# Patient Record
Sex: MALE | Race: WHITE | Employment: OTHER | URBAN - NONMETROPOLITAN AREA
[De-identification: names, ages, dates, MRNs, and addresses within clinical notes are randomized per-mention and may not be internally consistent; named-entity substitution may affect disease eponyms.]

---

## 2017-02-15 ENCOUNTER — HOSPITAL ENCOUNTER (OUTPATIENT)
Dept: NEUROLOGY | Age: 72
Discharge: HOME | End: 2017-02-15
Payer: MEDICARE

## 2017-02-15 PROCEDURE — 95910 NRV CNDJ TEST 7-8 STUDIES: CPT

## 2017-02-15 PROCEDURE — 95910 NRV CNDJ TEST 7-8 STUDIES: CPT | Performed by: PSYCHIATRY & NEUROLOGY

## 2017-02-15 PROCEDURE — 95886 MUSC TEST DONE W/N TEST COMP: CPT | Performed by: PSYCHIATRY & NEUROLOGY

## 2017-02-15 PROCEDURE — 95886 MUSC TEST DONE W/N TEST COMP: CPT

## 2017-02-27 ENCOUNTER — ANESTHESIA EVENT (OUTPATIENT)
Dept: OPERATING ROOM | Age: 72
End: 2017-02-27

## 2017-03-14 RX ORDER — LOSARTAN POTASSIUM 50 MG/1
12.5 TABLET ORAL DAILY
COMMUNITY
End: 2021-03-22 | Stop reason: DRUGHIGH

## 2017-03-14 RX ORDER — ATORVASTATIN CALCIUM 20 MG/1
20 TABLET, FILM COATED ORAL DAILY
COMMUNITY
End: 2022-10-13

## 2017-03-14 RX ORDER — GLYBURIDE 5 MG/1
2.5 TABLET ORAL 2 TIMES DAILY WITH MEALS
COMMUNITY
End: 2022-10-13

## 2017-03-15 ENCOUNTER — HOSPITAL ENCOUNTER (OUTPATIENT)
Dept: PREADMISSION TESTING | Age: 72
Setting detail: OUTPATIENT SURGERY
Discharge: HOME | End: 2017-03-15

## 2017-03-15 ENCOUNTER — HOSPITAL ENCOUNTER (OUTPATIENT)
Dept: LAB | Age: 72
Discharge: HOME | End: 2017-03-15
Payer: MEDICARE

## 2017-03-15 ENCOUNTER — HOSPITAL ENCOUNTER (OUTPATIENT)
Dept: NON INVASIVE DIAGNOSTICS | Age: 72
Discharge: HOME | End: 2017-03-15
Payer: MEDICARE

## 2017-03-15 VITALS — HEIGHT: 66 IN

## 2017-03-15 DIAGNOSIS — Z01.818 PRE-OP TESTING: ICD-10-CM

## 2017-03-15 DIAGNOSIS — Z01.818 PRE-OP TESTING: Primary | ICD-10-CM

## 2017-03-15 LAB
ANION GAP SERPL CALCULATED.3IONS-SCNC: 12 MMOL/L (ref 7–19)
BUN BLDV-MCNC: 16 MG/DL (ref 8–23)
CALCIUM SERPL-MCNC: 9.5 MG/DL (ref 8.8–10.2)
CHLORIDE BLD-SCNC: 99 MMOL/L (ref 98–111)
CO2: 27 MMOL/L (ref 22–29)
CREAT SERPL-MCNC: 0.8 MG/DL (ref 0.5–1.2)
GFR NON-AFRICAN AMERICAN: >60
GLUCOSE BLD-MCNC: 142 MG/DL (ref 74–109)
POTASSIUM SERPL-SCNC: 4.2 MMOL/L (ref 3.5–5)
SODIUM BLD-SCNC: 138 MMOL/L (ref 136–145)

## 2017-03-15 PROCEDURE — 93005 ELECTROCARDIOGRAM TRACING: CPT

## 2017-03-15 RX ORDER — ASPIRIN/CALCIUM/MAG/ALUMINUM 325 MG
1 TABLET ORAL ONCE
COMMUNITY

## 2017-03-15 RX ORDER — SODIUM CHLORIDE, SODIUM LACTATE, POTASSIUM CHLORIDE, CALCIUM CHLORIDE 600; 310; 30; 20 MG/100ML; MG/100ML; MG/100ML; MG/100ML
INJECTION, SOLUTION INTRAVENOUS CONTINUOUS
Status: CANCELLED | OUTPATIENT
Start: 2017-03-15

## 2017-03-16 ENCOUNTER — HOSPITAL ENCOUNTER (OUTPATIENT)
Age: 72
Setting detail: OUTPATIENT SURGERY
Discharge: HOME | End: 2017-03-16
Attending: ORTHOPAEDIC SURGERY | Admitting: ORTHOPAEDIC SURGERY

## 2017-03-16 ENCOUNTER — ANESTHESIA (OUTPATIENT)
Dept: OPERATING ROOM | Age: 72
End: 2017-03-16
Payer: MEDICARE

## 2017-03-16 VITALS
SYSTOLIC BLOOD PRESSURE: 151 MMHG | RESPIRATION RATE: 18 BRPM | OXYGEN SATURATION: 97 % | TEMPERATURE: 98.6 F | HEART RATE: 86 BPM | DIASTOLIC BLOOD PRESSURE: 97 MMHG

## 2017-03-16 VITALS
DIASTOLIC BLOOD PRESSURE: 80 MMHG | SYSTOLIC BLOOD PRESSURE: 125 MMHG | RESPIRATION RATE: 15 BRPM | OXYGEN SATURATION: 100 %

## 2017-03-16 PROCEDURE — G8907 PT DOC NO EVENTS ON DISCHARG: HCPCS

## 2017-03-16 PROCEDURE — 23430 REPAIR BICEPS TENDON: CPT

## 2017-03-16 PROCEDURE — 29824 SHO ARTHRS SRG DSTL CLAVICLC: CPT

## 2017-03-16 PROCEDURE — 2720000002 HC MISC SURG SUPPLY STERILE >$500: Performed by: ORTHOPAEDIC SURGERY

## 2017-03-16 PROCEDURE — C1713 ANCHOR/SCREW BN/BN,TIS/BN: HCPCS | Performed by: ORTHOPAEDIC SURGERY

## 2017-03-16 PROCEDURE — 23410 REPAIR ROTATOR CUFF ACUTE: CPT

## 2017-03-16 PROCEDURE — 29820 SHO ARTHRS SRG PRTL SYNVCT: CPT

## 2017-03-16 PROCEDURE — 01630 ANES OPN/ARTHR PX SHO JT NOS: CPT | Performed by: NURSE ANESTHETIST, CERTIFIED REGISTERED

## 2017-03-16 PROCEDURE — G8916 PT W IV AB GIVEN ON TIME: HCPCS

## 2017-03-16 PROCEDURE — 29822 SHO ARTHRS SRG LMTD DBRDMT: CPT

## 2017-03-16 DEVICE — ANCHOR SUT DIA2.9MM SUT SZ 2 MAXBRAID BLU POLY DBL LD SGL: Type: IMPLANTABLE DEVICE | Status: FUNCTIONAL

## 2017-03-16 DEVICE — IMPL TOGGLE LOC W/ ZIP LP KT: Type: IMPLANTABLE DEVICE | Status: FUNCTIONAL

## 2017-03-16 DEVICE — ANCHOR SUT DIA5.5MM W/ NO2 FORC FBR KNOTLESS EYELET FOR ROT: Type: IMPLANTABLE DEVICE | Status: FUNCTIONAL

## 2017-03-16 RX ORDER — PROPOFOL 10 MG/ML
INJECTION, EMULSION INTRAVENOUS PRN
Status: DISCONTINUED | OUTPATIENT
Start: 2017-03-16 | End: 2017-03-16 | Stop reason: SDUPTHER

## 2017-03-16 RX ORDER — LABETALOL HYDROCHLORIDE 5 MG/ML
5 INJECTION, SOLUTION INTRAVENOUS EVERY 10 MIN PRN
Status: DISCONTINUED | OUTPATIENT
Start: 2017-03-16 | End: 2017-03-16 | Stop reason: HOSPADM

## 2017-03-16 RX ORDER — ONDANSETRON 2 MG/ML
4 INJECTION INTRAMUSCULAR; INTRAVENOUS
Status: DISCONTINUED | OUTPATIENT
Start: 2017-03-16 | End: 2017-03-16 | Stop reason: HOSPADM

## 2017-03-16 RX ORDER — DIPHENHYDRAMINE HYDROCHLORIDE 50 MG/ML
12.5 INJECTION INTRAMUSCULAR; INTRAVENOUS
Status: DISCONTINUED | OUTPATIENT
Start: 2017-03-16 | End: 2017-03-16 | Stop reason: HOSPADM

## 2017-03-16 RX ORDER — MIDAZOLAM HYDROCHLORIDE 1 MG/ML
INJECTION INTRAMUSCULAR; INTRAVENOUS PRN
Status: DISCONTINUED | OUTPATIENT
Start: 2017-03-16 | End: 2017-03-16 | Stop reason: SDUPTHER

## 2017-03-16 RX ORDER — MEPERIDINE HYDROCHLORIDE 25 MG/ML
12.5 INJECTION INTRAMUSCULAR; INTRAVENOUS; SUBCUTANEOUS EVERY 5 MIN PRN
Status: DISCONTINUED | OUTPATIENT
Start: 2017-03-16 | End: 2017-03-16 | Stop reason: HOSPADM

## 2017-03-16 RX ORDER — OXYCODONE HYDROCHLORIDE AND ACETAMINOPHEN 5; 325 MG/1; MG/1
2 TABLET ORAL PRN
Status: DISCONTINUED | OUTPATIENT
Start: 2017-03-16 | End: 2017-03-16 | Stop reason: HOSPADM

## 2017-03-16 RX ORDER — OXYCODONE HYDROCHLORIDE AND ACETAMINOPHEN 5; 325 MG/1; MG/1
1 TABLET ORAL PRN
Status: DISCONTINUED | OUTPATIENT
Start: 2017-03-16 | End: 2017-03-16 | Stop reason: HOSPADM

## 2017-03-16 RX ORDER — HYDROMORPHONE HCL 110MG/55ML
0.25 PATIENT CONTROLLED ANALGESIA SYRINGE INTRAVENOUS EVERY 5 MIN PRN
Status: DISCONTINUED | OUTPATIENT
Start: 2017-03-16 | End: 2017-03-16 | Stop reason: HOSPADM

## 2017-03-16 RX ORDER — EPHEDRINE SULFATE 50 MG/ML
INJECTION, SOLUTION INTRAVENOUS PRN
Status: DISCONTINUED | OUTPATIENT
Start: 2017-03-16 | End: 2017-03-16 | Stop reason: SDUPTHER

## 2017-03-16 RX ORDER — HYDRALAZINE HYDROCHLORIDE 20 MG/ML
5 INJECTION INTRAMUSCULAR; INTRAVENOUS EVERY 10 MIN PRN
Status: DISCONTINUED | OUTPATIENT
Start: 2017-03-16 | End: 2017-03-16 | Stop reason: HOSPADM

## 2017-03-16 RX ORDER — HYDROCODONE BITARTRATE AND ACETAMINOPHEN 5; 325 MG/1; MG/1
2 TABLET ORAL PRN
Status: DISCONTINUED | OUTPATIENT
Start: 2017-03-16 | End: 2017-03-16 | Stop reason: HOSPADM

## 2017-03-16 RX ORDER — HYDROCODONE BITARTRATE AND ACETAMINOPHEN 10; 325 MG/1; MG/1
1 TABLET ORAL EVERY 4 HOURS PRN
Qty: 90 TABLET | Refills: 0 | Status: SHIPPED | OUTPATIENT
Start: 2017-03-16 | End: 2017-03-23

## 2017-03-16 RX ORDER — CEFAZOLIN SODIUM 1 G/3ML
INJECTION, POWDER, FOR SOLUTION INTRAMUSCULAR; INTRAVENOUS PRN
Status: DISCONTINUED | OUTPATIENT
Start: 2017-03-16 | End: 2017-03-16 | Stop reason: SDUPTHER

## 2017-03-16 RX ORDER — HYDROCODONE BITARTRATE AND ACETAMINOPHEN 5; 325 MG/1; MG/1
1 TABLET ORAL PRN
Status: DISCONTINUED | OUTPATIENT
Start: 2017-03-16 | End: 2017-03-16 | Stop reason: HOSPADM

## 2017-03-16 RX ORDER — SODIUM CHLORIDE, SODIUM LACTATE, POTASSIUM CHLORIDE, CALCIUM CHLORIDE 600; 310; 30; 20 MG/100ML; MG/100ML; MG/100ML; MG/100ML
INJECTION, SOLUTION INTRAVENOUS CONTINUOUS
Status: DISCONTINUED | OUTPATIENT
Start: 2017-03-16 | End: 2017-03-16

## 2017-03-16 RX ORDER — MORPHINE SULFATE 15 MG/1
15 TABLET, FILM COATED, EXTENDED RELEASE ORAL 2 TIMES DAILY
Qty: 60 TABLET | Refills: 0 | Status: SHIPPED | OUTPATIENT
Start: 2017-03-16 | End: 2017-04-15

## 2017-03-16 RX ORDER — ONDANSETRON 2 MG/ML
INJECTION INTRAMUSCULAR; INTRAVENOUS PRN
Status: DISCONTINUED | OUTPATIENT
Start: 2017-03-16 | End: 2017-03-16 | Stop reason: SDUPTHER

## 2017-03-16 RX ORDER — FENTANYL CITRATE 50 UG/ML
INJECTION, SOLUTION INTRAMUSCULAR; INTRAVENOUS PRN
Status: DISCONTINUED | OUTPATIENT
Start: 2017-03-16 | End: 2017-03-16 | Stop reason: SDUPTHER

## 2017-03-16 RX ORDER — SODIUM CHLORIDE, SODIUM LACTATE, POTASSIUM CHLORIDE, CALCIUM CHLORIDE 600; 310; 30; 20 MG/100ML; MG/100ML; MG/100ML; MG/100ML
INJECTION, SOLUTION INTRAVENOUS CONTINUOUS
Status: DISCONTINUED | OUTPATIENT
Start: 2017-03-16 | End: 2017-03-16 | Stop reason: HOSPADM

## 2017-03-16 RX ORDER — LIDOCAINE HYDROCHLORIDE 10 MG/ML
1 INJECTION, SOLUTION EPIDURAL; INFILTRATION; INTRACAUDAL; PERINEURAL
Status: DISCONTINUED | OUTPATIENT
Start: 2017-03-16 | End: 2017-03-16 | Stop reason: HOSPADM

## 2017-03-16 RX ORDER — HYDROMORPHONE HCL 110MG/55ML
0.5 PATIENT CONTROLLED ANALGESIA SYRINGE INTRAVENOUS EVERY 5 MIN PRN
Status: DISCONTINUED | OUTPATIENT
Start: 2017-03-16 | End: 2017-03-16 | Stop reason: HOSPADM

## 2017-03-16 RX ORDER — BUPIVACAINE HYDROCHLORIDE AND EPINEPHRINE 5; 5 MG/ML; UG/ML
INJECTION, SOLUTION PERINEURAL PRN
Status: DISCONTINUED | OUTPATIENT
Start: 2017-03-16 | End: 2017-03-16 | Stop reason: SDUPTHER

## 2017-03-16 RX ORDER — FENTANYL CITRATE 50 UG/ML
50 INJECTION, SOLUTION INTRAMUSCULAR; INTRAVENOUS EVERY 5 MIN PRN
Status: DISCONTINUED | OUTPATIENT
Start: 2017-03-16 | End: 2017-03-16 | Stop reason: HOSPADM

## 2017-03-16 RX ORDER — PROMETHAZINE HYDROCHLORIDE 25 MG/ML
12.5 INJECTION, SOLUTION INTRAMUSCULAR; INTRAVENOUS
Status: DISCONTINUED | OUTPATIENT
Start: 2017-03-16 | End: 2017-03-16 | Stop reason: HOSPADM

## 2017-03-16 RX ORDER — LIDOCAINE HYDROCHLORIDE 20 MG/ML
INJECTION, SOLUTION EPIDURAL; INFILTRATION; INTRACAUDAL; PERINEURAL PRN
Status: DISCONTINUED | OUTPATIENT
Start: 2017-03-16 | End: 2017-03-16 | Stop reason: SDUPTHER

## 2017-03-16 RX ADMIN — PROPOFOL 150 MG: 10 INJECTION, EMULSION INTRAVENOUS at 08:08

## 2017-03-16 RX ADMIN — MIDAZOLAM HYDROCHLORIDE 2 MG: 1 INJECTION INTRAMUSCULAR; INTRAVENOUS at 07:14

## 2017-03-16 RX ADMIN — LIDOCAINE HYDROCHLORIDE 50 MG: 20 INJECTION, SOLUTION EPIDURAL; INFILTRATION; INTRACAUDAL; PERINEURAL at 08:07

## 2017-03-16 RX ADMIN — EPHEDRINE SULFATE 10 MG: 50 INJECTION, SOLUTION INTRAVENOUS at 08:32

## 2017-03-16 RX ADMIN — SODIUM CHLORIDE, SODIUM LACTATE, POTASSIUM CHLORIDE, CALCIUM CHLORIDE: 600; 310; 30; 20 INJECTION, SOLUTION INTRAVENOUS at 07:59

## 2017-03-16 RX ADMIN — BUPIVACAINE HYDROCHLORIDE AND EPINEPHRINE 30 ML: 5; 5 INJECTION, SOLUTION PERINEURAL at 07:28

## 2017-03-16 RX ADMIN — CEFAZOLIN SODIUM 1000 MG: 1 INJECTION, POWDER, FOR SOLUTION INTRAMUSCULAR; INTRAVENOUS at 07:59

## 2017-03-16 RX ADMIN — SODIUM CHLORIDE, SODIUM LACTATE, POTASSIUM CHLORIDE, CALCIUM CHLORIDE: 600; 310; 30; 20 INJECTION, SOLUTION INTRAVENOUS at 06:50

## 2017-03-16 RX ADMIN — ONDANSETRON 4 MG: 2 INJECTION INTRAMUSCULAR; INTRAVENOUS at 09:16

## 2017-03-16 RX ADMIN — FENTANYL CITRATE 50 MCG: 50 INJECTION, SOLUTION INTRAMUSCULAR; INTRAVENOUS at 08:05

## 2017-04-19 ENCOUNTER — TRANSCRIBE ORDERS (OUTPATIENT)
Dept: PHYSICAL THERAPY | Facility: HOSPITAL | Age: 72
End: 2017-04-19

## 2017-04-19 ENCOUNTER — HOSPITAL ENCOUNTER (OUTPATIENT)
Dept: PHYSICAL THERAPY | Facility: HOSPITAL | Age: 72
Setting detail: THERAPIES SERIES
Discharge: HOME | End: 2017-04-19

## 2017-04-19 DIAGNOSIS — M25.512 ACUTE PAIN OF LEFT SHOULDER: ICD-10-CM

## 2017-04-19 DIAGNOSIS — M25.512 LEFT SHOULDER PAIN, UNSPECIFIED CHRONICITY: Primary | ICD-10-CM

## 2017-04-19 DIAGNOSIS — Z98.890 S/P ROTATOR CUFF REPAIR: Primary | ICD-10-CM

## 2017-04-19 PROCEDURE — G8984 CARRY CURRENT STATUS: HCPCS

## 2017-04-19 PROCEDURE — G8985 CARRY GOAL STATUS: HCPCS

## 2017-04-19 PROCEDURE — 97161 PT EVAL LOW COMPLEX 20 MIN: CPT

## 2017-04-19 NOTE — PROGRESS NOTES
Outpatient Physical Therapy Ortho Initial Evaluation  [facility]     Patient Name: Michael Novak  : 1945  MRN: [id number]  Today's Date: 2017      Visit Date: 2017    There is no problem list on file for this patient.       Past Medical History:   Diagnosis Date   • Back pain    • Coronary artery disease    • Diabetes mellitus    • Hearing deficit    • Hypertension         Past Surgical History:   Procedure Laterality Date   • ROTATOR CUFF REPAIR Left 03/15/2017       Visit Dx:     ICD-10-CM ICD-9-CM   1. S/P rotator cuff repair Z98.890 V45.89   2. Acute pain of left shoulder M25.512 719.41             Patient History       17 1400          History    Chief Complaint Pain;Muscle weakness  -RS      Type of Pain Shoulder pain  -RS      Date Current Problem(s) Began 17  -RS      Brief Description of Current Complaint Patient reports a 40 year history of shoulder pain on each side.  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  The pain was so bad that he elected to have a left rotator cuff repair (tendon unknown) one month ago.  He is not using a sling and has been on restrictions lifting >1#.  Pt has been performing pendulum exercises.  -RS      Previous treatment for THIS PROBLEM Injections;Medication  -RS      Onset Date- PT 2017  -RS      Patient/Caregiver Goals Relieve pain;Improve mobility;Improve strength  -RS      Current Tobacco Use no  -RS      Smoking Status never smoker  -RS      Patient's Rating of General Health Very good  -RS      Hand Dominance right-handed  -RS      Occupation/sports/leisure activities   -RS      Pain     Pain Location Shoulder   left  -RS      Pain at Present 1  -RS      Pain at Best 0  -RS      Pain at Worst 6  -RS      Pain Frequency Intermittent  -RS      Pain Description Aching;Sharp;Sore  -RS      Pain Comments Localized to the left shoulder.  No pain distal with no N/T into the  hand  -RS      Fall Risk Assessment    Any falls in the past year: No  -RS      Daily Activities    Primary Language English  -RS      Does patient have problems with the following? None  -RS      Pt Participated in POC and Goals Yes  -RS      Safety    Are you being hurt, hit, or frightened by anyone at home or in your life? No  -RS      Are you being neglected by a caregiver No  -RS        User Key  (r) = Recorded By, (t) = Taken By, (c) = Cosigned By    Initials Name Provider Type    RS Mac Collins PT DPT Physical Therapist                PT Ortho       04/19/17 1500    Posture/Observations    Alignment Options Thoracic kyphosis;Rounded shoulders  -RS    Thoracic Kyphosis Moderate;Increased;Sitting posture  -RS    Rounded Shoulders Mild;Increased;Sitting posture  -RS    Posture/Observations Comments Scapular alignment is WNL in terms of abduction and relationship to midline.  Increased scapular IR and anterior tilting.  -RS    Sensation    Sensation WNL? WNL  -RS    Light Touch No apparent deficits  -RS    Special Tests/Palpation    Special Tests/Palpation Shoulder  -RS    Shoulder Girdle Palpation    Deltoid Left:;Atrophied  -RS    Infraspinatus Left:;Atrophied  -RS    Teres Minor Left:;Atrophied  -RS    Upper Trap Left:;Tender;Guarded/taut  -RS    Middle Trap Left:;Atrophied  -RS    Lower Trap Left:;Atrophied  -RS    Shoulder Girdle Palpation? Yes  -RS    Shoulder Girdle Accessory Motions    Shoulder Girdle Accessory Motions Tested? Yes  -RS    Posterior glide of humerus Left:;Hypomobile  -RS    Inferior glide of humerus Left:;Hypomobile  -RS    Superior mobility of scapula Left:;Hypomobile  -RS    ROM (Range of Motion)    General ROM upper extremity range of motion deficits identified  -RS    Left Shoulder    Flexion PROM other (see comments)   115  -RS    ABduction PROM other (see comments)   105 plane of scapula  -RS    External Rotation PROM other (see comments)   31 with arm by the side  -RS     Internal Rotation PROM other (see comments)   45  -RS    ROM Impairment Detail Cross body adduction limited to the left eye with the scapula stabilized  -RS    General UE Assessment    ROM shoulder, left: UE ROM deficit  -RS    MMT (Manual Muscle Testing)    General MMT Assessment Detail unable to grade due to post operative restrictions  -RS    Pathomechanics    Upper Extremity Pathomechanics Limited scapular upward rotation;Limited thoracic extension;Limited glenohumeral internal rotation  -RS      User Key  (r) = Recorded By, (t) = Taken By, (c) = Cosigned By    Initials Name Provider Type    RS Mac Collins, PT DPT Physical Therapist                            Therapy Education       04/19/17 1500          Therapy Education    Given Symptoms/condition management  -RS      Program Modified   cradle the baby pendulums  -RS      How Provided Verbal  -RS      Provided to Patient  -RS      Level of Understanding Verbalized  -RS        User Key  (r) = Recorded By, (t) = Taken By, (c) = Cosigned By    Initials Name Provider Type    RS Mac Collins, PT DPT Physical Therapist                PT OP Goals       04/19/17 1500       PT Short Term Goals    STG Date to Achieve 05/10/17  -RS     STG 1 Patient will improve cross body adduction to the left eye with the scapula stabilized  -RS     STG 1 Progress New  -RS     STG 2 Patient will be knowledgeable of restrictions for ROM   -RS     STG 2 Progress New  -RS     STG 3 Patient will demonstrate full (L) shoulder PROM in all planes with no significant pain at end range.  -RS     STG 3 Progress New  -RS     Long Term Goals    LTG Date to Achieve 05/31/17  -RS     LTG 1 Patient will be independent with a comprehensive HEP  -RS     LTG 1 Progress New  -RS     LTG 2 Patient will demonstrate AROM within 10 degrees of the right shoulder in all planes, demonstrating no superior glide during first half of the range.  -RS     LTG 2 Progress New  -RS     LTG 3 Patient  will demonstrate left rotator cuff strength to at least 4+/5 on MMT by discharge  -RS     LTG 3 Progress New  -RS     LTG 4 Patient will score <10 on the Quick DASH indicating significant improvement in overall function of the left shoulder with ADL's by discharge.  -RS     LTG 4 Progress New  -RS     LTG 5 Patient will report symptoms <2/10 with ADL's and leisure activities by discharge.  -RS     LTG 5 Progress New  -RS     Time Calculation    PT Goal Re-Cert Due Date 05/19/17  -RS       User Key  (r) = Recorded By, (t) = Taken By, (c) = Cosigned By    Initials Name Provider Type    RS Mac Collins, PT DPT Physical Therapist                PT Assessment/Plan       04/19/17 1500       PT Assessment    Functional Limitations Performance in work activities;Performance in leisure activities;Performance in self-care ADL  -RS     Impairments Joint mobility;Muscle strength;Pain;Posture;Range of motion  -RS     Assessment Comments Patient presents today s/p (L) rotator cuff repair one month prior.  The exact nature of the surgery is not known as the patient voices that the repair involved more than one tendon.  This is a chronic cuff injury that will need to be loaded slowly over time.  The quality of PROM today was generally good with only mild joint limitations (also likely chronic).  Patient has been compliant with the post operative MD restrictions and was educated on additional precautions today (reaching behind the back, extension with IR, etc).  There is noted atrophy of the supraspinatus and infraspinatus/teres minor with assessment today.  After the PROM is restored we will begin to load the tendon in conjunction with normal tissue healing timelines and patient response.  The patient remains motivated to improve and understands that this will be a process.  Thank you for allowing us to work with this patient.   -RS     Please refer to paper survey for additional self-reported information Yes  -RS      Rehab Potential Good  -RS     Patient/caregiver participated in establishment of treatment plan and goals Yes  -RS     Patient would benefit from skilled therapy intervention Yes  -RS     PT Plan    PT Frequency 2x/week  -RS     Predicted Duration of Therapy Intervention (days/wks) 6-10 weeks  -RS     Planned CPT's? PT EVAL LOW COMPLEXITY: 65922;PT THER PROC EA 15 MIN: 26987;PT MANUAL THERAPY EA 15 MIN: 65254;PT NEUROMUSC RE-EDUCATION EA 15 MIN: 62928;PT HOT OR COLD PACK TREAT MCARE;PT ELECTRICAL STIM UNATTEND: ;PT ELECTRICAL STIM ATTD EA 15 MIN: 66633  -RS     Physical Therapy Interventions (Optional Details) home exercise program;joint mobilization;manual therapy techniques;modalities;neuromuscular re-education;patient/family education;ROM (Range of Motion);strengthening;stretching;taping;swiss ball techniques  -RS     PT Plan Comments We will begin by restoring the PROM in all planes, improve thoracic mobility, and address scapular movement impairments.  We will begin to gradually stress the rotator cuff as ROM improves but proceed cautiously given the age, timeline from injury to repair, and number of tendons repaired.  Patient will be progressed on a comprehensive HEP.  -RS       User Key  (r) = Recorded By, (t) = Taken By, (c) = Cosigned By    Initials Name Provider Type    RS Mac Collins, PT DPT Physical Therapist                                    Outcome Measures       04/19/17 1500          Quick DASH    Open a tight or new jar. 2  -RS      Do heavy household chores (e.g., wash walls, wash floors) 3  -RS      Carry a shopping bag or briefcase 5  -RS      Wash your back 3  -RS      Use a knife to cut food 3  -RS      Recreational activities in which you take some force or impact through your arm, should or hand (e.g. golf, hammering, tennis, etc.) 5  -RS      During the past week, to what extent has your arm, shoulder, or hand problem interfered with your normal social activites with family,  friends, neighbors or groups? 4  -RS      During the past week, were you limited in your work or other regular daily activities as a result of your arm, shoulder or hand problem? 5  -RS      Arm, Shoulder, or hand pain 3  -RS      Tingling (pins and needles) in your arm, shoulder, or hand 1  -RS      During the past week, how much difficulty have you had sleeping because of the pain in your arm, shoulder or hand? 3  -RS      Number of Questions Answered 11  -RS      Quick DASH Score 59.09  -RS      Quick Dash Comments 60-79%  -RS      Functional Assessment    Outcome Measure Options Quick DASH  -RS        User Key  (r) = Recorded By, (t) = Taken By, (c) = Cosigned By    Initials Name Provider Type    RS Mac Collins, PT DPT Physical Therapist            Time Calculation:   Start Time: 1402  Stop Time: 1500  Time Calculation (min): 58 min     Therapy Charges for Today     Code Description Service Date Service Provider Modifiers Qty    61266590421 HC PT CARRY MOV HAND OBJ CURRENT 4/19/2017 Mac Collins, PT DPT GP, CL 1    25162366495 HC PT CARRY MOV HAND OBJ PROJECTED 4/19/2017 Mac Collins, PT DPT GP, CI 1    96230614755 HC PT EVAL LOW COMPLEXITY 4 4/19/2017 Mac Collins, PT DPT GP 1          PT G-Codes  Outcome Measure Options: Quick DASH  Score: 37  Functional Limitation: Carrying, moving and handling objects  Carrying, Moving and Handling Objects Current Status (): At least 60 percent but less than 80 percent impaired, limited or restricted  Carrying, Moving and Handling Objects Goal Status (): At least 1 percent but less than 20 percent impaired, limited or restricted         ROSALVA Collins, PT DPT  4/19/2017

## 2017-04-25 ENCOUNTER — HOSPITAL ENCOUNTER (OUTPATIENT)
Dept: PHYSICAL THERAPY | Facility: HOSPITAL | Age: 72
Setting detail: THERAPIES SERIES
Discharge: HOME | End: 2017-04-25

## 2017-04-25 DIAGNOSIS — Z98.890 S/P ROTATOR CUFF REPAIR: Primary | ICD-10-CM

## 2017-04-25 DIAGNOSIS — M25.512 ACUTE PAIN OF LEFT SHOULDER: ICD-10-CM

## 2017-04-25 PROCEDURE — G0283 ELEC STIM OTHER THAN WOUND: HCPCS

## 2017-04-25 PROCEDURE — 97140 MANUAL THERAPY 1/> REGIONS: CPT

## 2017-04-25 NOTE — PROGRESS NOTES
Outpatient Physical Therapy Ortho Treatment Note  [facility]     Patient Name: Michael Novak  : 1945  MRN: [id number]  Today's Date: 2017      Visit Date: 2017    Visit Dx:    ICD-10-CM ICD-9-CM   1. S/P rotator cuff repair Z98.890 V45.89   2. Acute pain of left shoulder M25.512 719.41       There is no problem list on file for this patient.       Past Medical History:   Diagnosis Date   • Back pain    • Coronary artery disease    • Diabetes mellitus    • Hearing deficit    • Hypertension         Past Surgical History:   Procedure Laterality Date   • ROTATOR CUFF REPAIR Left 03/15/2017                             PT Assessment/Plan       17 0924       PT Assessment    Assessment Comments No goals met at this time; however, today was the first treatment session following the initial evaluation. ROM improving with passive stretching. Laser stem performed to decrease inflammation and pain, no adverse signs or symptoms post Laserstim .   -EC     PT Plan    PT Plan Comments Continue with PROM and modalities for inflamation and pain management.   -EC       User Key  (r) = Recorded By, (t) = Taken By, (c) = Cosigned By    Initials Name Provider Type    EC Carter Oliva PTA Physical Therapy Assistant                Modalities       17 0800          ELECTRICAL STIMULATION    Attended/Unattended Attended  -EC      Stimulation Type --   Laser stim Chronic Inflammation Mode  -EC      Location/Electrode Placement/Other L anterior  -EC      Rx Minutes 10 mins  -EC        User Key  (r) = Recorded By, (t) = Taken By, (c) = Cosigned By    Initials Name Provider Type    LATISHA Oliva PTA Physical Therapy Assistant                Exercises       17 0800          Subjective Comments    Subjective Comments Pt reports L shoulder pain, denies new complaints  -EC      Subjective Pain    Able to rate subjective pain? yes  -EC      Pre-Treatment Pain Level 1  -EC      Exercise 1     Exercise Name 1 L shoulder PROM abduction, and flexion   -EC      Time (Minutes) 1 8  -EC      Exercise 2    Exercise Name 2 L shoulder ER/IR stretches with LAD loose pack   -EC      Time (Minutes) 2 5  -EC        User Key  (r) = Recorded By, (t) = Taken By, (c) = Cosigned By    Initials Name Provider Type    EC Carter Oliva PTA Physical Therapy Assistant                        Manual Rx (last 36 hours)      Manual Treatments       04/25/17 0900          Manual Rx 1    Manual Rx 1 Location L bicep  -EC      Manual Rx 1 Type IASTM  with EDGE tool  -EC      Manual Rx 1 Duration 10  -EC        User Key  (r) = Recorded By, (t) = Taken By, (c) = Cosigned By    Initials Name Provider Type    EC Carter Oliva PTA Physical Therapy Assistant                PT OP Goals       04/25/17 0900 04/25/17 0848    PT Short Term Goals    STG Date to Achieve 05/10/17  -EC     STG 1 Patient will improve cross body adduction to the left eye with the scapula stabilized  -EC     STG 1 Progress Ongoing  -EC     STG 2 Patient will be knowledgeable of restrictions for ROM   -EC     STG 2 Progress Ongoing  -EC     STG 3 Patient will demonstrate full (L) shoulder PROM in all planes with no significant pain at end range.  -EC     STG 3 Progress Ongoing  -EC     Long Term Goals    LTG Date to Achieve 05/31/17  -EC     LTG 1 Patient will be independent with a comprehensive HEP  -EC     LTG 1 Progress Ongoing  -EC     LTG 2 Patient will demonstrate AROM within 10 degrees of the right shoulder in all planes, demonstrating no superior glide during first half of the range.  -EC     LTG 2 Progress Ongoing  -EC     LTG 3 Patient will demonstrate left rotator cuff strength to at least 4+/5 on MMT by discharge  -EC     LTG 3 Progress Ongoing  -EC     LTG 4 Patient will score <10 on the Quick DASH indicating significant improvement in overall function of the left shoulder with ADL's by discharge.  -EC     LTG 4 Progress Ongoing  -EC     LTG 5  Patient will report symptoms <2/10 with ADL's and leisure activities by discharge.  -EC     LTG 5 Progress Ongoing  -EC     Time Calculation    PT Goal Re-Cert Due Date  05/19/17  -EC      User Key  (r) = Recorded By, (t) = Taken By, (c) = Cosigned By    Initials Name Provider Type    EC Carter Oliva PTA Physical Therapy Assistant                Therapy Education       04/25/17 0922          Therapy Education    Given Symptoms/condition management;Pain management  -EC      How Provided Verbal  -EC      Provided to Patient  -EC      Level of Understanding Verbalized;Demonstrated  -EC        User Key  (r) = Recorded By, (t) = Taken By, (c) = Cosigned By    Initials Name Provider Type    EC Carter Oliva PTA Physical Therapy Assistant                Time Calculation:   Start Time: 0848  Stop Time: 0930  Time Calculation (min): 42 min  Total Timed Code Minutes- PT: 42 minute(s)    Therapy Charges for Today     Code Description Service Date Service Provider Modifiers Qty    42697533169 HC PT ELECTRICAL STIM UNATTENDED 4/25/2017 Carter Oliva PTA  1    88225381324 HC PT MANUAL THERAPY EA 15 MIN 4/25/2017 Carter Oliva PTA GP 2                    Carter Oliva PTA  4/25/2017

## 2017-04-27 ENCOUNTER — HOSPITAL ENCOUNTER (OUTPATIENT)
Dept: PHYSICAL THERAPY | Facility: HOSPITAL | Age: 72
Setting detail: THERAPIES SERIES
Discharge: HOME | End: 2017-04-27

## 2017-04-27 DIAGNOSIS — M25.512 ACUTE PAIN OF LEFT SHOULDER: ICD-10-CM

## 2017-04-27 DIAGNOSIS — Z98.890 S/P ROTATOR CUFF REPAIR: Primary | ICD-10-CM

## 2017-04-27 PROCEDURE — 97140 MANUAL THERAPY 1/> REGIONS: CPT

## 2017-04-27 NOTE — PROGRESS NOTES
Outpatient Physical Therapy Ortho Treatment Note  [facility]     Patient Name: Michael Novak  : 1945  MRN: [id number]  Today's Date: 2017      Visit Date: 2017    Visit Dx:    ICD-10-CM ICD-9-CM   1. S/P rotator cuff repair Z98.890 V45.89   2. Acute pain of left shoulder M25.512 719.41       There is no problem list on file for this patient.       Past Medical History:   Diagnosis Date   • Back pain    • Coronary artery disease    • Diabetes mellitus    • Hearing deficit    • Hypertension         Past Surgical History:   Procedure Laterality Date   • ROTATOR CUFF REPAIR Left 03/15/2017                             PT Assessment/Plan       17 8857       PT Assessment    Assessment Comments Pt reports he is not having much pain at this point. His pain was slightly increased post session from 0.25 to 0.5/10 post session according to him. After addressing his guarding and manual PROM techniques his range was improved and he felt is shoulder was less stiff. LaserStim was performed to address his inflammation.   -TC     PT Plan    PT Plan Comments Continue to address his guarding, inflammation and PROM following healing times and protocol.   -TC       User Key  (r) = Recorded By, (t) = Taken By, (c) = Cosigned By    Initials Name Provider Type    TC Dianna Renteria, PT Physical Therapist                Modalities       17 1353          ELECTRICAL STIMULATION    Attended/Unattended Attended  -TC      Stimulation Type --   Laser stim Chronic Inflammation Mode  -TC      Location/Electrode Placement/Other L anterior shoulder complex   -TC      Rx Minutes 10 mins  -TC        User Key  (r) = Recorded By, (t) = Taken By, (c) = Cosigned By    Initials Name Provider Type    JANET Renteria, PT Physical Therapist                Exercises       17 1353          Subjective Comments    Subjective Comments Pt reports his pain is .25   -TC      Subjective Pain    Able to rate  subjective pain? yes  -TC      Pre-Treatment Pain Level --   0.25  -TC      Subjective Pain Comment L shoulder   -TC        User Key  (r) = Recorded By, (t) = Taken By, (c) = Cosigned By    Initials Name Provider Type    TC Dianna Renteria, PT Physical Therapist                        Manual Rx (last 36 hours)      Manual Treatments       04/27/17 1353          Manual Rx 1    Manual Rx 1 Location L bicep and shoulder region  -TC      Manual Rx 1 Type IASTM  with EDGE tool  -TC      Manual Rx 1 Grade mild pressure   -TC      Manual Rx 1 Duration 10  -TC      Manual Rx 2    Manual Rx 2 Location L GHJ   -TC      Manual Rx 2 Type PROM into flexion, abduction in scapular plane   limited by pain at this time; flexion best 135; abd 115 scap  -TC      Manual Rx 2 Grade 15 reps each with intermittent holds at available end range (no OP)    -TC      Manual Rx 2 Duration 10  -TC      Manual Rx 3    Manual Rx 3 Location L GHJ   -TC      Manual Rx 3 Type PROM IR/ER in open packed position    limited by pain at this time; gross full range 25%   -TC      Manual Rx 3 Grade 15 reps ea with intermittent holds at available end range (no OP)   -TC      Manual Rx 3 Duration 15  -TC        User Key  (r) = Recorded By, (t) = Taken By, (c) = Cosigned By    Initials Name Provider Type     Dianna Renteria, PT Physical Therapist                PT OP Goals       04/27/17 1353       PT Short Term Goals    STG Date to Achieve 05/10/17  -TC     STG 1 Patient will improve cross body adduction to the left eye with the scapula stabilized  -TC     STG 1 Progress Ongoing  -TC     STG 2 Patient will be knowledgeable of restrictions for ROM   -TC     STG 2 Progress Ongoing  -TC     STG 3 Patient will demonstrate full (L) shoulder PROM in all planes with no significant pain at end range.  -TC     STG 3 Progress Ongoing  -TC     STG 3 Progress Comments working towards this with manual and passive techniques today; 25% improved flexion and abd into  scap plane; see manual section for ROM measurements s  -TC     Long Term Goals    LTG Date to Achieve 05/31/17  -TC     LTG 1 Patient will be independent with a comprehensive HEP  -TC     LTG 1 Progress Ongoing  -TC     LTG 1 Progress Comments pt able to report his precautions and HEP to me today w/ min cues   -TC     LTG 2 Patient will demonstrate AROM within 10 degrees of the right shoulder in all planes, demonstrating no superior glide during first half of the range.  -TC     LTG 2 Progress Ongoing  -TC     LTG 3 Patient will demonstrate left rotator cuff strength to at least 4+/5 on MMT by discharge  -TC     LTG 3 Progress Ongoing  -TC     LTG 4 Patient will score <10 on the Quick DASH indicating significant improvement in overall function of the left shoulder with ADL's by discharge.  -TC     LTG 4 Progress Ongoing  -TC     LTG 5 Patient will report symptoms <2/10 with ADL's and leisure activities by discharge.  -TC     LTG 5 Progress Ongoing  -TC     LTG 5 Progress Comments .25/10 today per pt report   -TC     Time Calculation    PT Goal Re-Cert Due Date 05/19/17  -TC       User Key  (r) = Recorded By, (t) = Taken By, (c) = Cosigned By    Initials Name Provider Type    TC Dianna Renteria PT Physical Therapist                Therapy Education       04/27/17 1436          Therapy Education    Given HEP;Symptoms/condition management;Posture/body mechanics;Edema management  -TC      Program Reinforced  -TC      How Provided Verbal  -TC      Provided to Patient  -TC      Level of Understanding Teach back education performed;Verbalized;Demonstrated  -TC        User Key  (r) = Recorded By, (t) = Taken By, (c) = Cosigned By    Initials Name Provider Type    JANET Renteria PT Physical Therapist                Time Calculation:   Start Time: 1353  Stop Time: 1443  Time Calculation (min): 50 min  Total Timed Code Minutes- PT: 50 minute(s)    Therapy Charges for Today     Code Description Service Date Service  Provider Modifiers Qty    57369926365 HC PT MANUAL THERAPY EA 15 MIN 4/27/2017 Dianna Renteria, PT GP 3                    Dianna Renteria, PT  4/27/2017

## 2017-05-03 ENCOUNTER — HOSPITAL ENCOUNTER (OUTPATIENT)
Dept: PHYSICAL THERAPY | Facility: HOSPITAL | Age: 72
Setting detail: THERAPIES SERIES
Discharge: HOME | End: 2017-05-03

## 2017-05-03 DIAGNOSIS — M25.512 ACUTE PAIN OF LEFT SHOULDER: ICD-10-CM

## 2017-05-03 DIAGNOSIS — Z98.890 S/P ROTATOR CUFF REPAIR: Primary | ICD-10-CM

## 2017-05-03 PROCEDURE — 97140 MANUAL THERAPY 1/> REGIONS: CPT

## 2017-05-03 PROCEDURE — 97110 THERAPEUTIC EXERCISES: CPT

## 2017-05-05 ENCOUNTER — HOSPITAL ENCOUNTER (OUTPATIENT)
Dept: PHYSICAL THERAPY | Facility: HOSPITAL | Age: 72
Setting detail: THERAPIES SERIES
Discharge: HOME | End: 2017-05-05

## 2017-05-05 DIAGNOSIS — Z98.890 S/P ROTATOR CUFF REPAIR: Primary | ICD-10-CM

## 2017-05-05 DIAGNOSIS — M25.512 ACUTE PAIN OF LEFT SHOULDER: ICD-10-CM

## 2017-05-05 PROCEDURE — 97110 THERAPEUTIC EXERCISES: CPT

## 2017-05-09 ENCOUNTER — HOSPITAL ENCOUNTER (OUTPATIENT)
Dept: PHYSICAL THERAPY | Facility: HOSPITAL | Age: 72
Setting detail: THERAPIES SERIES
Discharge: HOME | End: 2017-05-09

## 2017-05-09 DIAGNOSIS — Z98.890 S/P ROTATOR CUFF REPAIR: Primary | ICD-10-CM

## 2017-05-09 DIAGNOSIS — M25.512 ACUTE PAIN OF LEFT SHOULDER: ICD-10-CM

## 2017-05-09 PROCEDURE — 97140 MANUAL THERAPY 1/> REGIONS: CPT

## 2017-05-09 PROCEDURE — 97110 THERAPEUTIC EXERCISES: CPT

## 2017-05-11 ENCOUNTER — HOSPITAL ENCOUNTER (OUTPATIENT)
Dept: PHYSICAL THERAPY | Facility: HOSPITAL | Age: 72
Setting detail: THERAPIES SERIES
Discharge: HOME | End: 2017-05-11

## 2017-05-11 DIAGNOSIS — Z98.890 S/P ROTATOR CUFF REPAIR: Primary | ICD-10-CM

## 2017-05-11 DIAGNOSIS — M25.512 ACUTE PAIN OF LEFT SHOULDER: ICD-10-CM

## 2017-05-11 PROCEDURE — 97110 THERAPEUTIC EXERCISES: CPT

## 2017-05-11 PROCEDURE — 97140 MANUAL THERAPY 1/> REGIONS: CPT

## 2017-05-16 ENCOUNTER — HOSPITAL ENCOUNTER (OUTPATIENT)
Dept: PHYSICAL THERAPY | Facility: HOSPITAL | Age: 72
Setting detail: THERAPIES SERIES
Discharge: HOME | End: 2017-05-16

## 2017-05-16 ENCOUNTER — APPOINTMENT (OUTPATIENT)
Dept: PHYSICAL THERAPY | Facility: HOSPITAL | Age: 72
End: 2017-05-16

## 2017-05-16 DIAGNOSIS — M25.512 ACUTE PAIN OF LEFT SHOULDER: ICD-10-CM

## 2017-05-16 DIAGNOSIS — Z98.890 S/P ROTATOR CUFF REPAIR: Primary | ICD-10-CM

## 2017-05-16 PROCEDURE — 97110 THERAPEUTIC EXERCISES: CPT

## 2017-05-18 ENCOUNTER — HOSPITAL ENCOUNTER (OUTPATIENT)
Dept: PHYSICAL THERAPY | Facility: HOSPITAL | Age: 72
Setting detail: THERAPIES SERIES
Discharge: HOME | End: 2017-05-18

## 2017-05-18 ENCOUNTER — APPOINTMENT (OUTPATIENT)
Dept: PHYSICAL THERAPY | Facility: HOSPITAL | Age: 72
End: 2017-05-18

## 2017-05-18 DIAGNOSIS — M25.512 ACUTE PAIN OF LEFT SHOULDER: ICD-10-CM

## 2017-05-18 DIAGNOSIS — Z98.890 S/P ROTATOR CUFF REPAIR: Primary | ICD-10-CM

## 2017-05-18 PROCEDURE — 97140 MANUAL THERAPY 1/> REGIONS: CPT

## 2017-05-18 PROCEDURE — 97110 THERAPEUTIC EXERCISES: CPT

## 2017-05-23 ENCOUNTER — HOSPITAL ENCOUNTER (OUTPATIENT)
Dept: PHYSICAL THERAPY | Facility: HOSPITAL | Age: 72
Setting detail: THERAPIES SERIES
Discharge: HOME | End: 2017-05-23

## 2017-05-23 DIAGNOSIS — Z98.890 S/P ROTATOR CUFF REPAIR: Primary | ICD-10-CM

## 2017-05-23 DIAGNOSIS — M25.512 ACUTE PAIN OF LEFT SHOULDER: ICD-10-CM

## 2017-05-23 PROCEDURE — G8985 CARRY GOAL STATUS: HCPCS

## 2017-05-23 PROCEDURE — G8984 CARRY CURRENT STATUS: HCPCS

## 2017-05-23 PROCEDURE — 97110 THERAPEUTIC EXERCISES: CPT

## 2017-05-25 ENCOUNTER — HOSPITAL ENCOUNTER (OUTPATIENT)
Dept: PHYSICAL THERAPY | Facility: HOSPITAL | Age: 72
Setting detail: THERAPIES SERIES
Discharge: HOME | End: 2017-05-25

## 2017-05-25 DIAGNOSIS — M25.512 ACUTE PAIN OF LEFT SHOULDER: ICD-10-CM

## 2017-05-25 DIAGNOSIS — Z98.890 S/P ROTATOR CUFF REPAIR: Primary | ICD-10-CM

## 2017-05-25 PROCEDURE — 97110 THERAPEUTIC EXERCISES: CPT

## 2017-05-30 ENCOUNTER — HOSPITAL ENCOUNTER (OUTPATIENT)
Dept: PHYSICAL THERAPY | Facility: HOSPITAL | Age: 72
Setting detail: THERAPIES SERIES
Discharge: HOME | End: 2017-05-30

## 2017-05-30 DIAGNOSIS — Z98.890 S/P ROTATOR CUFF REPAIR: Primary | ICD-10-CM

## 2017-05-30 DIAGNOSIS — M25.512 ACUTE PAIN OF LEFT SHOULDER: ICD-10-CM

## 2017-05-30 PROCEDURE — 97140 MANUAL THERAPY 1/> REGIONS: CPT

## 2017-05-30 PROCEDURE — 97110 THERAPEUTIC EXERCISES: CPT

## 2017-06-01 ENCOUNTER — HOSPITAL ENCOUNTER (OUTPATIENT)
Dept: PHYSICAL THERAPY | Facility: HOSPITAL | Age: 72
Setting detail: THERAPIES SERIES
Discharge: HOME | End: 2017-06-01

## 2017-06-01 DIAGNOSIS — Z98.890 S/P ROTATOR CUFF REPAIR: Primary | ICD-10-CM

## 2017-06-01 DIAGNOSIS — M25.512 ACUTE PAIN OF LEFT SHOULDER: ICD-10-CM

## 2017-06-01 PROCEDURE — 97110 THERAPEUTIC EXERCISES: CPT

## 2017-06-01 PROCEDURE — 97140 MANUAL THERAPY 1/> REGIONS: CPT

## 2017-06-01 NOTE — THERAPY TREATMENT NOTE
Outpatient Physical Therapy Ortho Treatment Note  [facility]     Patient Name: Michael Novak  : 1945  MRN: [id number]  Today's Date: 2017      Visit Date: 2017    Visit Dx:    ICD-10-CM ICD-9-CM   1. S/P rotator cuff repair Z98.890 V45.89   2. Acute pain of left shoulder M25.512 719.41       There is no problem list on file for this patient.       Past Medical History:   Diagnosis Date   • Back pain    • Coronary artery disease    • Diabetes mellitus    • Hearing deficit    • Hypertension         Past Surgical History:   Procedure Laterality Date   • ROTATOR CUFF REPAIR Left 03/15/2017                             PT Assessment/Plan       17 0800       PT Assessment    Assessment Comments The open nature of the repair is guiding the rate at which we stress the tendon.  Scapular ROM has vastly improved overall  Posterior cuff soft tissue is a limiting factor at this time and this was addressed with the IAS  -RS     PT Plan    PT Plan Comments Continue with progressive cautious strengthening given open nature of repair; gradually load the tendons with endurance focus as well.  -RS       User Key  (r) = Recorded By, (t) = Taken By, (c) = Cosigned By    Initials Name Provider Type    RS Mac Collins, PT DPT Physical Therapist                    Exercises       17 0800          Subjective Comments    Subjective Comments Pt is doing well; no new issues at this time.  Overall he feels that he is making improvements each week.  No sharp pain with AROM  -RS      Subjective Pain    Able to rate subjective pain? yes  -RS      Pre-Treatment Pain Level 1  -RS      Post-Treatment Pain Level 2  -RS      Exercise 1    Exercise Name 1 wall:  isometric serratus press using 55 cm physioball  -RS      Cueing 1 Verbal;Tactile  -RS      Sets 1 2  -RS      Reps 1 5  -RS      Time (Seconds) 1 30 sec  -RS      Exercise 2    Exercise Name 2 wall:  rebekah serratus slides CKC  -RS      Cueing 2  Demo  -RS      Sets 2 3  -RS      Reps 2 15  -RS      Exercise 3    Exercise Name 3 (L) isometric ER hold with therabar perturbation   towel roll under the elbow  -RS      Cueing 3 Demo  -RS      Equipment 3 Theraband  -RS      Resistance 3 Yellow  -RS      Sets 3 2  -RS      Reps 3 5  -RS      Time (Seconds) 3 15 sec  -RS        User Key  (r) = Recorded By, (t) = Taken By, (c) = Cosigned By    Initials Name Provider Type    RS Mac Collins, PT DPT Physical Therapist                        Manual Rx (last 36 hours)      Manual Treatments       06/01/17 0900          Manual Rx 1    Manual Rx 1 Location supine posterior cuff  -RS      Manual Rx 1 Type manual stretch with scapula stabilized cross body  -RS      Manual Rx 1 Grade low load long duration  -RS      Manual Rx 1 Duration 10  -RS      Manual Rx 2    Manual Rx 2 Location supine posterior cuff with cross body adduction maintained  -RS      Manual Rx 2 Type IASTM  -RS      Manual Rx 2 Grade min OP  -RS      Manual Rx 2 Duration 15  -RS        User Key  (r) = Recorded By, (t) = Taken By, (c) = Cosigned By    Initials Name Provider Type    RS Mac Collins, PT DPT Physical Therapist                PT OP Goals       06/01/17 0800       PT Short Term Goals    STG Date to Achieve 06/06/17  -RS     STG 1 Patient will improve cross body adduction to the left eye with the scapula stabilized  -RS     STG 1 Progress Ongoing  -RS     STG 1 Progress Comments to the left ear prior; past the left ear after manual  -RS     STG 2 Patient will be knowledgeable of restrictions for ROM   -RS     STG 2 Progress Met  -RS     STG 3 Patient will demonstrate full (L) shoulder PROM in all planes with no significant pain at end range.  -RS     STG 3 Progress Partially Met  -RS     Long Term Goals    LTG Date to Achieve 06/27/17  -RS     LTG 1 Patient will be independent with a comprehensive HEP  -RS     LTG 1 Progress Progressing  -RS     LTG 2 Patient will demonstrate  AROM within 10 degrees of the right shoulder in all planes, demonstrating no superior glide during first half of the range.  -RS     LTG 2 Progress Ongoing  -RS     LTG 3 Patient will demonstrate left rotator cuff strength to at least 4+/5 on MMT by discharge  -RS     LTG 3 Progress Ongoing  -RS     LTG 4 Patient will score <10 on the Quick DASH indicating significant improvement in overall function of the left shoulder with ADL's by discharge.  -RS     LTG 4 Progress Ongoing  -RS     LTG 5 Patient will report symptoms <2/10 with ADL's and leisure activities by discharge.  -RS     LTG 5 Progress Ongoing  -RS     Time Calculation    PT Goal Re-Cert Due Date 06/15/17  -RS       User Key  (r) = Recorded By, (t) = Taken By, (c) = Cosigned By    Initials Name Provider Type    RS Mac Collins, PT DPT Physical Therapist                    Time Calculation:   Start Time: 0800  Stop Time: 0845  Time Calculation (min): 45 min  PT Non-Billable Time (min): 4 min  Total Timed Code Minutes- PT: 41 minute(s)    Therapy Charges for Today     Code Description Service Date Service Provider Modifiers Qty    11219570232 HC PT THER PROC EA 15 MIN 6/1/2017 Mac Collins, PT DPT GP 1    45953038654 HC PT MANUAL THERAPY EA 15 MIN 6/1/2017 Mac Collins, PT DPT GP 2                    ROSALVA Collins, PT DPT  6/1/2017

## 2017-06-07 ENCOUNTER — APPOINTMENT (OUTPATIENT)
Dept: PHYSICAL THERAPY | Facility: HOSPITAL | Age: 72
End: 2017-06-07

## 2017-06-09 ENCOUNTER — HOSPITAL ENCOUNTER (OUTPATIENT)
Dept: PHYSICAL THERAPY | Facility: HOSPITAL | Age: 72
Setting detail: THERAPIES SERIES
Discharge: HOME | End: 2017-06-09

## 2017-06-09 DIAGNOSIS — Z98.890 S/P ROTATOR CUFF REPAIR: Primary | ICD-10-CM

## 2017-06-09 DIAGNOSIS — M25.512 ACUTE PAIN OF LEFT SHOULDER: ICD-10-CM

## 2017-06-09 PROCEDURE — 97140 MANUAL THERAPY 1/> REGIONS: CPT

## 2017-06-09 PROCEDURE — 97110 THERAPEUTIC EXERCISES: CPT

## 2017-06-09 NOTE — THERAPY TREATMENT NOTE
Outpatient Physical Therapy Ortho Treatment Note  [facility]     Patient Name: Michael Novak  : 1945  MRN: [id number]  Today's Date: 2017      Visit Date: 2017    Visit Dx:    ICD-10-CM ICD-9-CM   1. S/P rotator cuff repair Z98.890 V45.89   2. Acute pain of left shoulder M25.512 719.41       There is no problem list on file for this patient.       Past Medical History:   Diagnosis Date   • Back pain    • Coronary artery disease    • Diabetes mellitus    • Hearing deficit    • Hypertension         Past Surgical History:   Procedure Laterality Date   • ROTATOR CUFF REPAIR Left 03/15/2017                             PT Assessment/Plan       17 1159       PT Assessment    Assessment Comments He continues to make more than sufficient progress, but still exhibits some weakness especially when I challenged his core stability with scapular control activities. He doesn't allow upper trap dominance to occur as often as he had before but I did have to give him significant cueing with flexion seated on swiss ball with yellow T band.  -EC     PT Plan    PT Plan Comments Continue with progressive strengthening.  -EC       User Key  (r) = Recorded By, (t) = Taken By, (c) = Cosigned By    Initials Name Provider Type    EC Carter Oliva PTA Physical Therapy Assistant                    Exercises       17 0800          Subjective Comments    Subjective Comments Denies pain, reports muscle soreness, reports doing his exercises  -EC      Subjective Pain    Able to rate subjective pain? yes  -EC      Pre-Treatment Pain Level 0  -EC      Post-Treatment Pain Level 1  -EC      Exercise 1    Exercise Name 1 prone W's and T's  -EC      Equipment 1 --   65 cm swiss ball  -EC      Exercise 2    Exercise Name 2 seated L shoulder flexion  -EC      Equipment 2 Theraband   seated on 65 cm swiss ball   -EC      Resistance 2 Yellow  -EC      Sets 2 2  -EC      Reps 2 10  -EC      Exercise 3    Exercise  Name 3 CW against the wall @ abduction, abduction in scaption, and 90 degrees flexion  -EC      Equipment 3 --   red ball  -EC      Time (Minutes) 3 1   each  -EC      Exercise 4    Exercise Name 4 L serratus puch at the wall  -EC      Equipment 4 --   red ball  -EC      Reps 4 15  -EC        User Key  (r) = Recorded By, (t) = Taken By, (c) = Cosigned By    Initials Name Provider Type    LATISHA Oliva PTA Physical Therapy Assistant              Cervical Protocol Ex       06/09/17 0800          Foam/towel Roll - Posture Stretch    Cueing Verbal;Tactile  -EC      Equipment --   1/2 foam roll  -EC      Time (minutes) 5   progressive  -EC        User Key  (r) = Recorded By, (t) = Taken By, (c) = Cosigned By    Initials Name Provider Type    LATISHA Oliva PTA Physical Therapy Assistant                      Manual Rx (last 36 hours)      Manual Treatments       06/09/17 0700          Manual Rx 1    Manual Rx 1 Location thoracic  -EC      Manual Rx 1 Type prone extension mobilizations  -EC      Manual Rx 1 Grade 2 and 3  -EC      Manual Rx 1 Duration 9  -EC      Manual Rx 2    Manual Rx 2 Location supine posterior cuff with cross body adduction maintained  -EC      Manual Rx 2 Type IASTM  -EC      Manual Rx 2 Grade min OP  -EC      Manual Rx 2 Duration 10  -EC        User Key  (r) = Recorded By, (t) = Taken By, (c) = Cosigned By    Initials Name Provider Type    LATISHA Oliva PTA Physical Therapy Assistant                PT OP Goals       06/09/17 0800       PT Short Term Goals    STG Date to Achieve 06/06/17  -EC     STG 1 Patient will improve cross body adduction to the left eye with the scapula stabilized  -EC     STG 1 Progress Ongoing  -EC     STG 2 Patient will be knowledgeable of restrictions for ROM   -EC     STG 2 Progress Met  -EC     STG 3 Patient will demonstrate full (L) shoulder PROM in all planes with no significant pain at end range.  -EC     STG 3 Progress Partially Met  -EC     Long  Term Goals    LTG Date to Achieve 06/27/17  -EC     LTG 1 Patient will be independent with a comprehensive HEP  -EC     LTG 1 Progress Progressing  -EC     LTG 2 Patient will demonstrate AROM within 10 degrees of the right shoulder in all planes, demonstrating no superior glide during first half of the range.  -EC     LTG 2 Progress Ongoing  -EC     LTG 3 Patient will demonstrate left rotator cuff strength to at least 4+/5 on MMT by discharge  -EC     LTG 3 Progress Ongoing  -EC     LTG 4 Patient will score <10 on the Quick DASH indicating significant improvement in overall function of the left shoulder with ADL's by discharge.  -EC     LTG 4 Progress Ongoing  -EC     LTG 5 Patient will report symptoms <2/10 with ADL's and leisure activities by discharge.  -EC     LTG 5 Progress Ongoing  -EC     LTG 5 Progress Comments 1/10 post session  -EC     Time Calculation    PT Goal Re-Cert Due Date 06/15/17  -EC       User Key  (r) = Recorded By, (t) = Taken By, (c) = Cosigned By    Initials Name Provider Type    LATISHA Oliva PTA Physical Therapy Assistant                Therapy Education       06/09/17 1159          Therapy Education    Given Symptoms/condition management;Posture/body mechanics  -EC      How Provided Verbal  -EC      Provided to Patient  -EC      Level of Understanding Verbalized  -EC        User Key  (r) = Recorded By, (t) = Taken By, (c) = Cosigned By    Initials Name Provider Type    LATISHA Oliva PTA Physical Therapy Assistant                Time Calculation:   Start Time: 0800  Stop Time: 0845  Time Calculation (min): 45 min  Total Timed Code Minutes- PT: 45 minute(s)    Therapy Charges for Today     Code Description Service Date Service Provider Modifiers Qty    96035055865 HC PT MANUAL THERAPY EA 15 MIN 6/9/2017 Carter Oliva PTA GP 1    76944961716 HC PT THER PROC EA 15 MIN 6/9/2017 Carter Oliva PTA GP 2                    Carter Oliva PTA  6/9/2017

## 2017-06-13 ENCOUNTER — HOSPITAL ENCOUNTER (OUTPATIENT)
Dept: PHYSICAL THERAPY | Facility: HOSPITAL | Age: 72
Setting detail: THERAPIES SERIES
Discharge: HOME | End: 2017-06-13

## 2017-06-13 DIAGNOSIS — M25.512 ACUTE PAIN OF LEFT SHOULDER: ICD-10-CM

## 2017-06-13 DIAGNOSIS — Z98.890 S/P ROTATOR CUFF REPAIR: Primary | ICD-10-CM

## 2017-06-13 PROCEDURE — 97110 THERAPEUTIC EXERCISES: CPT

## 2017-06-13 PROCEDURE — 97140 MANUAL THERAPY 1/> REGIONS: CPT

## 2017-06-13 NOTE — THERAPY TREATMENT NOTE
"    Outpatient Physical Therapy Ortho Treatment Note  [facility]     Patient Name: Michael Novak  : 1945  MRN: [id number]  Today's Date: 2017      Visit Date: 2017    Visit Dx:    ICD-10-CM ICD-9-CM   1. S/P rotator cuff repair Z98.890 V45.89   2. Acute pain of left shoulder M25.512 719.41       There is no problem list on file for this patient.       Past Medical History:   Diagnosis Date   • Back pain    • Coronary artery disease    • Diabetes mellitus    • Hearing deficit    • Hypertension         Past Surgical History:   Procedure Laterality Date   • ROTATOR CUFF REPAIR Left 03/15/2017                             PT Assessment/Plan       17 08       PT Assessment    Assessment Comments Patient's L shoulder ROM was reassessed today and found to be within 10 deg of the R shoulder in all planes.  Scapular upward rotation is virtually normal, with the limiting factor being fatigue.  We will continue progressing HEP and working towards full strength before considering discharge.  -RS     PT Plan    PT Plan Comments Continue working on thoracic mobility, scapular mobilizer strengthening, shoulder ROM, and core strength.   -RS       User Key  (r) = Recorded By, (t) = Taken By, (c) = Cosigned By    Initials Name Provider Type    RS Mac Collins, PT DPT Physical Therapist                    Exercises       17 08          Subjective Comments    Subjective Comments Patient reports he feels \"excellent\".  Overall things are better.  -RS      Subjective Pain    Able to rate subjective pain? yes  -RS      Pre-Treatment Pain Level 0  -RS      Post-Treatment Pain Level 0  -RS      Exercise 1    Exercise Name 1 wall facing serratus CW with ball   4 lb  -RS      Cueing 1 Verbal;Demo  -RS      Sets 1 5  -RS      Time (Seconds) 1 15  -RS      Exercise 2    Exercise Name 2 wall facing serratus CCW with ball   4lb  -RS      Cueing 2 Verbal;Demo  -RS      Sets 2 5  -RS      Time " (Seconds) 2 15  -RS      Exercise 3    Exercise Name 3 Wall facing W hold on 45 cm physioball with perturbations  -RS      Cueing 3 Verbal;Demo;Tactile  -RS      Sets 3 5  -RS      Time (Seconds) 3 15  -RS      Exercise 4    Exercise Name 4 Planks on elbows with knees on wobble disc  -RS      Cueing 4 Verbal;Demo  -RS      Sets 4 5  -RS      Time (Seconds) 4 15  -RS      Exercise 5    Exercise Name 5 Wall W's on L side with towel roll  -RS      Cueing 5 Demo  -RS      Sets 5 3  -RS      Reps 5 5  -RS      Exercise 6    Exercise Name 6 Discuss HEP   take out lying flexion with band  -RS      Time (Minutes) 6 5   -RS        User Key  (r) = Recorded By, (t) = Taken By, (c) = Cosigned By    Initials Name Provider Type    NAYAN Collins, PT DPT Physical Therapist              Cervical Protocol Ex       06/09/17 0800          Foam/towel Roll - Posture Stretch    Cueing Verbal;Tactile  -EC      Equipment --   1/2 foam roll  -EC      Time (minutes) 5   progressive  -EC        User Key  (r) = Recorded By, (t) = Taken By, (c) = Cosigned By    Initials Name Provider Type    EC Carter Oliva, MICHAEL Physical Therapy Assistant                      Manual Rx (last 36 hours)      Manual Treatments       06/13/17 0802          Manual Rx 1    Manual Rx 1 Location upper thoracic  -RS      Manual Rx 1 Type Prone extension mobilizations  -RS      Manual Rx 1 Grade 2-3  -RS      Manual Rx 1 Duration 10  -RS        User Key  (r) = Recorded By, (t) = Taken By, (c) = Cosigned By    Initials Name Provider Type    NAYAN Collins, PT DPT Physical Therapist                PT OP Goals       06/13/17 0802       PT Short Term Goals    STG Date to Achieve 06/06/17  -RS     STG 1 Patient will improve cross body adduction to the left eye with the scapula stabilized  -RS     STG 1 Progress Ongoing  -RS     STG 2 Patient will be knowledgeable of restrictions for ROM   -RS     STG 2 Progress Met  -RS     STG 3 Patient will  demonstrate full (L) shoulder PROM in all planes with no significant pain at end range.  -RS     STG 3 Progress Partially Met  -RS     Long Term Goals    LTG Date to Achieve 06/27/17  -RS     LTG 1 Patient will be independent with a comprehensive HEP  -RS     LTG 1 Progress Progressing  -RS     LTG 2 Patient will demonstrate AROM within 10 degrees of the right shoulder in all planes, demonstrating no superior glide during first half of the range.  -RS     LTG 2 Progress Ongoing  -RS     LTG 3 Patient will demonstrate left rotator cuff strength to at least 4+/5 on MMT by discharge  -RS     LTG 3 Progress Ongoing  -RS     LTG 4 Patient will score <10 on the Quick DASH indicating significant improvement in overall function of the left shoulder with ADL's by discharge.  -RS     LTG 4 Progress Ongoing  -RS     LTG 5 Patient will report symptoms <2/10 with ADL's and leisure activities by discharge.  -RS     LTG 5 Progress Partially Met  -RS     LTG 5 Progress Comments Hasn't reported pain above a 2/10 with ADLs in 2 weeks.  -RS     Time Calculation    PT Goal Re-Cert Due Date 06/15/17  -RS       User Key  (r) = Recorded By, (t) = Taken By, (c) = Cosigned By    Initials Name Provider Type    NAYAN Collins, PT DPT Physical Therapist                Therapy Education       06/13/17 0802          Therapy Education    Given Symptoms/condition management;Posture/body mechanics;HEP  -RS      Program New   L side W's on wall with towel roll 3x5  -RS      How Provided Verbal  -RS      Provided to Patient  -RS      Level of Understanding Verbalized  -RS        User Key  (r) = Recorded By, (t) = Taken By, (c) = Cosigned By    Initials Name Provider Type    NAYAN Collins, PT DPT Physical Therapist                Time Calculation:   Start Time: 0802  Stop Time: 0843  Time Calculation (min): 41 min  Total Timed Code Minutes- PT: 41 minute(s)    Therapy Charges for Today     Code Description Service Date Service  Provider Modifiers Qty    97702148821  PT THER PROC EA 15 MIN 6/13/2017 Mac Collins, PT DPT GP 2    82218081258 HC PT MANUAL THERAPY EA 15 MIN 6/13/2017 Mac Collins, PT DPT GP 1                    ROSALVA Collins, PT DPT  6/13/2017

## 2017-06-15 ENCOUNTER — HOSPITAL ENCOUNTER (OUTPATIENT)
Dept: PHYSICAL THERAPY | Facility: HOSPITAL | Age: 72
Setting detail: THERAPIES SERIES
Discharge: HOME | End: 2017-06-15

## 2017-06-15 DIAGNOSIS — M25.512 ACUTE PAIN OF LEFT SHOULDER: ICD-10-CM

## 2017-06-15 DIAGNOSIS — Z98.890 S/P ROTATOR CUFF REPAIR: Primary | ICD-10-CM

## 2017-06-15 PROCEDURE — G8985 CARRY GOAL STATUS: HCPCS

## 2017-06-15 PROCEDURE — G8984 CARRY CURRENT STATUS: HCPCS

## 2017-06-15 PROCEDURE — 97110 THERAPEUTIC EXERCISES: CPT

## 2017-06-15 NOTE — THERAPY PROGRESS REPORT/RE-CERT
Outpatient Physical Therapy Ortho Progress Note   [place]     Patient Name: Michael Novak  : 1945  MRN: [id number]  Today's Date: 6/15/2017      Visit Date: 06/15/2017    Visit Dx:    ICD-10-CM ICD-9-CM   1. S/P rotator cuff repair Z98.890 V45.89   2. Acute pain of left shoulder M25.512 719.41       There is no problem list on file for this patient.       Past Medical History:   Diagnosis Date   • Back pain    • Coronary artery disease    • Diabetes mellitus    • Hearing deficit    • Hypertension         Past Surgical History:   Procedure Laterality Date   • ROTATOR CUFF REPAIR Left 03/15/2017             PT Ortho       06/15/17 0800    Subjective Pain    Able to rate subjective pain? yes  -RS    Pre-Treatment Pain Level 0   0.5  -RS      User Key  (r) = Recorded By, (t) = Taken By, (c) = Cosigned By    Initials Name Provider Type    RS Mac Collins, PT DPT Physical Therapist                            PT Assessment/Plan       06/15/17 0800       PT Assessment    Functional Limitations Performance in work activities;Performance in leisure activities;Performance in self-care ADL  -RS     Impairments Joint mobility;Muscle strength;Pain;Posture;Range of motion  -RS     Assessment Comments Patient is reporting that he is having less pain, but some of the exercises are causing muscle soreness.   At this time, 5 of his goals are met, 2 are partially met and 1 is ongoing.  There was 1 new goal added today, to examine max stress of both the L rotator cuff as well as the patient's abdominals.  The patient's Quickdash score still measured his disability to be 40-59%.  We will continue to work with the patient on progressing towards functional max stress through the rotator cuff as it continues to heal.  We will also continue with providing gradual stress to the cuff given the patient's age, nature of the repair (open), and timeline from injury to repair.  Patient remains motivated to continue  with therapy and we thank you for allowing us to work with this patient.  -RS     Please refer to paper survey for additional self-reported information Yes  -RS     Rehab Potential Good  -RS     Patient/caregiver participated in establishment of treatment plan and goals Yes  -RS     Patient would benefit from skilled therapy intervention Yes  -RS     PT Plan    PT Frequency 2x/week  -RS     Predicted Duration of Therapy Intervention (days/wks) 4-6 weeks  -RS     Planned CPT's? PT THER PROC EA 15 MIN: 12843;PT MANUAL THERAPY EA 15 MIN: 16960;PT NEUROMUSC RE-EDUCATION EA 15 MIN: 11626;PT HOT OR COLD PACK TREAT MCARE;PT ELECTRICAL STIM UNATTEND: ;PT ELECTRICAL STIM ATTD EA 15 MIN: 80526  -RS     Physical Therapy Interventions (Optional Details) home exercise program;joint mobilization;manual therapy techniques;modalities;neuromuscular re-education;postural re-education;patient/family education;ROM (Range of Motion);strengthening;stretching;taping;swiss ball techniques  -RS     PT Plan Comments Progress abdominal work, as well as single arm support, rotator cuff proprioception training and scapulothoracic mobility.   -RS       User Key  (r) = Recorded By, (t) = Taken By, (c) = Cosigned By    Initials Name Provider Type    RS Mac Collins, PT DPT Physical Therapist                    Exercises       06/15/17 0800          Subjective Comments    Subjective Comments Patient reports he was sore after the last therapy session, but it was a muscle soreness and not pain.   -RS      Subjective Pain    Able to rate subjective pain? yes  -RS      Pre-Treatment Pain Level 0   0.5  -RS      Post-Treatment Pain Level 1  -RS      Exercise 1    Exercise Name 1 rebekah UE concentric flexion through full available range  -RS      Cueing 1 Verbal  -RS      Equipment 1 Theraband  -RS      Resistance 1 Yellow  -RS      Sets 1 3  -RS      Reps 1 10  -RS      Exercise 2    Exercise Name 2 rebekah UE eccentric Flexion through full  available range  -RS      Cueing 2 Verbal  -RS      Equipment 2 Theraband  -RS      Resistance 2 Yellow  -RS      Sets 2 2  -RS      Reps 2 10  -RS      Exercise 3    Exercise Name 3 Modified full arm plank with knee pull ins  -RS      Cueing 3 Verbal;Demo  -RS      Sets 3 1  -RS      Reps 3 10  -RS      Exercise 4    Exercise Name 4 CKC plank position with hands on bosu ball  -RS      Cueing 4 Verbal;Demo  -RS      Sets 4 5  -RS      Time (Seconds) 4 15  -RS      Exercise 5    Exercise Name 5 Held push up position with LUE on bosu ball  -RS      Cueing 5 Verbal  -RS      Sets 5 3  -RS      Time (Seconds) 5 12  -RS      Exercise 6    Exercise Name 6 patient filled out the Quick Dash and HEP progression was discussed  -RS      Exercise 7    Exercise Name 7 assessed all goals for recertification  -RS        User Key  (r) = Recorded By, (t) = Taken By, (c) = Cosigned By    Initials Name Provider Type    RS Mac Collins, PT DPT Physical Therapist                               PT OP Goals       06/15/17 0800       PT Short Term Goals    STG Date to Achieve 07/06/17  -RS     STG 1 Patient will improve cross body adduction to the left eye with the scapula stabilized  -RS     STG 1 Progress Partially Met  -RS     STG 1 Progress Comments Cross body adduction was measured to just outside the L ear.   -RS     STG 2 Patient will be knowledgeable of restrictions for ROM   -RS     STG 2 Progress Met  -RS     STG 3 Patient will demonstrate full (L) shoulder PROM in all planes with no significant pain at end range.  -RS     STG 3 Progress Met  -RS     STG 3 Progress Comments PROM is symmetrical bilaterally and pain free.   -RS     STG 4 Patient will be able to hold a traditional plank for 60 seconds with no pain and maintaining proper plank posture (no sagging of the lumbar spine).  -RS     STG 4 Progress New  -RS     Long Term Goals    LTG Date to Achieve 07/27/17  -RS     LTG 1 Patient will be independent with a  comprehensive HEP  -RS     LTG 1 Progress Partially Met  -RS     LTG 1 Progress Comments Reporting compliance with HEP and is progressing well.   -RS     LTG 2 Patient will demonstrate AROM within 10 degrees of the right shoulder in all planes, demonstrating no superior glide during first half of the range.  -RS     LTG 2 Progress Met  -RS     LTG 2 Progress Comments AROM is equal on both sides  -RS     LTG 3 Patient will demonstrate left rotator cuff strength to at least 4+/5 on MMT by discharge  -RS     LTG 3 Progress Met  -RS     LTG 3 Progress Comments IR of the L rotator cuff was measured at 5/5 and ER was measured at 4+/5.  -RS     LTG 4 Patient will score <10 on the Quick DASH indicating significant improvement in overall function of the left shoulder with ADL's by discharge.  -RS     LTG 4 Progress Ongoing  -RS     LTG 4 Progress Comments QuickDash was meeasured at a 40-59% disability with a scored of 29.55.  -RS     LTG 5 Patient will report symptoms <2/10 with ADL's and leisure activities by discharge.  -RS     LTG 5 Progress Met  -RS     LTG 5 Progress Comments Patient hasn't reported pain over a 2/10 in a few weeks.   -RS     Time Calculation    PT Goal Re-Cert Due Date 07/15/17  -RS       User Key  (r) = Recorded By, (t) = Taken By, (c) = Cosigned By    Initials Name Provider Type    NAYAN Collins, PT DPT Physical Therapist                Therapy Education       06/15/17 0803          Therapy Education    Given Symptoms/condition management;Posture/body mechanics;HEP  -RS      Program New   Push up position with cross shoulder taps  -RS      How Provided Verbal  -RS      Provided to Patient  -RS      Level of Understanding Verbalized  -RS        User Key  (r) = Recorded By, (t) = Taken By, (c) = Cosigned By    Initials Name Provider Type    NAYAN Collins, PT DPT Physical Therapist                Outcome Measures       06/15/17 0800          Quick DASH    Open a tight or new jar. 1   -RS      Do heavy household chores (e.g., wash walls, wash floors) 1  -RS      Carry a shopping bag or briefcase 2  -RS      Wash your back 5  -RS      Use a knife to cut food 1  -RS      Recreational activities in which you take some force or impact through your arm, should or hand (e.g. golf, hammering, tennis, etc.) 2  -RS      During the past week, to what extent has your arm, shoulder, or hand problem interfered with your normal social activites with family, friends, neighbors or groups? 3  -RS      During the past week, were you limited in your work or other regular daily activities as a result of your arm, shoulder or hand problem? 3  -RS      Arm, Shoulder, or hand pain 2  -RS      Tingling (pins and needles) in your arm, shoulder, or hand 2  -RS      During the past week, how much difficulty have you had sleeping because of the pain in your arm, shoulder or hand? 2  -RS      Number of Questions Answered 11  -RS      Quick DASH Score 29.55  -RS      Quick Dash Comments 40-59%  -RS      Functional Assessment    Outcome Measure Options Quick DASH  -RS        User Key  (r) = Recorded By, (t) = Taken By, (c) = Cosigned By    Initials Name Provider Type    RS Mac Collins, PT DPT Physical Therapist            Time Calculation:   Start Time: 0803  Stop Time: 0855  Time Calculation (min): 52 min  PT Non-Billable Time (min): 7 min  Total Timed Code Minutes- PT: 45 minute(s)    Therapy Charges for Today     Code Description Service Date Service Provider Modifiers Qty    46644433275 HC PT CARRY MOV HAND OBJ CURRENT 6/15/2017 Mac Collins, PT DPT GP, CK 1    19267527553 HC PT CARRY MOV HAND OBJ PROJECTED 6/15/2017 Mac Collins, PT DPT GP, CI 1    03669593642 HC PT THER PROC EA 15 MIN 6/15/2017 Mac Collins, PT DPT GP 3          PT G-Codes  Outcome Measure Options: Quick DASH  Score: 29.55  Functional Limitation: Carrying, moving and handling objects  Carrying, Moving and Handling  Objects Current Status (): At least 40 percent but less than 60 percent impaired, limited or restricted  Carrying, Moving and Handling Objects Goal Status (): At least 1 percent but less than 20 percent impaired, limited or restricted         ROSALVA Collins, PT DPT  6/15/2017

## 2017-06-19 ENCOUNTER — HOSPITAL ENCOUNTER (OUTPATIENT)
Dept: PHYSICAL THERAPY | Facility: HOSPITAL | Age: 72
Setting detail: THERAPIES SERIES
Discharge: HOME | End: 2017-06-19

## 2017-06-19 DIAGNOSIS — M25.512 ACUTE PAIN OF LEFT SHOULDER: ICD-10-CM

## 2017-06-19 DIAGNOSIS — Z98.890 S/P ROTATOR CUFF REPAIR: Primary | ICD-10-CM

## 2017-06-19 PROCEDURE — 97110 THERAPEUTIC EXERCISES: CPT

## 2017-06-19 NOTE — THERAPY TREATMENT NOTE
Outpatient Physical Therapy Ortho Treatment Note   [place]     Patient Name: Michael Novak  : 1945  MRN: [id number]  Today's Date: 2017      Visit Date: 2017    Visit Dx:    ICD-10-CM ICD-9-CM   1. S/P rotator cuff repair Z98.890 V45.89   2. Acute pain of left shoulder M25.512 719.41       There is no problem list on file for this patient.       Past Medical History:   Diagnosis Date   • Back pain    • Coronary artery disease    • Diabetes mellitus    • Hearing deficit    • Hypertension         Past Surgical History:   Procedure Laterality Date   • ROTATOR CUFF REPAIR Left 03/15/2017                             PT Assessment/Plan       17 0847       PT Assessment    Assessment Comments Patient reported an increase in muscle fatigue today when leaving.  The purpose of today was to examine how the patient moves when the rotator cuff is fatigued.  The patient did demonstrate a pec major compensation, as well as trunk rotation and upper trap activation.  The patient was cued on these compensations, and worked through the fatigue without improper movement patterns.  Patient is progressing nicely, and we will continue to work on movement patterns with fatigue.   -RS (r) FM (t) RS (c)     PT Plan    PT Plan Comments Continue working on movement patterns with rotator cuff fatigue.   -RS (r) FM (t) RS (c)       User Key  (r) = Recorded By, (t) = Taken By, (c) = Cosigned By    Initials Name Provider Type    RS Mac Collins, PT DPT Physical Therapist    ANA Keita, PT Student PT Student                    Exercises       17 0899          Subjective Comments    Subjective Comments Patient reports no changes in symptoms.  Handling HEP well and didn't have problems after last therapy session.   -RS (r) FM (t) RS (c)      Subjective Pain    Able to rate subjective pain? yes  -RS (r) FM (t) RS (c)      Pre-Treatment Pain Level 0  -RS (r) FM (t) RS (c)      Post-Treatment  Pain Level 2  -RS (r) FM (t) RS (c)      Exercise 1    Exercise Name 1 Supine AAROM into flex/ext and ER/IR  -RS (r) FM (t) RS (c)      Cueing 1 Verbal;Tactile  -RS (r) FM (t) RS (c)      Time (Minutes) 1 5  -RS (r) FM (t) RS (c)      Exercise 2    Exercise Name 2 R side-lying ER/IR  -RS (r) FM (t) RS (c)      Cueing 2 Verbal  -RS (r) FM (t) RS (c)      Equipment 2 [place]umb[place]  -RS (r) FM (t) RS (c)      Weights/Plates 2 1  -RS (r) FM (t) RS (c)      Time (Minutes) 2 3  -RS (r) FM (t) RS (c)      Exercise 3    Exercise Name 3 R side-lying flexion to 90 deg   -RS (r) FM (t) RS (c)      Cueing 3 Verbal  -RS (r) FM (t) RS (c)      Equipment 3 [place]umbell  -RS (r) FM (t) RS (c)      Weights/Plates 3 1  -RS (r) FM (t) RS (c)      Sets 3 2  -RS (r) FM (t) RS (c)      Time (Minutes) 3 2  -RS (r) FM (t) RS (c)      Exercise 4    Exercise Name 4 Prone held abduction with scapular adduction  -RS (r) FM (t) RS (c)      Cueing 4 Verbal  -RS (r) FM (t) RS (c)      Equipment 4 [place]umbell  -RS (r) FM (t) RS (c)      Weights/Plates 4 1  -RS (r) FM (t) RS (c)      Sets 4 5  -RS (r) FM (t) RS (c)      Time (Seconds) 4 30  -RS (r) FM (t) RS (c)      Exercise 5    Exercise Name 5 Prone isometric ER holds  -RS (r) FM (t) RS (c)      Cueing 5 Verbal  -RS (r) FM (t) RS (c)      Sets 5 5  -RS (r) FM (t) RS (c)      Time (Seconds) 5 30  -RS (r) FM (t) RS (c)      Exercise 6    Exercise Name 6 Prone active ER with shoulder at 90 deg   -RS (r) FM (t) RS (c)      Cueing 6 Verbal  -RS (r) FM (t) RS (c)      Sets 6 3  -RS (r) FM (t) RS (c)      Reps 6 10  -RS (r) FM (t) RS (c)      Exercise 7    Exercise Name 7 scapular clocks with (R) UE moving from 1-5; left UE held at 90 deg flexion CKC  -RS      Cueing 7 Verbal  -RS (r) FM (t) RS (c)      Equipment 7 Theraband  -RS (r) FM (t) RS (c)      Resistance 7 Red  -RS (r) FM (t) RS (c)      Sets 7 3  -RS (r) FM (t) RS (c)      Reps 7 10  -RS (r) FM (t) RS (c)        User Key  (r) = Recorded By, (t) = Taken By,  (c) = Cosigned By    Initials Name Provider Type    RS Mac Collins, PT DPT Physical Therapist    FM Darline Keita, PT Student PT Student                               PT OP Goals       06/19/17 0953 06/19/17 0847    PT Short Term Goals    STG Date to Achieve  07/06/17  -RS (r) FM (t) RS (c)    STG 1  Patient will improve cross body adduction to the left eye with the scapula stabilized  -RS (r) FM (t) RS (c)    STG 1 Progress  Partially Met  -RS (r) FM (t) RS (c)    STG 2  Patient will be knowledgeable of restrictions for ROM   -RS (r) FM (t) RS (c)    STG 2 Progress  Met  -RS (r) FM (t) RS (c)    STG 3  Patient will demonstrate full (L) shoulder PROM in all planes with no significant pain at end range.  -RS (r) FM (t) RS (c)    STG 3 Progress  Met  -RS (r) FM (t) RS (c)    STG 4  Patient will be able to hold a traditional plank for 60 seconds with no pain and maintaining proper plank posture (no sagging of the lumbar spine).  -RS (r) FM (t) RS (c)    STG 4 Progress  Ongoing  -RS    Long Term Goals    LTG Date to Achieve  07/27/17  -RS (r) FM (t) RS (c)    LTG 1  Patient will be independent with a comprehensive HEP  -RS (r) FM (t) RS (c)    LTG 1 Progress  Partially Met  -RS (r) FM (t) RS (c)    LTG 2  Patient will demonstrate AROM within 10 degrees of the right shoulder in all planes, demonstrating no superior glide during first half of the range.  -RS (r) FM (t) RS (c)    LTG 2 Progress  Met  -RS (r) FM (t) RS (c)    LTG 3  Patient will demonstrate left rotator cuff strength to at least 4+/5 on MMT by discharge  -RS (r) FM (t) RS (c)    LTG 3 Progress  Met  -RS (r) FM (t) RS (c)    LTG 4  Patient will score <10 on the Quick DASH indicating significant improvement in overall function of the left shoulder with ADL's by discharge.  -RS (r) FM (t) RS (c)    LTG 4 Progress  Ongoing  -RS (r) FM (t) RS (c)    LTG 5  Patient will report symptoms <2/10 with ADL's and leisure activities by discharge.  -RS (r) FM (t)  RS (c)    LTG 5 Progress  Met  -RS (r) FM (t) RS (c)    Time Calculation    PT Goal Re-Cert Due Date 07/15/17  -RS --  -RS      User Key  (r) = Recorded By, (t) = Taken By, (c) = Cosigned By    Initials Name Provider Type    NAYAN Collins, PT DPT Physical Therapist    ANA Keita, PT Student PT Student                Therapy Education       06/19/17 0847          Therapy Education    Given Symptoms/condition management;Posture/body mechanics;HEP  -RS (r) FM (t) RS (c)      Program Reinforced   Push up position with cross shoulder taps  -RS (r) FM (t) RS (c)      How Provided Verbal  -RS (r) FM (t) RS (c)      Provided to Patient  -RS (r) FM (t) RS (c)      Level of Understanding Verbalized  -RS (r) FM (t) RS (c)        User Key  (r) = Recorded By, (t) = Taken By, (c) = Cosigned By    Initials Name Provider Type    RS Mac Collins, PT DPT Physical Therapist    ANA Keita, PT Student PT Student                Time Calculation:   Start Time: 0847  Stop Time: 0932  Time Calculation (min): 45 min  PT Non-Billable Time (min): 4 min  Total Timed Code Minutes- PT: 41 minute(s)                  ROSALVA Collins, PT DPT  6/19/2017

## 2017-06-22 ENCOUNTER — HOSPITAL ENCOUNTER (OUTPATIENT)
Dept: PHYSICAL THERAPY | Facility: HOSPITAL | Age: 72
Setting detail: THERAPIES SERIES
Discharge: HOME | End: 2017-06-22

## 2017-06-22 DIAGNOSIS — M25.512 ACUTE PAIN OF LEFT SHOULDER: ICD-10-CM

## 2017-06-22 DIAGNOSIS — Z98.890 S/P ROTATOR CUFF REPAIR: Primary | ICD-10-CM

## 2017-06-22 PROCEDURE — 97110 THERAPEUTIC EXERCISES: CPT

## 2017-06-22 NOTE — THERAPY TREATMENT NOTE
"    Outpatient Physical Therapy Ortho Treatment Note  [facility]     Patient Name: Michael Novak  : 1945  MRN: [id number]  Today's Date: 2017      Visit Date: 2017    Visit Dx:    ICD-10-CM ICD-9-CM   1. S/P rotator cuff repair Z98.890 V45.89   2. Acute pain of left shoulder M25.512 719.41       There is no problem list on file for this patient.       Past Medical History:   Diagnosis Date   • Back pain    • Coronary artery disease    • Diabetes mellitus    • Hearing deficit    • Hypertension         Past Surgical History:   Procedure Laterality Date   • ROTATOR CUFF REPAIR Left 03/15/2017                             PT Assessment/Plan       17 0800       PT Assessment    Assessment Comments Patient was worked to rotator cuff fatigue and then tested on his ability to control his scapulae with movement.  He was able to achieve upward rotation, but his scapulae are still excessively protracted.  We started working on scapular retraction, and the HEP was updated to include some of these exercises.   -RS     PT Plan    PT Plan Comments Continue working on functional activites with the rotator cuff fatigued.  The patient's scapulae are still protracted, so working on retraction without upper trap activation will be important.   -RS       User Key  (r) = Recorded By, (t) = Taken By, (c) = Cosigned By    Initials Name Provider Type    RS Mac Collins, PT DPT Physical Therapist                    Exercises       17 0801          Subjective Comments    Subjective Comments Pt reports compliance with HEP and that he's doing \"excellent\".  -RS (r) FM (t) RS (c)      Subjective Pain    Able to rate subjective pain? yes  -RS (r) FM (t) RS (c)      Pre-Treatment Pain Level 0  -RS (r) FM (t) RS (c)      Post-Treatment Pain Level 0  -RS      Exercise 1    Exercise Name 1 Standing \"W\" neutral L shoulder and elbow at 90 deg with side steps holding the cybex into ER  -RS      Cueing 1 " "Verbal  -RS      Equipment 1 Other   cybex  -RS      Weights/Plates 1 1  -RS      Sets 1 3  -RS      Reps 1 10  -RS      Exercise 2    Exercise Name 2 Standing \"W\" neutral L shoulder and elbow at 90 deg with side steps holding the cybex into IR  -RS      Cueing 2 Verbal  -RS      Equipment 2 Other   cybex  -RS      Weights/Plates 2 1  -RS      Sets 2 3  -RS      Reps 2 10  -RS      Exercise 3    Exercise Name 3 Facing wall arms at 90/90 flex all the way into serratus punch  -RS      Cueing 3 Verbal  -RS      Sets 3 3  -RS      Reps 3 10  -RS      Exercise 4    Exercise Name 4 Facing wall with forearms on 45 cm physioball moving in CW/CCW circles  -RS      Cueing 4 Verbal  -RS      Sets 4 3  -RS      Time (Seconds) 4 30  -RS      Exercise 5    Exercise Name 5 Sitting EOB W's with theraband  -RS      Cueing 5 Verbal  -RS      Equipment 5 Theraband  -RS      Resistance 5 Red  -RS      Sets 5 3  -RS      Reps 5 10  -RS        User Key  (r) = Recorded By, (t) = Taken By, (c) = Cosigned By    Initials Name Provider Type    RS Mac Collins, PT DPT Physical Therapist    ANA Keita, PT Student PT Student                               PT OP Goals       06/22/17 0801       PT Short Term Goals    STG Date to Achieve 07/06/17  -RS (r) FM (t) RS (c)     STG 1 Patient will improve cross body adduction to the left eye with the scapula stabilized  -RS (r) FM (t) RS (c)     STG 1 Progress Partially Met  -RS (r) FM (t) RS (c)     STG 2 Patient will be knowledgeable of restrictions for ROM   -RS (r) FM (t) RS (c)     STG 2 Progress Met  -RS (r) FM (t) RS (c)     STG 3 Patient will demonstrate full (L) shoulder PROM in all planes with no significant pain at end range.  -RS (r) FM (t) RS (c)     STG 3 Progress Met  -RS (r) FM (t) RS (c)     STG 4 Patient will be able to hold a traditional plank for 60 seconds with no pain and maintaining proper plank posture (no sagging of the lumbar spine).  -RS (r) FM (t) RS (c)     STG 4 " Progress Ongoing  -RS (r) FM (t) RS (c)     STG 4 Progress Comments Modified plank positions were achieved today on a 45cm physioball against the wall for 30 secs.   -RS     Long Term Goals    LTG Date to Achieve 07/27/17  -RS (r) FM (t) RS (c)     LTG 1 Patient will be independent with a comprehensive HEP  -RS (r) FM (t) RS (c)     LTG 1 Progress Partially Met  -RS (r) FM (t) RS (c)     LTG 2 Patient will demonstrate AROM within 10 degrees of the right shoulder in all planes, demonstrating no superior glide during first half of the range.  -RS (r) FM (t) RS (c)     LTG 2 Progress Met  -RS (r) FM (t) RS (c)     LTG 3 Patient will demonstrate left rotator cuff strength to at least 4+/5 on MMT by discharge  -RS (r) FM (t) RS (c)     LTG 3 Progress Met  -RS (r) FM (t) RS (c)     LTG 4 Patient will score <10 on the Quick DASH indicating significant improvement in overall function of the left shoulder with ADL's by discharge.  -RS (r) FM (t) RS (c)     LTG 4 Progress Ongoing  -RS (r) FM (t) RS (c)     LTG 5 Patient will report symptoms <2/10 with ADL's and leisure activities by discharge.  -RS (r) FM (t) RS (c)     LTG 5 Progress Met  -RS (r) FM (t) RS (c)     Time Calculation    PT Goal Re-Cert Due Date 07/15/17  -RS (r) FM (t) RS (c)       User Key  (r) = Recorded By, (t) = Taken By, (c) = Cosigned By    Initials Name Provider Type    RS Mac Collins, PT DPT Physical Therapist    ANA Keita, PT Student PT Student                Therapy Education       06/22/17 0801          Therapy Education    Given Symptoms/condition management;Posture/body mechanics;HEP  -RS (r) FM (t) RS (c)      Program New   Theraband W's with green theraband   -RS      How Provided Verbal  -RS (r) FM (t) RS (c)      Provided to Patient  -RS (r) FM (t) RS (c)      Level of Understanding Verbalized;Demonstrated  -RS        User Key  (r) = Recorded By, (t) = Taken By, (c) = Cosigned By    Initials Name Provider Type    NAYAN Watts  Darius Collins, PT DPT Physical Therapist    ANA Keita, PT Student PT Student                Time Calculation:   Start Time: 0801  Stop Time: 0845  Time Calculation (min): 44 min    Therapy Charges for Today     Code Description Service Date Service Provider Modifiers Qty    28422222867 HC PT THER PROC EA 15 MIN 6/22/2017 Mac Collins, PT DPT GP 3                    ROSALVA Collins, PT DPT  6/22/2017

## 2017-06-27 ENCOUNTER — HOSPITAL ENCOUNTER (OUTPATIENT)
Dept: PHYSICAL THERAPY | Facility: HOSPITAL | Age: 72
Setting detail: THERAPIES SERIES
Discharge: HOME | End: 2017-06-27

## 2017-06-27 DIAGNOSIS — Z98.890 S/P ROTATOR CUFF REPAIR: Primary | ICD-10-CM

## 2017-06-27 DIAGNOSIS — M25.512 ACUTE PAIN OF LEFT SHOULDER: ICD-10-CM

## 2017-06-27 PROCEDURE — 97110 THERAPEUTIC EXERCISES: CPT

## 2017-06-27 NOTE — THERAPY TREATMENT NOTE
Outpatient Physical Therapy Ortho Treatment Note  [facility]     Patient Name: Michael Novak  : 1945  MRN: [id number]  Today's Date: 2017      Visit Date: 2017    Visit Dx:    ICD-10-CM ICD-9-CM   1. S/P rotator cuff repair Z98.890 V45.89   2. Acute pain of left shoulder M25.512 719.41       There is no problem list on file for this patient.       Past Medical History:   Diagnosis Date   • Back pain    • Coronary artery disease    • Diabetes mellitus    • Hearing deficit    • Hypertension         Past Surgical History:   Procedure Laterality Date   • ROTATOR CUFF REPAIR Left 03/15/2017                             PT Assessment/Plan       17 0800       PT Assessment    Assessment Comments Patient is progressing very well, and is able to achieve stability even with uneven surfaces and unexpected movements.  The patient was progressed today to standing isometric holds with an emphasis on maintaining a proper posture and not utilizing his upper traps.  He was also progressed with unexpected arm movements via ball tossing in different directions with a weighted ball.  The patient managed these new progressions well, and utilized his scapular stabilizers appropriately.  -RS     PT Plan    PT Plan Comments Continue working on the rotator cuff strength through fatigue.  Also continue the progression with unexpected movements and surfaces.  -RS       User Key  (r) = Recorded By, (t) = Taken By, (c) = Cosigned By    Initials Name Provider Type    RS Mac Collins, PT DPT Physical Therapist                    Exercises       17 0800          Subjective Comments    Subjective Comments Pt reports he is doing the same and feeling much better.  He claims the major thing stil causing him trouble is putting his arm behind his back.   -RS      Subjective Pain    Able to rate subjective pain? yes  -RS      Pre-Treatment Pain Level 0  -RS      Post-Treatment Pain Level 1  -RS       Exercise 1    Exercise Name 1 Single hand catch with a 1lb weight in different parts of body  -RS      Cueing 1 Verbal  -RS      Weights/Plates 1 1  -RS      Sets 1 3  -RS      Time (Minutes) 1 1  -RS      Exercise 2    Exercise Name 2 Shoulder blade pushups with hands on uneven surface  -RS      Cueing 2 Verbal;Demo  -RS      Sets 2 3  -RS      Reps 2 10  -RS      Exercise 3    Exercise Name 3 Walk out modified planks with hands on uneven surfaces - lift RUE  -RS      Cueing 3 Verbal;Demo  -RS      Sets 3 3  -RS      Reps 3 5  -RS      Exercise 4    Exercise Name 4 Isometric L flex holds with weight at 45 deg, 90 deg and 120 deg  -RS      Cueing 4 Verbal  -RS      Equipment 4 Dumbell  -RS      Weights/Plates 4 2  -RS      Sets 4 Other   6  -RS      Time (Seconds) 4 30  -RS      Exercise 5    Exercise Name 5 Isometric L abd with weight at 45 deg, 90 deg and 110 deg  -RS      Cueing 5 Verbal  -RS      Equipment 5 Dumbell  -RS      Weights/Plates 5 2  -RS      Sets 5 Other   6  -RS      Time (Seconds) 5 30  -RS        User Key  (r) = Recorded By, (t) = Taken By, (c) = Cosigned By    Initials Name Provider Type    RS Mac Collins, PT DPT Physical Therapist                               PT OP Goals       06/27/17 0854 06/27/17 0800    PT Short Term Goals    STG Date to Achieve  07/06/17  -RS    STG 1  Patient will improve cross body adduction to the left eye with the scapula stabilized  -RS    STG 1 Progress  Partially Met  -RS    STG 2  Patient will be knowledgeable of restrictions for ROM   -RS    STG 2 Progress  Met  -RS    STG 3  Patient will demonstrate full (L) shoulder PROM in all planes with no significant pain at end range.  -RS    STG 3 Progress  Met  -RS    STG 4  Patient will be able to hold a traditional plank for 60 seconds with no pain and maintaining proper plank posture (no sagging of the lumbar spine).  -RS    STG 4 Progress  Ongoing  -RS    STG 4 Progress Comments  Patient was able to hold a  modified plank on uneven surfaces while lifting up his R hand.  -RS    Long Term Goals    LTG Date to Achieve  07/27/17  -RS    LTG 1  Patient will be independent with a comprehensive HEP  -RS    LTG 1 Progress  Partially Met  -RS    LTG 2  Patient will demonstrate AROM within 10 degrees of the right shoulder in all planes, demonstrating no superior glide during first half of the range.  -RS    LTG 2 Progress  Met  -RS    LTG 3  Patient will demonstrate left rotator cuff strength to at least 4+/5 on MMT by discharge  -RS    LTG 3 Progress  Met  -RS    LTG 4  Patient will score <10 on the Quick DASH indicating significant improvement in overall function of the left shoulder with ADL's by discharge.  -RS    LTG 4 Progress  Ongoing  -RS    LTG 5  Patient will report symptoms <2/10 with ADL's and leisure activities by discharge.  -RS    LTG 5 Progress  Met  -RS    Time Calculation    PT Goal Re-Cert Due Date 07/15/17  -RS --  -RS      User Key  (r) = Recorded By, (t) = Taken By, (c) = Cosigned By    Initials Name Provider Type    NAYAN Collins, PT DPT Physical Therapist                Therapy Education       06/27/17 0800          Therapy Education    Given Symptoms/condition management;Posture/body mechanics;HEP  -RS      Program Reinforced   Theraband W's with green theraband   -RS      How Provided Verbal  -RS      Provided to Patient  -RS      Level of Understanding Verbalized  -RS        User Key  (r) = Recorded By, (t) = Taken By, (c) = Cosigned By    Initials Name Provider Type    NAYAN Collins, PT DPT Physical Therapist                Time Calculation:   Start Time: 0802  Stop Time: 0847  Time Calculation (min): 45 min  PT Non-Billable Time (min): 4 min  Total Timed Code Minutes- PT: 41 minute(s)    Therapy Charges for Today     Code Description Service Date Service Provider Modifiers Qty    19227632689 HC PT THER PROC EA 15 MIN 6/27/2017 Mac Collins, PT DPT GP 3                     ROSALVA Collins, PT DPT  6/27/2017

## 2017-06-29 ENCOUNTER — HOSPITAL ENCOUNTER (OUTPATIENT)
Dept: PHYSICAL THERAPY | Facility: HOSPITAL | Age: 72
Setting detail: THERAPIES SERIES
Discharge: HOME | End: 2017-06-29

## 2017-06-29 DIAGNOSIS — Z98.890 S/P ROTATOR CUFF REPAIR: Primary | ICD-10-CM

## 2017-06-29 DIAGNOSIS — M25.512 ACUTE PAIN OF LEFT SHOULDER: ICD-10-CM

## 2017-06-29 PROCEDURE — 97110 THERAPEUTIC EXERCISES: CPT

## 2017-06-29 PROCEDURE — G8985 CARRY GOAL STATUS: HCPCS

## 2017-06-29 PROCEDURE — G8984 CARRY CURRENT STATUS: HCPCS

## 2017-06-29 NOTE — THERAPY PROGRESS REPORT/RE-CERT
Outpatient Physical Therapy Ortho Progress Note  [facility]     Patient Name: Michael Novak  : 1945  MRN: [id number]  Today's Date: 2017      Visit Date: 2017    Visit Dx:    ICD-10-CM ICD-9-CM   1. S/P rotator cuff repair Z98.890 V45.89   2. Acute pain of left shoulder M25.512 719.41       There is no problem list on file for this patient.       Past Medical History:   Diagnosis Date   • Back pain    • Coronary artery disease    • Diabetes mellitus    • Hearing deficit    • Hypertension         Past Surgical History:   Procedure Laterality Date   • ROTATOR CUFF REPAIR Left 03/15/2017                             PT Assessment/Plan       17 0800       PT Assessment    Functional Limitations Performance in work activities;Performance in leisure activities;Performance in self-care ADL  -RS     Impairments Joint mobility;Muscle strength;Pain;Posture;Range of motion  -RS     Assessment Comments Pt reports he is 90% better in regards to flexibility, and 75% better in regards to strength.  He scored a 20-39% disability on the quick DASH.  At this time, the patient is progressing well, but needing follow up to assess the effectiveness of his HEP and insure he is performing the exercises appropriately.  His ROM is symmetrical bilaterally, but he is still having some strength deficits.  We plan to continue on a limited basis at 1x/week every other week to follow up over the next few weeks.  Thank you for allowing us to work with this patient.  -RS     Please refer to paper survey for additional self-reported information Yes  -RS     Rehab Potential Good  -RS     Patient/caregiver participated in establishment of treatment plan and goals Yes  -RS     Patient would benefit from skilled therapy intervention Yes  -RS     PT Plan    PT Frequency 1x/week  -RS     Predicted Duration of Therapy Intervention (days/wks) 4-6 weeks  -RS     Planned CPT's? PT THER PROC EA 15 MIN: 14601;PT MANUAL  THERAPY EA 15 MIN: 78289;PT NEUROMUSC RE-EDUCATION EA 15 MIN: 37382  -RS     Physical Therapy Interventions (Optional Details) home exercise program;joint mobilization;neuromuscular re-education;patient/family education;postural re-education;strengthening;stretching;swiss ball techniques;taping  -RS     PT Plan Comments Continue working on strength and scapular movement.  Make sure to progress HEP as we prepare for d/c.  -RS       User Key  (r) = Recorded By, (t) = Taken By, (c) = Cosigned By    Initials Name Provider Type    RS Mac Collins, PT DPT Physical Therapist                    Exercises       06/29/17 0800          Subjective Comments    Subjective Comments Pt states that he is doing well today.  he was sore after the last session.  -RS      Subjective Pain    Able to rate subjective pain? yes  -RS      Pre-Treatment Pain Level 0  -RS      Exercise 1    Exercise Name 1 Side-lying manual resisted ER/IR   -RS      Cueing 1 Verbal;Tactile  -RS      Sets 1 3  -RS      Reps 1 15  -RS      Exercise 2    Exercise Name 2 Side-lying ER perturbations with a flexbar   -RS      Cueing 2 Verbal  -RS      Sets 2 3  -RS      Time (Seconds) 2 40  -RS      Exercise 3    Exercise Name 3 Side-lying full LUE perturbations with therabar  -RS      Cueing 3 Verbal  -RS      Sets 3 1  -RS      Time (Seconds) 3 30  -RS      Exercise 4    Exercise Name 4 B chops on cybex  -RS      Cueing 4 Verbal  -RS      Weights/Plates 4 3  -RS      Sets 4 3  -RS      Reps 4 10  -RS      Exercise 5    Exercise Name 5 B reverse chops on cybex  -RS      Cueing 5 Verbal  -RS      Weights/Plates 5 3  -RS      Sets 5 3  -RS      Reps 5 10  -RS        User Key  (r) = Recorded By, (t) = Taken By, (c) = Cosigned By    Initials Name Provider Type    RS Mac Collins, PT DPT Physical Therapist                               PT OP Goals       06/29/17 0900 06/29/17 0700    PT Short Term Goals    STG Date to Achieve  07/06/17  -RS    STG 1   Patient will improve cross body adduction to the left eye with the scapula stabilized  -RS    STG 1 Progress  Partially Met  -RS    STG 2  Patient will be knowledgeable of restrictions for ROM   -RS    STG 2 Progress  Met  -RS    STG 3  Patient will demonstrate full (L) shoulder PROM in all planes with no significant pain at end range.  -RS    STG 3 Progress  Met  -RS    STG 4  Patient will be able to hold a traditional plank for 60 seconds with no pain and maintaining proper plank posture (no sagging of the lumbar spine).  -RS    STG 4 Progress  Ongoing  -RS    Long Term Goals    LTG Date to Achieve  07/27/17  -RS    LTG 1  Patient will be independent with a comprehensive HEP  -RS    LTG 1 Progress  Partially Met  -RS    LTG 2  Patient will demonstrate AROM within 10 degrees of the right shoulder in all planes, demonstrating no superior glide during first half of the range.  -RS    LTG 2 Progress  Met  -RS    LTG 3  Patient will demonstrate left rotator cuff strength to at least 4+/5 on MMT by discharge  -RS    LTG 3 Progress  Met  -RS    LTG 4  Patient will score <10 on the Quick DASH indicating significant improvement in overall function of the left shoulder with ADL's by discharge.  -RS    LTG 4 Progress  Progressing  -RS    LTG 4 Progress Comments  Patient scored 25 points, for a 20-39% disability.   -RS    LTG 5  Patient will report symptoms <2/10 with ADL's and leisure activities by discharge.  -RS    LTG 5 Progress  Met  -RS    Time Calculation    PT Goal Re-Cert Due Date --  -RS 07/15/17  -RS      User Key  (r) = Recorded By, (t) = Taken By, (c) = Cosigned By    Initials Name Provider Type    RS Mac Collins, PT DPT Physical Therapist                Therapy Education       06/29/17 0800          Therapy Education    Given Symptoms/condition management;Posture/body mechanics;HEP  -RS      Program Reinforced   informed pt to keep up with exercises until more visits   -RS      How Provided Verbal  -RS       Provided to Patient  -RS      Level of Understanding Verbalized  -RS        User Key  (r) = Recorded By, (t) = Taken By, (c) = Cosigned By    Initials Name Provider Type    RS Mac Collins, PT DPT Physical Therapist                Outcome Measures       06/29/17 0800          Quick DASH    Open a tight or new jar. 1  -RS      Do heavy household chores (e.g., wash walls, wash floors) 2  -RS      Carry a shopping bag or briefcase 1  -RS      Wash your back 3  -RS      Use a knife to cut food 1  -RS      Recreational activities in which you take some force or impact through your arm, should or hand (e.g. golf, hammering, tennis, etc.) 4  -RS      During the past week, to what extent has your arm, shoulder, or hand problem interfered with your normal social activites with family, friends, neighbors or groups? 2  -RS      During the past week, were you limited in your work or other regular daily activities as a result of your arm, shoulder or hand problem? 2  -RS      Arm, Shoulder, or hand pain 2  -RS      Tingling (pins and needles) in your arm, shoulder, or hand 2  -RS      During the past week, how much difficulty have you had sleeping because of the pain in your arm, shoulder or hand? 2  -RS      Number of Questions Answered 11  -RS      Quick DASH Score 25  -RS      Quick Dash Comments 20-39%  -RS      Functional Assessment    Outcome Measure Options Quick DASH  -RS        User Key  (r) = Recorded By, (t) = Taken By, (c) = Cosigned By    Initials Name Provider Type    RS Mac Collins, PT DPT Physical Therapist            Time Calculation:   Start Time: 0800  Stop Time: 0845  Time Calculation (min): 45 min  PT Non-Billable Time (min): 4 min  Total Timed Code Minutes- PT: 41 minute(s)    Therapy Charges for Today     Code Description Service Date Service Provider Modifiers Qty    62464031359 HC PT THER PROC EA 15 MIN 6/29/2017 Mac Collins, PT DPT GP 3    [id number]  PT CARRY MOV  HAND OBJ CURRENT 6/29/2017 Mac Collins, PT DPT GP, CI 1    94458671040 HC PT CARRY MOV HAND OBJ PROJECTED 6/29/2017 Mac Collins, PT DPT GP, CI 1          PT G-Codes  Outcome Measure Options: Quick DASH  Score: 25  Functional Limitation: Carrying, moving and handling objects  Carrying, Moving and Handling Objects Current Status (): At least 1 percent but less than 20 percent impaired, limited or restricted  Carrying, Moving and Handling Objects Goal Status (): At least 1 percent but less than 20 percent impaired, limited or restricted         ROSALVA Collins, PT DPT  6/29/2017

## 2017-07-06 ENCOUNTER — HOSPITAL ENCOUNTER (OUTPATIENT)
Dept: PHYSICAL THERAPY | Facility: HOSPITAL | Age: 72
Setting detail: THERAPIES SERIES
Discharge: HOME | End: 2017-07-06

## 2017-07-06 DIAGNOSIS — M25.512 ACUTE PAIN OF LEFT SHOULDER: ICD-10-CM

## 2017-07-06 DIAGNOSIS — Z98.890 S/P ROTATOR CUFF REPAIR: Primary | ICD-10-CM

## 2017-07-06 PROCEDURE — 97110 THERAPEUTIC EXERCISES: CPT

## 2017-07-06 PROCEDURE — 97140 MANUAL THERAPY 1/> REGIONS: CPT

## 2017-07-06 NOTE — THERAPY TREATMENT NOTE
Outpatient Physical Therapy Ortho Treatment Note  [facility]     Patient Name: Michael Novak  : 1945  MRN: [id number]  Today's Date: 2017      Visit Date: 2017    Visit Dx:    ICD-10-CM ICD-9-CM   1. S/P rotator cuff repair Z98.890 V45.89   2. Acute pain of left shoulder M25.512 719.41       There is no problem list on file for this patient.       Past Medical History:   Diagnosis Date   • Back pain    • Coronary artery disease    • Diabetes mellitus    • Hearing deficit    • Hypertension         Past Surgical History:   Procedure Laterality Date   • ROTATOR CUFF REPAIR Left 03/15/2017                             PT Assessment/Plan       17 1152       PT Assessment    Assessment Comments Pt continues to make functional progress, he reports today that he is much better and is in the process of building and painting a set of kitchen cabinets which he has been hanging as well. Considering that is a challenging physical activity and he has had no increase in difficulties, reduction in AROM, and increase in pain symptoms this confirms he is approaching an appropriate for D/C status.   -EC     PT Plan    PT Plan Comments Continue working on scapular stability and review the new HEP component.  -EC       User Key  (r) = Recorded By, (t) = Taken By, (c) = Cosigned By    Initials Name Provider Type    EC Carter Oliva PTA Physical Therapy Assistant                    Exercises       17 0800          Subjective Comments    Subjective Comments Pt reports HEP compliance  -EC      Subjective Pain    Able to rate subjective pain? yes  -EC      Pre-Treatment Pain Level 0  -EC      Exercise 1    Exercise Name 1 isometric shoulder flexiion holds against manual perturbations 120, 90, 60 degrees  -EC      Equipment 1 Dumbell  -EC      Weights/Plates 1 2  -EC      Sets 1 2  -EC      Reps 1 1   each  -EC      Time (Seconds) 1 30   each  -EC      Exercise 2    Exercise Name 2 Side-lying ER  manual perturbations   -EC      Cueing 2 Verbal  -EC      Reps 2 4  -EC      Time (Seconds) 2 30  -EC      Exercise 3    Exercise Name 3 serratus wall presses  -EC      Sets 3 2  -EC      Reps 3 20  -EC      Exercise 4    Exercise Name 4 BOSU rocking in plantargrade  -EC      Exercise 5    Exercise Name 5 modified planks with B pec nullification, knees shoulder width apart  -EC      Time (Seconds) 5 30  -EC        User Key  (r) = Recorded By, (t) = Taken By, (c) = Cosigned By    Initials Name Provider Type    EC Carter Oliva PTA Physical Therapy Assistant                        Manual Rx (last 36 hours)      Manual Treatments       07/06/17 0700          Manual Rx 1    Manual Rx 1 Location upper thoracic  -EC      Manual Rx 1 Type Prone extension mobilizations  -EC      Manual Rx 1 Grade 2-3  -EC      Manual Rx 1 Duration 10  -EC        User Key  (r) = Recorded By, (t) = Taken By, (c) = Cosigned By    Initials Name Provider Type    EC Carter Oliva PTA Physical Therapy Assistant                PT OP Goals       07/06/17 0801       PT Short Term Goals    STG Date to Achieve 07/06/17  -EC     STG 1 Patient will improve cross body adduction to the left eye with the scapula stabilized  -EC     STG 1 Progress Partially Met  -EC     STG 2 Patient will be knowledgeable of restrictions for ROM   -EC     STG 2 Progress Met  -EC     STG 3 Patient will demonstrate full (L) shoulder PROM in all planes with no significant pain at end range.  -EC     STG 3 Progress Met  -EC     STG 4 Patient will be able to hold a traditional plank for 60 seconds with no pain and maintaining proper plank posture (no sagging of the lumbar spine).  -EC     STG 4 Progress Ongoing  -EC     Long Term Goals    LTG Date to Achieve 07/27/17  -EC     LTG 1 Patient will be independent with a comprehensive HEP  -EC     LTG 1 Progress Partially Met  -EC     LTG 1 Progress Comments added serratus wall push today  -EC     LTG 2 Patient will  demonstrate AROM within 10 degrees of the right shoulder in all planes, demonstrating no superior glide during first half of the range.  -EC     LTG 2 Progress Met  -EC     LTG 3 Patient will demonstrate left rotator cuff strength to at least 4+/5 on MMT by discharge  -EC     LTG 3 Progress Met  -EC     LTG 4 Patient will score <10 on the Quick DASH indicating significant improvement in overall function of the left shoulder with ADL's by discharge.  -EC     LTG 4 Progress Progressing  -EC     LTG 5 Patient will report symptoms <2/10 with ADL's and leisure activities by discharge.  -EC     LTG 5 Progress Met  -EC     Time Calculation    PT Goal Re-Cert Due Date 07/15/17  -EC       User Key  (r) = Recorded By, (t) = Taken By, (c) = Cosigned By    Initials Name Provider Type    LATISHA Oliva PTA Physical Therapy Assistant                Therapy Education       07/06/17 0846          Therapy Education    Given HEP  -EC      Program New   serratus wall push  -EC      How Provided Verbal;Demonstration  -EC      Provided to Patient  -EC      Level of Understanding Verbalized;Demonstrated  -EC        User Key  (r) = Recorded By, (t) = Taken By, (c) = Cosigned By    Initials Name Provider Type    LATISHA Oliva PTA Physical Therapy Assistant                Time Calculation:   Start Time: 0800    Therapy Charges for Today     Code Description Service Date Service Provider Modifiers Qty    94943702387 HC PT MANUAL THERAPY EA 15 MIN 7/6/2017 Carter Oliva PTA GP 1    47697761281 HC PT THER PROC EA 15 MIN 7/6/2017 Carter Oliva PTA GP 2                    Carter Oliva PTA  7/6/2017

## 2017-07-10 ENCOUNTER — HOSPITAL ENCOUNTER (OUTPATIENT)
Dept: PHYSICAL THERAPY | Facility: HOSPITAL | Age: 72
Setting detail: THERAPIES SERIES
Discharge: HOME | End: 2017-07-10

## 2017-07-10 DIAGNOSIS — Z98.890 S/P ROTATOR CUFF REPAIR: Primary | ICD-10-CM

## 2017-07-10 DIAGNOSIS — M25.512 ACUTE PAIN OF LEFT SHOULDER: ICD-10-CM

## 2017-07-10 PROCEDURE — 97110 THERAPEUTIC EXERCISES: CPT

## 2017-07-10 NOTE — THERAPY TREATMENT NOTE
Outpatient Physical Therapy Ortho Treatment Note  [facility]     Patient Name: Michael Novak  : 1945  MRN: [id number]  Today's Date: 7/10/2017      Visit Date: 07/10/2017    Visit Dx:    ICD-10-CM ICD-9-CM   1. S/P rotator cuff repair Z98.890 V45.89   2. Acute pain of left shoulder M25.512 719.41       There is no problem list on file for this patient.       Past Medical History:   Diagnosis Date   • Back pain    • Coronary artery disease    • Diabetes mellitus    • Hearing deficit    • Hypertension         Past Surgical History:   Procedure Laterality Date   • ROTATOR CUFF REPAIR Left 03/15/2017                             PT Assessment/Plan       07/10/17 1111       PT Assessment    Functional Limitations Performance in work activities;Performance in leisure activities;Performance in self-care ADL  -RS     Assessment Comments His Quick Dash score increased slightly this date 27.27 as opposed to 25 on 16. However, he continues to make progress towards his few remaining goals, and he can tolerate faar more challenging therex activities.  -EC     PT Plan    PT Plan Comments During his next session progress his HEP from modified plank to standard planks. Continue the POC frequency once weekly for his remaining schedule for the next two weeks.  -EC       User Key  (r) = Recorded By, (t) = Taken By, (c) = Cosigned By    Initials Name Provider Type    EC Carter Oliva, PTA Physical Therapy Assistant    RS Mac Collins, PT DPT Physical Therapist                    Exercises       07/10/17 1000          Subjective Comments    Subjective Comments Pt reports he is not having much shoulder trouble little soreness   -EC      Subjective Pain    Able to rate subjective pain? yes  -EC      Pre-Treatment Pain Level 0  -EC      Exercise 1    Exercise Name 1 R sidelying L shoulder ER   -EC      Equipment 1 Dumbell  -EC      Weights/Plates 1 2  -EC      Reps 1 20  -EC      Exercise 2    Exercise  Name 2 prone L shoulder extension  -EC      Equipment 2 Dumbell  -EC      Weights/Plates 2 2  -EC      Reps 2 30   instructed him to perform 20  -EC      Exercise 3    Exercise Name 3 prone rows L UE  -EC      Equipment 3 Dumbell  -EC      Weights/Plates 3 2  -EC      Reps 3 30  -EC      Exercise 4    Exercise Name 4 BOSU rocking in plantargrade  -EC      Sets 4 2  -EC      Time (Minutes) 4 2  -EC      Exercise 5    Exercise Name 5 walkovers with BOSU  -EC      Time (Minutes) 5 2  -EC      Exercise 6    Exercise Name 6 Obtained an updated Quick Dash and reviewed remaining goals for recertification (see also goals section)  -EC      Exercise 7    Exercise Name 7 serratus wall pushes  -EC      Sets 7 2  -EC      Reps 7 20  -EC      Exercise 8    Exercise Name 8 planks  -EC      Reps 8 3  -EC      Time (Seconds) 8 30  -EC        User Key  (r) = Recorded By, (t) = Taken By, (c) = Cosigned By    Initials Name Provider Type    EC Carter Oliva, PTA Physical Therapy Assistant                               PT OP Goals       07/10/17 1027       PT Short Term Goals    STG Date to Achieve 07/06/17  -EC     STG 1 Patient will improve cross body adduction to the left eye with the scapula stabilized  -EC     STG 1 Progress Partially Met  -EC     STG 1 Progress Comments able to perform cross body no winging, or scapular substitiutions but L UE shaky at end range  -EC     STG 2 Patient will be knowledgeable of restrictions for ROM   -EC     STG 2 Progress Met  -EC     STG 3 Patient will demonstrate full (L) shoulder PROM in all planes with no significant pain at end range.  -EC     STG 3 Progress Met  -EC     STG 4 Patient will be able to hold a traditional plank for 60 seconds with no pain and maintaining proper plank posture (no sagging of the lumbar spine).  -EC     STG 4 Progress Ongoing  -EC     STG 4 Progress Comments no pain but only had him hold for 30 seconds today  -EC     Long Term Goals    LTG Date to Achieve  07/27/17  -EC     LTG 1 Patient will be independent with a comprehensive HEP  -EC     LTG 1 Progress Partially Met  -EC     LTG 2 Patient will demonstrate AROM within 10 degrees of the right shoulder in all planes, demonstrating no superior glide during first half of the range.  -EC     LTG 2 Progress Met  -EC     LTG 3 Patient will demonstrate left rotator cuff strength to at least 4+/5 on MMT by discharge  -EC     LTG 3 Progress Met  -EC     LTG 4 Patient will score <10 on the Quick DASH indicating significant improvement in overall function of the left shoulder with ADL's by discharge.  -EC     LTG 4 Progress Progressing  -EC     LTG 4 Progress Comments slight increase in score previous 25, today 27.27 20-39%  -EC     LTG 5 Patient will report symptoms <2/10 with ADL's and leisure activities by discharge.  -EC     LTG 5 Progress Met  -EC     Time Calculation    PT Goal Re-Cert Due Date 07/29/17  -EC       User Key  (r) = Recorded By, (t) = Taken By, (c) = Cosigned By    Initials Name Provider Type    LATISHA Oliva PTA Physical Therapy Assistant                Therapy Education       07/10/17 1111          Therapy Education    Given Symptoms/condition management  -EC      How Provided Verbal  -EC      Provided to Patient  -EC      Level of Understanding Verbalized;Demonstrated  -EC        User Key  (r) = Recorded By, (t) = Taken By, (c) = Cosigned By    Initials Name Provider Type    LATISHA Oliva PTA Physical Therapy Assistant                Outcome Measures       07/10/17 1100          Quick DASH    Open a tight or new jar. 2  -EC      Do heavy household chores (e.g., wash walls, wash floors) 2  -EC      Carry a shopping bag or briefcase 2  -EC      Wash your back 3  -EC      Use a knife to cut food 2  -EC      Recreational activities in which you take some force or impact through your arm, should or hand (e.g. golf, hammering, tennis, etc.) 3  -EC      During the past week, to what extent has your  arm, shoulder, or hand problem interfered with your normal social activites with family, friends, neighbors or groups? 2  -EC      During the past week, were you limited in your work or other regular daily activities as a result of your arm, shoulder or hand problem? 2  -EC      Arm, Shoulder, or hand pain 2  -EC      Tingling (pins and needles) in your arm, shoulder, or hand 1  -EC      During the past week, how much difficulty have you had sleeping because of the pain in your arm, shoulder or hand? 2  -EC      Number of Questions Answered 11  -EC      Quick DASH Score 27.27  -EC      Quick Dash Comments 20-39%  -EC      Functional Assessment    Outcome Measure Options Quick DASH  -EC        User Key  (r) = Recorded By, (t) = Taken By, (c) = Cosigned By    Initials Name Provider Type    EC Carter Oliva PTA Physical Therapy Assistant            Time Calculation:   Start Time: 1026  Stop Time: 1105  Time Calculation (min): 39 min  Total Timed Code Minutes- PT: 39 minute(s)    Therapy Charges for Today     Code Description Service Date Service Provider Modifiers Qty    87012534466 HC PT THER PROC EA 15 MIN 7/10/2017 Carter Oliva PTA GP 3          PT G-Codes  Outcome Measure Options: Quick DASH         Carter Oliva PTA  7/10/2017

## 2017-07-21 ENCOUNTER — HOSPITAL ENCOUNTER (OUTPATIENT)
Dept: PHYSICAL THERAPY | Facility: HOSPITAL | Age: 72
Setting detail: THERAPIES SERIES
Discharge: HOME | End: 2017-07-21

## 2017-07-21 DIAGNOSIS — M25.512 ACUTE PAIN OF LEFT SHOULDER: ICD-10-CM

## 2017-07-21 DIAGNOSIS — Z98.890 S/P ROTATOR CUFF REPAIR: Primary | ICD-10-CM

## 2017-07-21 PROCEDURE — 97110 THERAPEUTIC EXERCISES: CPT

## 2017-07-21 NOTE — THERAPY TREATMENT NOTE
Outpatient Physical Therapy Ortho Treatment Note  [facility]     Patient Name: Michael Novak  : 1945  MRN: [id number]  Today's Date: 2017      Visit Date: 2017    Visit Dx:    ICD-10-CM ICD-9-CM   1. S/P rotator cuff repair Z98.890 V45.89   2. Acute pain of left shoulder M25.512 719.41       There is no problem list on file for this patient.       Past Medical History:   Diagnosis Date   • Back pain    • Coronary artery disease    • Diabetes mellitus    • Hearing deficit    • Hypertension         Past Surgical History:   Procedure Laterality Date   • ROTATOR CUFF REPAIR Left 03/15/2017             PT Ortho       17 0900    Subjective Comments    Subjective Comments Patient reports he is feeling sore today, he attributes it to pushing his HEP too hard.  -RS    Subjective Pain    Subjective Pain Comment His R shoulder is at a 4/10, he also reports low back pain and bilateral calf pain.  -RS      User Key  (r) = Recorded By, (t) = Taken By, (c) = Cosigned By    Initials Name Provider Type    NAYAN Collins, PT DPT Physical Therapist                            PT Assessment/Plan       17 09       PT Assessment    Assessment Comments Patient demonstrated good contrl of the shoulder girdle while exercising in multiple planes, without an increase in symptoms. He reported that the tossing and catching exercise with weighted ball was good because he has a lot of that kind of motion at work and finds it challenging. He reports that now it is his R shoulder and calves that are his primary complaints, not his L shoulder anymore.   -RS     PT Plan    PT Plan Comments Continue to increase strength and control while moving through multiple planes and add in more power exercise to continue to functionally challenge his L shoulder.   -RS       User Key  (r) = Recorded By, (t) = Taken By, (c) = Cosigned By    Initials Name Provider Type    NAYAN Collins, PT DPT  Physical Therapist                    Exercises       07/21/17 0900          Subjective Comments    Subjective Comments Patient reports he is feeling sore today, he attributes it to pushing his HEP too hard.  -RS      Subjective Pain    Able to rate subjective pain? yes  -RS      Pre-Treatment Pain Level 2  -RS      Post-Treatment Pain Level 1  -RS      Subjective Pain Comment His R shoulder is at a 4/10, he also reports low back pain and bilateral calf pain.  -RS      Exercise 1    Exercise Name 1 R sidelying L shoulder in ER  -RS      Equipment 1 Dumbell  -RS      Weights/Plates 1 2  -RS      Reps 1 3  -RS      Time (Seconds) 1 45  -RS      Exercise 2    Exercise Name 2 Bear hugs in standing  -RS      Cueing 2 Verbal;Demo  -RS      Equipment 2 Pulley  -RS      Weights/Plates 2 3  -RS      Sets 2 3  -RS      Reps 2 15  -RS      Exercise 3    Exercise Name 3 L chops in standing  -RS      Equipment 3 Pulley  -RS      Weights/Plates 3 3  -RS      Sets 3 2  -RS      Reps 3 15  -RS      Exercise 4    Exercise Name 4 Concentric and eccentric toss and catch in R sidelying, L UE ER  -RS      Cueing 4 Verbal;Demo  -RS      Equipment 4 Other   2 pound medicine ball  -RS      Sets 4 2  -RS      Reps 4 15  -RS      Exercise 5    Exercise Name 5 Cold laser treatment to L shoulder   skin check showed no skin irritation  -RS      Time (Minutes) 5 5  -RS        User Key  (r) = Recorded By, (t) = Taken By, (c) = Cosigned By    Initials Name Provider Type    RS Mac Collins, PT DPT Physical Therapist                               PT OP Goals       07/21/17 0800       PT Short Term Goals    STG Date to Achieve 07/06/17  -RS     STG 1 Patient will improve cross body adduction to the left eye with the scapula stabilized  -RS     STG 1 Progress Partially Met  -RS     STG 2 Patient will be knowledgeable of restrictions for ROM   -RS     STG 2 Progress Met  -RS     STG 3 Patient will demonstrate full (L) shoulder PROM in all  planes with no significant pain at end range.  -RS     STG 3 Progress Met  -RS     STG 4 Patient will be able to hold a traditional plank for 60 seconds with no pain and maintaining proper plank posture (no sagging of the lumbar spine).  -RS     STG 4 Progress Ongoing  -RS     Long Term Goals    LTG Date to Achieve 07/27/17  -RS     LTG 1 Patient will be independent with a comprehensive HEP  -RS     LTG 1 Progress Partially Met  -RS     LTG 2 Patient will demonstrate AROM within 10 degrees of the right shoulder in all planes, demonstrating no superior glide during first half of the range.  -RS     LTG 2 Progress Met  -RS     LTG 3 Patient will demonstrate left rotator cuff strength to at least 4+/5 on MMT by discharge  -RS     LTG 3 Progress Met  -RS     LTG 4 Patient will score <10 on the Quick DASH indicating significant improvement in overall function of the left shoulder with ADL's by discharge.  -RS     LTG 4 Progress Progressing  -RS     LTG 5 Patient will report symptoms <2/10 with ADL's and leisure activities by discharge.  -RS     LTG 5 Progress Met  -RS     Time Calculation    PT Goal Re-Cert Due Date 07/29/17  -RS       User Key  (r) = Recorded By, (t) = Taken By, (c) = Cosigned By    Initials Name Provider Type    NAYAN Collins, PT DPT Physical Therapist                Therapy Education       07/21/17 0900          Therapy Education    Given Symptoms/condition management  -RS      Program Reinforced  -RS      How Provided Verbal  -RS      Provided to Patient  -RS      Level of Understanding Verbalized  -RS        User Key  (r) = Recorded By, (t) = Taken By, (c) = Cosigned By    Initials Name Provider Type    NAYAN Collins, PT DPT Physical Therapist                Time Calculation:   Start Time: 0800  Stop Time: 0845  Time Calculation (min): 45 min  PT Non-Billable Time (min): 7 min  Total Timed Code Minutes- PT: 38 minute(s)    Therapy Charges for Today     Code Description Service  Date Service Provider Modifiers Qty    82520702252 HC PT THER PROC EA 15 MIN 7/21/2017 Mac Collins, PT DPT GP 3                    ROSALVA Collins, PT DPT  7/21/2017

## 2017-07-26 ENCOUNTER — HOSPITAL ENCOUNTER (OUTPATIENT)
Dept: PHYSICAL THERAPY | Facility: HOSPITAL | Age: 72
Setting detail: THERAPIES SERIES
Discharge: HOME | End: 2017-07-26

## 2017-07-26 DIAGNOSIS — M25.512 ACUTE PAIN OF LEFT SHOULDER: ICD-10-CM

## 2017-07-26 DIAGNOSIS — Z98.890 S/P ROTATOR CUFF REPAIR: Primary | ICD-10-CM

## 2017-07-26 PROCEDURE — G8984 CARRY CURRENT STATUS: HCPCS

## 2017-07-26 PROCEDURE — 97110 THERAPEUTIC EXERCISES: CPT

## 2017-07-26 PROCEDURE — G8985 CARRY GOAL STATUS: HCPCS

## 2017-07-26 NOTE — THERAPY PROGRESS REPORT/RE-CERT
Outpatient Physical Therapy Ortho Progress Note/Recert   [place]     Patient Name: Michael Novak  : 1945  MRN: [id number]  Today's Date: 2017      Visit Date: 2017    Visit Dx:    ICD-10-CM ICD-9-CM   1. S/P rotator cuff repair Z98.890 V45.89   2. Acute pain of left shoulder M25.512 719.41       There is no problem list on file for this patient.       Past Medical History:   Diagnosis Date   • Back pain    • Coronary artery disease    • Diabetes mellitus    • Hearing deficit    • Hypertension         Past Surgical History:   Procedure Laterality Date   • ROTATOR CUFF REPAIR Left 03/15/2017                             PT Assessment/Plan       17 0800       PT Assessment    Functional Limitations Performance in work activities;Performance in leisure activities;Performance in self-care ADL  -RS     Impairments Joint mobility;Muscle strength;Pain;Posture;Range of motion  -RS     Assessment Comments Patient is progressing toward all goals at this time.  The Quick DASH score has also improved at this time.  The shoulder rotator cuff/deltoid force couple is improving in strength.  Scapular strength is also improving especially into upward rotation.  We have decreased the plan of care to 1x/week and we will continue with this as we work toward discharge.  Patient is in agreement with these recommendations.  Thank you for allowing us to work with this patient.  -RS     Please refer to paper survey for additional self-reported information Yes  -RS     Rehab Potential Good  -RS     Patient/caregiver participated in establishment of treatment plan and goals Yes  -RS     Patient would benefit from skilled therapy intervention Yes  -RS     PT Plan    PT Frequency 1x/week  -RS     Predicted Duration of Therapy Intervention (days/wks) 4-6 weeks  -RS     Planned CPT's? PT THER PROC EA 15 MIN: 86504;PT MANUAL THERAPY EA 15 MIN: 51413;PT NEUROMUSC RE-EDUCATION EA 15 MIN: 18890;PT HOT OR COLD  "PACK TREAT MCARE;PT ELECTRICAL STIM UNATTEND: ;PT ELECTRICAL STIM ATTD EA 15 MIN: 40329  -RS     Physical Therapy Interventions (Optional Details) home exercise program;manual therapy techniques;modalities;neuromuscular re-education;patient/family education;postural re-education;strengthening;stretching;swiss ball techniques;taping  -RS     PT Plan Comments We will continue to progress the HEP working toward discharge.  Focus on strengthening through functional movement patterns.  -RS       User Key  (r) = Recorded By, (t) = Taken By, (c) = Cosigned By    Initials Name Provider Type    RS Mac Collins, PT DPT Physical Therapist                    Exercises       07/26/17 0800          Subjective Comments    Subjective Comments The shoulder feels fatigued today overall.  He thinks that he may have over worked his shoulder at home  -RS      Subjective Pain    Able to rate subjective pain? yes  -RS      Pre-Treatment Pain Level 0  -RS      Post-Treatment Pain Level 0  -RS      Exercise 1    Additional Comments assessed all goals and QuickDash for recertification.  See goals section for details.    -RS      Exercise 2    Exercise Name 2 standing high/low (L) PNF chop  -RS      Cueing 2 Demo  -RS      Sets 2 4  -RS      Reps 2 10  -RS      Additional Comments 20# on CYBEX  -RS      Exercise 3    Exercise Name 3 supine low to high PNF chop (L)  -RS      Cueing 3 Demo  -RS      Sets 3 4  -RS      Reps 3 10  -RS      Additional Comments red theraband (added to the HEP)  -RS      Exercise 4    Exercise Name 4 prone place and hold low trap perturbation  -RS      Cueing 4 Demo  -RS      Sets 4 3  -RS      Reps 4 15  -RS      Additional Comments no weight  -RS      Exercise 5    Exercise Name 5 prone mid trap \"ER\" with wand  -RS      Cueing 5 Demo  -RS      Sets 5 4  -RS      Time (Seconds) 5 30  -RS      Additional Comments MMT test position  -RS      Resistance 5 Other (comment)   1# therabar  -RS        User " Key  (r) = Recorded By, (t) = Taken By, (c) = Cosigned By    Initials Name Provider Type    RS Mac Collins, PT DPT Physical Therapist                               PT OP Goals       07/26/17 0800       PT Short Term Goals    STG Date to Achieve 07/06/17  -RS     STG 1 Patient will improve cross body adduction to the left eye with the scapula stabilized  -RS     STG 1 Progress Partially Met  -RS     STG 2 Patient will be knowledgeable of restrictions for ROM   -RS     STG 2 Progress Met  -RS     STG 3 Patient will demonstrate full (L) shoulder PROM in all planes with no significant pain at end range.  -RS     STG 3 Progress Met  -RS     STG 4 Patient will be able to hold a traditional plank for 60 seconds with no pain and maintaining proper plank posture (no sagging of the lumbar spine).  -RS     STG 4 Progress Ongoing  -RS     STG 4 Progress Comments <30 sec  -RS     Long Term Goals    LTG Date to Achieve 07/27/17  -RS     LTG 1 Patient will be independent with a comprehensive HEP  -RS     LTG 1 Progress Partially Met  -RS     LTG 2 Patient will demonstrate AROM within 10 degrees of the right shoulder in all planes, demonstrating no superior glide during first half of the range.  -RS     LTG 2 Progress Met  -RS     LTG 3 Patient will demonstrate left rotator cuff strength to at least 4+/5 on MMT by discharge  -RS     LTG 3 Progress Met  -RS     LTG 4 Patient will score <10 on the Quick DASH indicating significant improvement in overall function of the left shoulder with ADL's by discharge.  -RS     LTG 4 Progress Progressing  -RS     LTG 4 Progress Comments 22.73  -RS     LTG 5 Patient will report symptoms <2/10 with ADL's and leisure activities by discharge.  -RS     LTG 5 Progress Met  -RS     Time Calculation    PT Goal Re-Cert Due Date 08/25/17  -RS       User Key  (r) = Recorded By, (t) = Taken By, (c) = Cosigned By    Initials Name Provider Type    NAYAN Collins, PT DPT Physical Therapist                 Therapy Education       07/26/17 0800          Therapy Education    Education Details Added supine PNF diagonal chop with red theraband (L) shoulder low to high  -RS      Given HEP  -RS      Program Progressed  -RS      How Provided Demonstration;Verbal  -RS      Provided to Patient  -RS      Level of Understanding Demonstrated;Verbalized  -RS        User Key  (r) = Recorded By, (t) = Taken By, (c) = Cosigned By    Initials Name Provider Type    RS Mac Collins, PT DPT Physical Therapist                Outcome Measures       07/26/17 0800          Quick DASH    Open a tight or new jar. 2  -RS      Do heavy household chores (e.g., wash walls, wash floors) 2  -RS      Carry a shopping bag or briefcase 1  -RS      Wash your back 3  -RS      Use a knife to cut food 1  -RS      Recreational activities in which you take some force or impact through your arm, should or hand (e.g. golf, hammering, tennis, etc.) 2  -RS      During the past week, to what extent has your arm, shoulder, or hand problem interfered with your normal social activites with family, friends, neighbors or groups? 2  -RS      During the past week, were you limited in your work or other regular daily activities as a result of your arm, shoulder or hand problem? 3  -RS      Arm, Shoulder, or hand pain 2  -RS      Tingling (pins and needles) in your arm, shoulder, or hand 1  -RS      During the past week, how much difficulty have you had sleeping because of the pain in your arm, shoulder or hand? 2  -RS      Number of Questions Answered 11  -RS      Quick DASH Score 22.73  -RS      Quick Dash Comments 20-39%  -RS      Functional Assessment    Outcome Measure Options Quick DASH  -RS        User Key  (r) = Recorded By, (t) = Taken By, (c) = Cosigned By    Initials Name Provider Type    RS Mac Collins, PT DPT Physical Therapist            Time Calculation:   Start Time: 0800  Stop Time: 0845  Time Calculation (min): 45  min  PT Non-Billable Time (min): 5 min  Total Timed Code Minutes- PT: 40 minute(s)    Therapy Charges for Today     Code Description Service Date Service Provider Modifiers Qty    83536805173 HC PT CARRY MOV HAND OBJ CURRENT 7/26/2017 Mac Collins, PT DPT GP, CJ 1    10877860733 HC PT CARRY MOV HAND OBJ PROJECTED 7/26/2017 Mac Collins, PT DPT GP, CI 1    06108743841 HC PT THER PROC EA 15 MIN 7/26/2017 Mac Collins, PT DPT GP 3          PT G-Codes  Outcome Measure Options: Quick DASH  Score: 22.73  Functional Limitation: Carrying, moving and handling objects  Carrying, Moving and Handling Objects Current Status (): At least 20 percent but less than 40 percent impaired, limited or restricted  Carrying, Moving and Handling Objects Goal Status (): At least 1 percent but less than 20 percent impaired, limited or restricted         ROSALVA Collins, PT DPT  7/26/2017

## 2017-08-30 ENCOUNTER — DOCUMENTATION (OUTPATIENT)
Dept: PHYSICAL THERAPY | Facility: HOSPITAL | Age: 72
End: 2017-08-30

## 2017-08-30 DIAGNOSIS — Z98.890 S/P ROTATOR CUFF REPAIR: Primary | ICD-10-CM

## 2017-08-30 DIAGNOSIS — M25.512 ACUTE PAIN OF LEFT SHOULDER: ICD-10-CM

## 2017-08-30 NOTE — THERAPY DISCHARGE NOTE
Outpatient Physical Therapy Discharge Summary         Patient Name: Michael Novak  : 1945  MRN: [id number]    Today's Date: 2017    Visit Dx:    ICD-10-CM ICD-9-CM   1. S/P rotator cuff repair Z98.890 V45.89   2. Acute pain of left shoulder M25.512 719.41             PT OP Goals       17 0800       PT Short Term Goals    STG Date to Achieve 17  -RS     STG 1 Patient will improve cross body adduction to the left eye with the scapula stabilized  -RS     STG 1 Progress Partially Met  -RS     STG 2 Patient will be knowledgeable of restrictions for ROM   -RS     STG 2 Progress Met  -RS     STG 3 Patient will demonstrate full (L) shoulder PROM in all planes with no significant pain at end range.  -RS     STG 3 Progress Met  -RS     STG 4 Patient will be able to hold a traditional plank for 60 seconds with no pain and maintaining proper plank posture (no sagging of the lumbar spine).  -RS     STG 4 Progress Not Met  -RS     Long Term Goals    LTG Date to Achieve 17  -RS     LTG 1 Patient will be independent with a comprehensive HEP  -RS     LTG 1 Progress Partially Met  -RS     LTG 2 Patient will demonstrate AROM within 10 degrees of the right shoulder in all planes, demonstrating no superior glide during first half of the range.  -RS     LTG 2 Progress Met  -RS     LTG 3 Patient will demonstrate left rotator cuff strength to at least 4+/5 on MMT by discharge  -RS     LTG 3 Progress Met  -RS     LTG 4 Patient will score <10 on the Quick DASH indicating significant improvement in overall function of the left shoulder with ADL's by discharge.  -RS     LTG 4 Progress Not Met  -RS     LTG 5 Patient will report symptoms <2/10 with ADL's and leisure activities by discharge.  -RS     LTG 5 Progress Met  -RS       User Key  (r) = Recorded By, (t) = Taken By, (c) = Cosigned By    Initials Name Provider Type    NAYAN Collins, PT DPT Physical Therapist          OP PT Discharge  Summary  Date of Discharge: 08/30/17  Reason for Discharge: Unable to pay/insurance denied care  Outcomes Achieved: Refer to plan of care for updates on goals achieved  Discharge Destination: Home with home program  Discharge Instructions: Insurance denied our last request for 1x week for 6 weeks.  Patient was independenet with the HEP and general function at the time of discharge.  Patient did not call with any concerns over the past 4 weeks.      Time Calculation:                    ROSALVA Collins, PT DPT  8/30/2017

## 2017-10-18 ENCOUNTER — OFFICE VISIT (OUTPATIENT)
Dept: UROLOGY | Age: 72
End: 2017-10-18
Payer: MEDICARE

## 2017-10-18 VITALS
DIASTOLIC BLOOD PRESSURE: 74 MMHG | BODY MASS INDEX: 35.17 KG/M2 | HEIGHT: 64 IN | WEIGHT: 206 LBS | OXYGEN SATURATION: 98 % | HEART RATE: 87 BPM | SYSTOLIC BLOOD PRESSURE: 136 MMHG | TEMPERATURE: 98.2 F

## 2017-10-18 DIAGNOSIS — R39.89 ABNORMAL PROSTATE EXAM: ICD-10-CM

## 2017-10-18 DIAGNOSIS — R97.20 ELEVATED PSA: Primary | ICD-10-CM

## 2017-10-18 LAB
BILIRUBIN, POC: NORMAL
BLOOD URINE, POC: NORMAL
CLARITY, POC: CLEAR
COLOR, POC: YELLOW
GLUCOSE URINE, POC: NORMAL
KETONES, POC: NORMAL
LEUKOCYTE EST, POC: NORMAL
NITRITE, POC: NORMAL
PH, POC: 5
PROTEIN, POC: NORMAL
SPECIFIC GRAVITY, POC: 1.02
UROBILINOGEN, POC: 0.2

## 2017-10-18 PROCEDURE — 81003 URINALYSIS AUTO W/O SCOPE: CPT | Performed by: PHYSICIAN ASSISTANT

## 2017-10-18 PROCEDURE — 99202 OFFICE O/P NEW SF 15 MIN: CPT | Performed by: PHYSICIAN ASSISTANT

## 2017-10-18 RX ORDER — CIPROFLOXACIN 500 MG/1
500 TABLET, FILM COATED ORAL 2 TIMES DAILY
Qty: 8 TABLET | Refills: 0 | Status: SHIPPED | OUTPATIENT
Start: 2017-10-18 | End: 2017-10-22

## 2017-10-18 ASSESSMENT — ENCOUNTER SYMPTOMS
NAUSEA: 0
HEARTBURN: 0
SORE THROAT: 0
BLURRED VISION: 0
SHORTNESS OF BREATH: 0
WHEEZING: 0
DOUBLE VISION: 0

## 2017-10-18 NOTE — PROGRESS NOTES
Ever Grace is a 67 y.o. male who presents today   Chief Complaint   Patient presents with    New Patient     I was referred here by Kana Lovell for elevated PSA     Elevated PSA  Patient is here with an elevated PSA. He has no personal history and no family history of prostate cancer. His AUA Symptom Score is 19/35, manifested as irritative symptoms including frequency, urgency and obstructive symptoms including weak stream. He has no prior genitourinary history of hematuria, hematospermia, prostatitis, urolithiasis, epididymal orchitis, previous  surgery. Previous PSA values are :  PSA:    7.3     10/10/2017. No results found for: PSA      Past Medical History:   Diagnosis Date    CAD (coronary artery disease)     Chronic back pain     Diverticulitis     Hyperlipidemia     Hypertension     Knee pain     Shoulder pain     Type II or unspecified type diabetes mellitus without mention of complication, not stated as uncontrolled        Past Surgical History:   Procedure Laterality Date    CORONARY ARTERY BYPASS GRAFT      SHOULDER ARTHROSCOPY Left 3/16/2017    SHOULDER ARTHROSCOPIC SUBACROMIAL DECOMPRESSION, DISTAL CLAVICLE RESECTION, GLENUHUMERAL SYNOVECTOMY, OPEN ROTATOR CUFF REPAIR AND OPEN BICEPS TENODESIS LEFT  performed by [facility] DO at [facility] OR    TONSILLECTOMY      age 9       Current Outpatient Prescriptions   Medication Sig Dispense Refill    ciprofloxacin (CIPRO) 500 MG tablet Take 1 tablet by mouth 2 times daily for 4 days 8 tablet 0    Aspirin Buf,ZuHph-EnRke-UkNey, (BUFFERED ASPIRIN) 325 MG TABS Take 1 tablet by mouth once      glyBURIDE (DIABETA) 5 MG tablet Take 5 mg by mouth 2 times daily (with meals)      losartan (COZAAR) 50 MG tablet Take 12.5 mg by mouth daily      atorvastatin (LIPITOR) 20 MG tablet Take 20 mg by mouth daily      Multiple Vitamins-Minerals (MULTIVITAMIN PO) Take  by mouth daily.       metFORMIN (GLUCOPHAGE) 500 MG tablet Take 500 mg by mouth 2 times daily (with meals). No current facility-administered medications for this visit. Allergies   Allergen Reactions    Valium [Diazepam] Nausea Only       Social History     Social History    Marital status:      Spouse name: N/A    Number of children: N/A    Years of education: N/A     Social History Main Topics    Smoking status: Former Smoker    Smokeless tobacco: Never Used    Alcohol use No    Drug use: Unknown    Sexual activity: Not Asked     Other Topics Concern    None     Social History Narrative    None       Family History   Problem Relation Age of Onset    Cancer Mother     Heart Disease Father        REVIEW OF SYSTEMS:  Review of Systems   Constitutional: Negative for chills and fever. HENT: Negative for congestion and sore throat. Eyes: Negative for blurred vision and double vision. Respiratory: Negative for shortness of breath and wheezing. Cardiovascular: Negative for chest pain and palpitations. Gastrointestinal: Negative for heartburn and nausea. Genitourinary: Negative for dysuria, flank pain, frequency, hematuria and urgency. Takes a little longer to get started   Musculoskeletal: Negative for falls and neck pain. Skin: Negative for itching and rash. Neurological: Negative for dizziness and tingling. Endo/Heme/Allergies: Does not bruise/bleed easily. Psychiatric/Behavioral: The patient does not have insomnia. PHYSICAL EXAM:  /74   Pulse 87   Temp 98.2 °F (36.8 °C) (Temporal)   Ht 5' 4\" (1.626 m)   Wt 206 lb (93.4 kg)   SpO2 98%   BMI 35.36 kg/m²   Physical Exam   Constitutional: No distress. HENT:   Mouth/Throat: No oropharyngeal exudate. Eyes: Left eye exhibits no discharge. No scleral icterus. Neck: No JVD present. No tracheal deviation present. No thyromegaly present. Cardiovascular: Exam reveals no gallop and no friction rub. Pulmonary/Chest: No stridor. He has no rales.

## 2017-10-31 ENCOUNTER — TELEPHONE (OUTPATIENT)
Dept: UROLOGY | Age: 72
End: 2017-10-31

## 2017-10-31 NOTE — TELEPHONE ENCOUNTER
pt has procedure on 11/03 and thought that medication was supposed to be sent to the [facility] in [place] and they do not have anything, please call pt to discuss

## 2017-11-01 ENCOUNTER — TELEPHONE (OUTPATIENT)
Dept: GASTROENTEROLOGY | Age: 72
End: 2017-11-01

## 2017-11-01 NOTE — TELEPHONE ENCOUNTER
Lennis Epley saw this pt in 2014 and scheudled him for EGD/colon. I dont see op notes for these. Were they ever done?

## 2017-11-02 ENCOUNTER — OFFICE VISIT (OUTPATIENT)
Dept: GASTROENTEROLOGY | Age: 72
End: 2017-11-02
Payer: MEDICARE

## 2017-11-02 VITALS
HEART RATE: 62 BPM | DIASTOLIC BLOOD PRESSURE: 70 MMHG | HEIGHT: 66 IN | WEIGHT: 206.8 LBS | BODY MASS INDEX: 33.23 KG/M2 | OXYGEN SATURATION: 98 % | SYSTOLIC BLOOD PRESSURE: 130 MMHG

## 2017-11-02 DIAGNOSIS — R12 CHRONIC HEARTBURN: ICD-10-CM

## 2017-11-02 DIAGNOSIS — D64.9 ANEMIA, UNSPECIFIED TYPE: Primary | ICD-10-CM

## 2017-11-02 PROCEDURE — 99214 OFFICE O/P EST MOD 30 MIN: CPT | Performed by: NURSE PRACTITIONER

## 2017-11-02 RX ORDER — OMEPRAZOLE 20 MG/1
20 CAPSULE, DELAYED RELEASE ORAL DAILY
COMMUNITY

## 2017-11-02 ASSESSMENT — ENCOUNTER SYMPTOMS
SORE THROAT: 0
SHORTNESS OF BREATH: 0
DIARRHEA: 0
TROUBLE SWALLOWING: 0
VOMITING: 0
BLOOD IN STOOL: 0
ANAL BLEEDING: 0
BACK PAIN: 1
CONSTIPATION: 0
RECTAL PAIN: 0
NAUSEA: 0
COUGH: 0
VOICE CHANGE: 0
ABDOMINAL DISTENTION: 0

## 2017-11-02 NOTE — PROGRESS NOTES
Subjective:      Patient ID: Priya Mejia is a 67 y.o. male. Chief Complaint   Patient presents with    Anemia     referred by Dr. John Gil Gastroesophageal Reflux       HPI  PCP: Sammy Meek  Pt is referred here for c/o anemia. Pt was evaluated in this office in 2014 and EGD/colonoscopy was scheduled at that time(screening colon and EGD for c/o dysphagia)  however he never did not proceed with them. He denies bright red rectal bleeding, dark stools, hematemesis. Also denies abdominal pain other than some pain he has in his RUQ that started in his back, now radiating into RUQ, has an appt with chiropractor today for this. Feels is muscular pain, it is deep and aching and worse with movement. Has chronic heartburn well controlled with prilosec daily. No other UGI complaints. Labs 10/2017:  H/H 12.1/37.1  MCV normal  MCHC low at 32.6  Iron normal  B12 low at 198 (>239)  Folate normal    Family HX:  Pt denies family hx of colon polyps, colon CA, inflammatory bowel dx, gastric CA and esophageal CA.       Past Medical History:   Diagnosis Date    CAD (coronary artery disease)     Chronic back pain     Diverticulitis     Hyperlipidemia     Hypertension     Knee pain     Shoulder pain     Type II or unspecified type diabetes mellitus without mention of complication, not stated as uncontrolled      Past Surgical History:   Procedure Laterality Date    CORONARY ARTERY BYPASS GRAFT      SHOULDER ARTHROSCOPY Left 3/16/2017    SHOULDER ARTHROSCOPIC SUBACROMIAL DECOMPRESSION, DISTAL CLAVICLE RESECTION, GLENUHUMERAL SYNOVECTOMY, OPEN ROTATOR CUFF REPAIR AND OPEN BICEPS TENODESIS LEFT  performed by Rima Chen DO at [facility] OR    TONSILLECTOMY      age 9     Social History     Social History    Marital status:      Spouse name: N/A    Number of children: N/A    Years of education: N/A     Social History Main Topics    Smoking status: Former Smoker    Smokeless tobacco: Never Used    Alcohol use No    Drug use: No    Sexual activity: Not Asked     Other Topics Concern    None     Social History Narrative    None     Allergies   Allergen Reactions    Valium [Diazepam] Nausea Only     Current Outpatient Prescriptions   Medication Sig Dispense Refill    omeprazole (PRILOSEC) 20 MG delayed release capsule Take 20 mg by mouth daily      Aspirin Buf,FmOcu-DtRoh-LcCrp, (BUFFERED ASPIRIN) 325 MG TABS Take 1 tablet by mouth once      glyBURIDE (DIABETA) 5 MG tablet Take 5 mg by mouth 2 times daily (with meals)      losartan (COZAAR) 50 MG tablet Take 12.5 mg by mouth daily      atorvastatin (LIPITOR) 20 MG tablet Take 20 mg by mouth daily      Multiple Vitamins-Minerals (MULTIVITAMIN PO) Take  by mouth daily.  metFORMIN (GLUCOPHAGE) 500 MG tablet Take 500 mg by mouth 2 times daily (with meals). No current facility-administered medications for this visit. Review of Systems   Constitutional: Negative for appetite change, fatigue, fever and unexpected weight change. HENT: Negative for sore throat, trouble swallowing and voice change. Respiratory: Negative for cough and shortness of breath. Cardiovascular: Negative for chest pain, palpitations and leg swelling. Gastrointestinal: Negative for abdominal distention, anal bleeding, blood in stool, constipation, diarrhea, nausea, rectal pain and vomiting. Genitourinary: Negative for hematuria. Musculoskeletal: Positive for back pain. Negative for arthralgias and neck pain. Neurological: Negative for dizziness, weakness, light-headedness and headaches. Psychiatric/Behavioral: Negative for dysphoric mood and sleep disturbance. The patient is not nervous/anxious. All other systems reviewed and are negative. Objective:   Physical Exam   Constitutional: He is oriented to person, place, and time. He appears well-developed and well-nourished.    /70   Pulse 62   Ht 5' 6\" (1.676 m)   Wt 206 lb 12.8 oz (93.8 kg)   SpO2 98%   BMI 33.38 kg/m²    Eyes: Conjunctivae and EOM are normal. Pupils are equal, round, and reactive to light. No scleral icterus. Neck: Normal range of motion. Neck supple. No thyromegaly present. Cardiovascular: Normal rate, regular rhythm and normal heart sounds. Exam reveals no gallop and no friction rub. No murmur heard. Pulmonary/Chest: Effort normal and breath sounds normal. No respiratory distress. Abdominal: Soft. Bowel sounds are normal. He exhibits no distension. There is no tenderness. There is no rebound. Musculoskeletal: Normal range of motion. He exhibits no edema or deformity. Neurological: He is alert and oriented to person, place, and time. No cranial nerve deficit. Skin: No pallor. Psychiatric: He has a normal mood and affect. Judgment normal.   Nursing note and vitals reviewed. Assessment:      1. Anemia, unspecified type  COLONOSCOPY W/ OR W/O BIOPSY    ESOPHAGOSCOPY / EGD   2. Chronic heartburn  ESOPHAGOSCOPY / EGD           Plan:      1. Schedule outpatient colonoscopy. Patient advised no Aspirin, Fish Oil, Vit E or NSAIDs 5 (five) days before procedure. Follow-up Visit: per Dr Luis Mckeon  Pt education:  Risks, benefits, and alternatives to colonoscopy were discussed. Risks of colonoscopy include, but are not limited to, perforation, bleeding, and infection. We discussed that the risk for perforation is 1-3 in 5,000  at the time of colonoscopy;   and 1-2% risk of bleeding post-polypectomy. All questions answered to the satisfaction of the patient. Pt is agreeable to proceed. 2. Schedule outpatient endoscopy--please get bx to r/o sprue. Patient advised no Aspirin, Fish Oil, Vit E or NSAIDs 5 (five) days before procedure. Follow-up Visit: per   Pt Education:   Risks, benefits, and alternatives to endoscopy were discussed. Patient voices understanding of risks of, but not limited to, perforation, bleeding, and infection.  The risk

## 2017-11-02 NOTE — PATIENT INSTRUCTIONS
exam. It is recommended to consider certain changes to your diet three to four days prior to the procedure. Remember that your bowels need to be empty for the exam.    What foods are good to eat? Cut down on heavy solid foods three to four days before the procedure and start introducing lighter meals to your diet. The following food suggestions are a good part of your diet before a colonoscopy bowel preparation. Light meat that is easily digestible such as chicken (without the skin)   Potatoes without skin   Cheese   Eggs   A light meal of steamed white fish   Light clear soups    Foods and drinks to avoid  Avoid foods that contain too much fiber. Stay clear of dark colored beverages. They can stick to the walls of the digestive tract and make it difficult to differentiate from blood. Some of these foods are:  Red meat, rice, nuts and vegetables   Milk, other milk based fluids and cream   Most fruit and puddings   Whole grain pasta   Cereals, bran and seeds   Colored beverages, especially those that are red or purple in color   Red colored Jell-O   On the day before the colonoscopy, continue to drink plenty of clear fluids. It is important   to keep yourself hydrated before the exam.     Please follow all instructions as provided for cleansing the bowel. Failure to have an adequately prepped colon may cause you to have incomplete exam with further testing required. You are going to have an Endoscopy and here are some basic instructions:    Nothing to eat or drink after midnight EXCEPT:  PLEASE TAKE MEDICATION(S) FOR HIGH BLOOD PRESSURE, SEIZURES, HEART, AND THYROID WITH A SIP OF WATER AT LEAST 2 HOURS PRIOR TO ARRIVAL TIME.   YOU MAY ALSO TAKE ANY INHALERS YOU ARE PRESCRIBED. You will not be able to drive for 24 hours after the procedure due to sedation. Bring a  with you the day of the procedure. No aspirin, ibuprofen, naproxen, fish oil or vitamin E for 5 days before procedure.    Continue current medications. If you are on blood thinners, clearance from the prescribing physician will be obtained before your procedure is scheduled. Increased risks may be associated with discontinuation of your blood thinner and include, but not limited to, stroke, TIA, or cardiac event. If biopsies are taken during the procedure they will be sent to a pathologist for analysis. You will be notified by mail of the pathology results in 2-3 weeks. Your physician may also schedule a follow up appointment with the nurse practitioner to discuss pathology, symptoms or to check if you have had any problems related to your procedure. If you prefer not to return to the office after your procedure please discuss this with your physician on the day of your procedure.

## 2017-11-03 ENCOUNTER — PROCEDURE VISIT (OUTPATIENT)
Dept: UROLOGY | Age: 72
End: 2017-11-03
Payer: MEDICARE

## 2017-11-03 ENCOUNTER — HOSPITAL ENCOUNTER (OUTPATIENT)
Age: 72
Setting detail: SPECIMEN
Discharge: HOME | End: 2017-11-03
Payer: MEDICARE

## 2017-11-03 VITALS — WEIGHT: 206 LBS | BODY MASS INDEX: 33.25 KG/M2

## 2017-11-03 DIAGNOSIS — R97.20 ELEVATED PSA: Primary | ICD-10-CM

## 2017-11-03 PROCEDURE — 99999 PR OFFICE/OUTPT VISIT,PROCEDURE ONLY: CPT | Performed by: UROLOGY

## 2017-11-03 PROCEDURE — 96372 THER/PROPH/DIAG INJ SC/IM: CPT | Performed by: UROLOGY

## 2017-11-03 PROCEDURE — 55700 PR BIOPSY OF PROSTATE,NEEDLE/PUNCH: CPT | Performed by: UROLOGY

## 2017-11-03 PROCEDURE — 88305 TISSUE EXAM BY PATHOLOGIST: CPT

## 2017-11-03 PROCEDURE — 76942 ECHO GUIDE FOR BIOPSY: CPT | Performed by: UROLOGY

## 2017-11-03 RX ORDER — GENTAMICIN SULFATE 40 MG/ML
80 INJECTION, SOLUTION INTRAMUSCULAR; INTRAVENOUS ONCE
Status: COMPLETED | OUTPATIENT
Start: 2017-11-03 | End: 2017-11-03

## 2017-11-03 RX ORDER — CIPROFLOXACIN 500 MG/1
500 TABLET, FILM COATED ORAL 2 TIMES DAILY
COMMUNITY
End: 2017-12-01

## 2017-11-03 RX ADMIN — GENTAMICIN SULFATE 80 MG: 40 INJECTION, SOLUTION INTRAMUSCULAR; INTRAVENOUS at 07:54

## 2017-11-03 NOTE — PROGRESS NOTES
Joe Lewis is a 67 y.o. male who presents today   Chief Complaint   Patient presents with    Follow-up     I'm here for my BX for elevated PSA       Elevated PSA  Patient is here with an elevated PSA. He has no personal history and no family history of prostate cancer. His AUA Symptom Score is 19/35, manifested as irritative symptoms including frequency, urgency and obstructive symptoms including weak stream. He has no prior genitourinary history of hematuria, hematospermia, prostatitis, urolithiasis, epididymal orchitis, previous  surgery. Previous PSA values are :  PSA:    7.3     10/10/2017. Patient was also found to have a nodule in the right side on VIKI    PROSTATE U/S AND BIOPSY  As per my instructions, the patient took an antibiotic Beginning 1 day prior to this procedure. To be continued daily until 2 days post biopsy. Gentamicin 80mg IM was given today. He also had a Fleets enema. Surgeon: Birdie Milner MD  11/3/17  Indications for exam: Elevated PSA and abnormal VIKI right side nodule. I did a VIKI myself prior to the ultrasound and he has soreness soft almost exophytic type nodule right apex laterally he wasn't real firm or indurated  Anesthesia: 1% Lidocaine periprostatic block bilaterally at junction of base of prostate and seminal vesicle, and apex   Ultrasound Findings:  Seminal Vesicles: intact bilaterally  Prostate Measurement: 48.4 grams  Transitional Zone: Normal echogenic structure, there were some. He urethral cast complications. Peripheral Zone: Normal echogenic structure  There is nodule pretty well demarcated in the right apex right lateral apex the area of abnormal VIKI this was hypoechoic compared to the adjacent tissue the very discrete  Bladder: Minimal postvoid residual  Biopsy: Trans Rectal Ultra Sound was used for Needle Guidance to direct the location of the needle for biopsy.  Biopsy cores were taken from the left and right  lobe in a sequential fashion

## 2017-11-10 ENCOUNTER — TELEPHONE (OUTPATIENT)
Dept: UROLOGY | Age: 72
End: 2017-11-10

## 2017-11-10 DIAGNOSIS — C61 PROSTATE CANCER (HCC): Primary | ICD-10-CM

## 2017-11-10 NOTE — TELEPHONE ENCOUNTER
Patient was called with the results of his prostate biopsy done on November 3. This was positive for prostate cancer in fact he had some cores showing high grade [place] 4+4 = 8. This does correspond with a nodule felt on the right side as well. Since he does have high-grade disease in be scheduled for CT scan of the abdomen pelvis and bone scan and chest x-ray and a CMP.  He'll see me for follow-up with a cancer consult

## 2017-11-20 ENCOUNTER — HOSPITAL ENCOUNTER (OUTPATIENT)
Dept: NUCLEAR MEDICINE | Age: 72
Discharge: HOME | End: 2017-11-20
Payer: MEDICARE

## 2017-11-20 ENCOUNTER — HOSPITAL ENCOUNTER (OUTPATIENT)
Dept: CT IMAGING | Age: 72
Discharge: HOME | End: 2017-11-20
Payer: MEDICARE

## 2017-11-20 ENCOUNTER — HOSPITAL ENCOUNTER (OUTPATIENT)
Dept: GENERAL RADIOLOGY | Age: 72
Discharge: HOME | End: 2017-11-20
Payer: MEDICARE

## 2017-11-20 DIAGNOSIS — C61 PROSTATE CANCER (HCC): ICD-10-CM

## 2017-11-20 LAB
GFR NON-AFRICAN AMERICAN: 59
PERFORMED ON: ABNORMAL
POC CREATININE: 1.2 MG/DL (ref 0.3–1.3)
POC SAMPLE TYPE: ABNORMAL

## 2017-11-20 PROCEDURE — 6360000004 HC RX CONTRAST MEDICATION: Performed by: UROLOGY

## 2017-11-20 PROCEDURE — 82565 ASSAY OF CREATININE: CPT

## 2017-11-20 PROCEDURE — 78306 BONE IMAGING WHOLE BODY: CPT

## 2017-11-20 PROCEDURE — A9561 TC99M OXIDRONATE: HCPCS | Performed by: UROLOGY

## 2017-11-20 PROCEDURE — 3430000000 HC RX DIAGNOSTIC RADIOPHARMACEUTICAL: Performed by: UROLOGY

## 2017-11-20 PROCEDURE — 71020 XR CHEST STANDARD TWO VW: CPT

## 2017-11-20 PROCEDURE — 74178 CT ABD&PLV WO CNTR FLWD CNTR: CPT

## 2017-11-20 RX ADMIN — TECHNETIUM TC 99M OXIDRONATE 20 MILLICURIE: 3.15 INJECTION, POWDER, LYOPHILIZED, FOR SOLUTION INTRAVENOUS at 12:53

## 2017-11-20 RX ADMIN — IOPAMIDOL 90 ML: 755 INJECTION, SOLUTION INTRAVENOUS at 10:30

## 2017-12-01 ENCOUNTER — INITIAL CONSULT (OUTPATIENT)
Dept: UROLOGY | Age: 72
End: 2017-12-01
Payer: MEDICARE

## 2017-12-01 ENCOUNTER — HOSPITAL ENCOUNTER (OUTPATIENT)
Dept: RADIATION ONCOLOGY | Facility: HOSPITAL | Age: 72
Setting detail: RADIATION/ONCOLOGY SERIES
End: 2017-12-01

## 2017-12-01 VITALS — WEIGHT: 206 LBS | BODY MASS INDEX: 35.17 KG/M2 | HEIGHT: 64 IN | TEMPERATURE: 97.6 F

## 2017-12-01 DIAGNOSIS — C61 PROSTATE CANCER (HCC): Primary | ICD-10-CM

## 2017-12-01 PROCEDURE — 99215 OFFICE O/P EST HI 40 MIN: CPT | Performed by: UROLOGY

## 2017-12-01 ASSESSMENT — ENCOUNTER SYMPTOMS
NAUSEA: 0
EYE DISCHARGE: 0
HEARTBURN: 0
EYE REDNESS: 0
SHORTNESS OF BREATH: 0
WHEEZING: 0

## 2017-12-01 NOTE — LETTER
[facility]  [address] [place] [address] Drive, [address]  [address] [facility] [address]  Phone: [phone]  Fax: [phone]    Pk Hale MD        December 1, 2017     Bro Mcginnis MD  [address] [place] [phone] [id number]      Patient: Arash Blackburn   MR Number: [id number]   YOB: 1945   Date of Visit: 12/1/2017       Dear Provider: Thank you for referring Arash Blackburn to me for evaluation. Below are the relevant portions of my assessment and plan of care. If you have questions, please do not hesitate to call me. I look forward to following Wayne Joy along with you.     Sincerely,        Pk Hale MD

## 2017-12-01 NOTE — PROGRESS NOTES
PSA's and possibly repeat prostate biopsy to check for grade or stage progression. 2.  Hormonal Therapy. I discussed the role of hormonal therapy in the form of LHRH agonists or antagonists such as Lupron, etc. Or surgical castration in the form of bilateral orchiectomy. The patient was told that this is primarily used in patients with roberta or metastatic disease or in conjunction with radiation therapy. The side effects include hot flashes, fatigue, breast enlargement, diminished libido, ED and long term development of osteoporosis, and cardiovascular disease risk. The patient was told he will encouraged to take supplemental Calcium and Vitamin D to prevent bone loss and may need additional medications for his bones. 3.  Radiation Therapy in the form of external beam radiation (IMRT) or prostate brachytherapy. Patient told that IMRT is given 5 days a week for 7-8 weeks and the side effects include rectal and bladder injury (OAB), erectile dysfunction (ED) and incontinence, urethral stricture disease. Long term the patient may experience hematuria and radiation cystitis or proctitis. Brachytherapy is done as an outpatient procedure under general anesthesia and postop the patient may experience dysuria, urgency, frequency, urge incontinence, increasing obstructive voiding symptoms and to a lesser degree than IMRT, rectal radiation injury and ED. Patients may require concomitant hormonal therapy with IMRT depending on the grade and extent of cancer. The patient may require hormonal therapy to downsize the prostate gland prior to brachytherapy. 4.  Open or Robotic assisted laparoscopic radical prostatectomy. Patient told about the options of open vs. Robotic assisted laparoscopic prostatectomy with or without pelvic lymphadenectomy. I discussed the risks including infection, bleeding, the risks of anesthesia, DVT, PE, MI, stroke, death, rectal injury and urethral stricture/bladder neck contracture. Urine leak, and lymphocele. I discussed the side effect of stress urinary incontinence which is temporary for most patients. I discussed the side effect of ED and the fact that it may take 6-18 months to recover this function. 5.  Cryotherapy. I discussed that I do not perform this as a primary therapy, and in most cases this is done after failure of radiation therapy and would require referral to an outside facility who does Cryrotherapy. Past Medical History:   Diagnosis Date    CAD (coronary artery disease)     Chronic back pain     Diverticulitis     Hyperlipidemia     Hypertension     Knee pain     Shoulder pain     Type II or unspecified type diabetes mellitus without mention of complication, not stated as uncontrolled        Past Surgical History:   Procedure Laterality Date    CORONARY ARTERY BYPASS GRAFT      SHOULDER ARTHROSCOPY Left 3/16/2017    SHOULDER ARTHROSCOPIC SUBACROMIAL DECOMPRESSION, DISTAL CLAVICLE RESECTION, GLENUHUMERAL SYNOVECTOMY, OPEN ROTATOR CUFF REPAIR AND OPEN BICEPS TENODESIS LEFT  performed by Nissa Eason DO at [facility] OR    TONSILLECTOMY      age 9       Current Outpatient Prescriptions   Medication Sig Dispense Refill    omeprazole (PRILOSEC) 20 MG delayed release capsule Take 20 mg by mouth daily      Aspirin Buf,TiZxb-OmKti-AvVfx, (BUFFERED ASPIRIN) 325 MG TABS Take 1 tablet by mouth once      glyBURIDE (DIABETA) 5 MG tablet Take 5 mg by mouth 2 times daily (with meals)      losartan (COZAAR) 50 MG tablet Take 12.5 mg by mouth daily      atorvastatin (LIPITOR) 20 MG tablet Take 20 mg by mouth daily      Multiple Vitamins-Minerals (MULTIVITAMIN PO) Take  by mouth daily.  metFORMIN (GLUCOPHAGE) 500 MG tablet Take 500 mg by mouth 2 times daily (with meals). No current facility-administered medications for this visit.         Allergies   Allergen Reactions    Valium [Diazepam] Nausea Only       Social History     Social History    Marital status:      Spouse name: N/A    Number of children: N/A    Years of education: N/A     Social History Main Topics    Smoking status: Former Smoker    Smokeless tobacco: Never Used    Alcohol use No    Drug use: No    Sexual activity: Not Asked     Other Topics Concern    None     Social History Narrative    None       Family History   Problem Relation Age of Onset    Cancer Mother     Heart Disease Father     Colon Cancer Neg Hx     Colon Polyps Neg Hx     Liver Cancer Neg Hx     Liver Disease Neg Hx     Esophageal Cancer Neg Hx     Rectal Cancer Neg Hx     Stomach Cancer Neg Hx        REVIEW OF SYSTEMS:  Review of Systems   Constitutional: Negative for chills and fever. HENT: Negative for hearing loss. Eyes: Negative for discharge and redness. Respiratory: Negative for shortness of breath and wheezing. Cardiovascular: Negative for leg swelling and PND. Gastrointestinal: Negative for heartburn and nausea. Genitourinary: Negative for dysuria, flank pain, frequency, hematuria and urgency. Musculoskeletal: Negative for myalgias and neck pain. Skin: Negative for itching and rash. Neurological: Negative for seizures, loss of consciousness and headaches. Endo/Heme/Allergies: Negative for environmental allergies and polydipsia. Psychiatric/Behavioral: Negative for depression and suicidal ideas. PHYSICAL EXAM:  Temp 97.6 °F (36.4 °C) (Temporal)   Ht 5' 4\" (1.626 m)   Wt 206 lb (93.4 kg)   BMI 35.36 kg/m²   Physical Exam   Constitutional: He is oriented to person, place, and time. He appears well-developed and well-nourished. No distress. HENT:   Head: Normocephalic and atraumatic. Nose: Nose normal.   Eyes: Conjunctivae and EOM are normal. Pupils are equal, round, and reactive to light. No scleral icterus. Neck: Normal range of motion. Neck supple. No tracheal deviation present. Cardiovascular: Normal rate, regular rhythm and intact distal pulses. right lateral mid needle biopsy: Prostatic adenocarcinoma  (Austin's grade 4+3 equal 7), measuring 0.3 cm in greatest dimension and  occupying approximately 20% of the evaluated core. C.  Prostate, right lateral apex needle biopsy: Prostatic adenocarcinoma  ([place]'s grade 4+3 equal 7), measuring 1.1 cm in greatest linear  dimension and occupying approximately 90% of the evaluated core. D.  Prostate, right base needle biopsy: Prostatic adenocarcinoma  (Austin's grade 4+4 = 8), measuring 0.1 cm in greatest dimension and  occupying approximately 5% of the evaluated core. E.  Prostate, right mid needle biopsy: Prostatic adenocarcinoma  (Austin's grade 4+4 = 8), measuring 0.3 cm in greatest dimension and  occupying approximately 20% of the evaluated core. F.  Prostate, right [place] needle biopsy: Prostatic adenocarcinoma  (Austin's grade 4+3 equal 7), measuring 1.2 cm in greatest linear  dimension and occupying approximately 85% of the evaluated core. G.  Prostate, right [place] nodule needle biopsy: Prostatic adenocarcinoma  (Austin's grade 4+4 = 8), measuring 1.8 cm in greatest dimension and  occupying 100% of the evaluated core. H.  Prostate, left lateral base needle biopsy: Benign prostatic  parenchyma. I.  Prostate, left lateral mid needle biopsy: Benign prostatic  parenchyma. J.  Prostate, left lateral apex needle biopsy: Benign prostatic  parenchyma. K.  Prostate, left base needle biopsy: Benign prostatic parenchyma. L.  Prostate, left mid needle biopsy: Benign prostatic parenchyma. M.  Prostate, left apex needle biopsy: Benign prostatic parenchyma.       PROSTATE GLAND:  Needle biopsy    TUMOR    Histologic Type:   Adenocarcinoma (acinar, not otherwise specified)  Histologic Grade    Austin Pattern    Austin pattern (specify)    Primary (Predominant) Pattern:  Grade 4    Secondary (Worst Remaining) Pattern:  Grade 3  Total Austin Score:  7  EXTENT    Tumor

## 2017-12-08 ENCOUNTER — HOSPITAL ENCOUNTER (OUTPATIENT)
Dept: MRI IMAGING | Age: 72
Discharge: HOME | End: 2017-12-08
Payer: MEDICARE

## 2017-12-08 DIAGNOSIS — C61 PROSTATE CANCER (HCC): ICD-10-CM

## 2017-12-08 PROCEDURE — 72157 MRI CHEST SPINE W/O & W/DYE: CPT

## 2017-12-08 PROCEDURE — 6360000004 HC RX CONTRAST MEDICATION: Performed by: UROLOGY

## 2017-12-08 PROCEDURE — A9577 INJ MULTIHANCE: HCPCS | Performed by: UROLOGY

## 2017-12-08 RX ADMIN — GADOBENATE DIMEGLUMINE 18 ML: 529 INJECTION, SOLUTION INTRAVENOUS at 08:43

## 2017-12-11 ENCOUNTER — OFFICE VISIT (OUTPATIENT)
Dept: UROLOGY | Age: 72
End: 2017-12-11
Payer: MEDICARE

## 2017-12-11 VITALS
BODY MASS INDEX: 33.43 KG/M2 | SYSTOLIC BLOOD PRESSURE: 154 MMHG | HEART RATE: 69 BPM | WEIGHT: 208 LBS | TEMPERATURE: 96.7 F | DIASTOLIC BLOOD PRESSURE: 84 MMHG | HEIGHT: 66 IN

## 2017-12-11 DIAGNOSIS — C61 PROSTATE CANCER (HCC): Primary | ICD-10-CM

## 2017-12-11 DIAGNOSIS — M48.04 SPINAL STENOSIS OF THORACIC REGION: ICD-10-CM

## 2017-12-11 PROCEDURE — 99214 OFFICE O/P EST MOD 30 MIN: CPT | Performed by: UROLOGY

## 2017-12-11 PROCEDURE — 96402 CHEMO HORMON ANTINEOPL SQ/IM: CPT | Performed by: UROLOGY

## 2017-12-11 PROCEDURE — 99999 PR OFFICE/OUTPT VISIT,PROCEDURE ONLY: CPT | Performed by: UROLOGY

## 2017-12-11 ASSESSMENT — ENCOUNTER SYMPTOMS
WHEEZING: 0
HEARTBURN: 0
SHORTNESS OF BREATH: 0
NAUSEA: 0
DOUBLE VISION: 0
BLURRED VISION: 0
SORE THROAT: 0

## 2017-12-11 NOTE — PROGRESS NOTES
Susa Lanes is a 67 y.o. male who presents today   Chief Complaint   Patient presents with    Follow-up     I'm here for a f/u after a MRI     Prostate Cancer:  Patient is here today for prostate cancer which was first diagnosed on 11/03/17. His last several PSA values are as follows: 7.3 done on 10/10/17  Patient was noted to have an abnormal VIKI with a nodule on his right side  His prostate volume was 48.4 grams. He had a prostate biopsy consisting of a total of 12 cores with 6 positive cores. TRUS Findings consisted of  urinary discrete hypoechoic area involving the right lateral apical area and right apical region. He has right sided disease with a Superior score of 4+4 = 8 . Location of the the Cancer: All 6 cores on the right side were positive none on the left  His clinical TNM stage was: T2b  His pathological TNM stage was: T2b  The patient has a staging CT Showed no adenopathy or distant metastasis. , Bone scan showed some uptake in the thoracic spine and MRI was recommended he comes in today after getting the MRI done. Previous treatment of prostate cancer: none  Patient has Normal erectile function no  Patient describes no significant voiding symptoms at this time he does have some pain in his mid thoracic spine that he relates this to prior back problems that he's had.  No weight loss  He was seen proximally one week ago for consultation and he is elected to proceed with neoadjuvant hormonal  therapy and external beam radiation therapy            Past Medical History:   Diagnosis Date    CAD (coronary artery disease)     Chronic back pain     Diverticulitis     Hyperlipidemia     Hypertension     Knee pain     Shoulder pain     Type II or unspecified type diabetes mellitus without mention of complication, not stated as uncontrolled        Past Surgical History:   Procedure Laterality Date    CORONARY ARTERY BYPASS GRAFT      SHOULDER ARTHROSCOPY Left 3/16/2017    SHOULDER Negative for chest pain and palpitations. Gastrointestinal: Negative for heartburn and nausea. Genitourinary: Negative for dysuria, flank pain, frequency, hematuria and urgency. Musculoskeletal: Negative for falls and neck pain. Skin: Negative for itching and rash. Neurological: Negative for dizziness and tingling. Endo/Heme/Allergies: Does not bruise/bleed easily. Psychiatric/Behavioral: The patient does not have insomnia. PHYSICAL EXAM:  BP (!) 154/84   Pulse 69   Temp 96.7 °F (35.9 °C) (Temporal)   Ht 5' 6\" (1.676 m)   Wt 208 lb (94.3 kg)   BMI 33.57 kg/m²   Physical Exam   Constitutional: He is oriented to person, place, and time. He appears well-developed and well-nourished. No distress. HENT:   Head: Normocephalic and atraumatic. Nose: Nose normal.   Eyes: Conjunctivae and EOM are normal. Pupils are equal, round, and reactive to light. No scleral icterus. Neck: Normal range of motion. Neck supple. No tracheal deviation present. Cardiovascular: Normal rate, regular rhythm and intact distal pulses. Pulmonary/Chest: Effort normal and breath sounds normal. No stridor. He exhibits no tenderness. Abdominal: Soft. Bowel sounds are normal. He exhibits no distension and no mass. There is no tenderness. Musculoskeletal: Normal range of motion. He exhibits no edema or tenderness. Lymphadenopathy:     He has no cervical adenopathy. Neurological: He is alert and oriented to person, place, and time. Skin: Skin is warm and dry. No erythema. Psychiatric: He has a normal mood and affect. His behavior is normal. Judgment normal.           DATA:  IMAGING:   MRI of the thoracic spine on 12/08/17 shows no evidence of bony metastasis however there is spinal canal stenosis at T11-T12 due to marked facet ligamentum flavum hypertrophy as well as some moderate left foraminal stenosis at T11 12 and some mild right at the same level    1. Prostate cancer [facility])  MRI showed no metastasis.  It did show spinal stenosis. We again reviewed his options of surgery versus radiation with neoadjuvant hormonal therapy he's elected the radiation he has appointment to see radiation oncology and approximate 1 week we will initiate his neoadjuvant hormonal therapy today with a 6 month Lupron he'll follow-up in 6 months for his next Lupron injection I recommended minimum of 1 year of Lupron. And if he tolerates up to 2 years. After he seen in radiation oncology he will be scheduled to see me for fiduciary radiation marker placements. Otherwise he'll follow-up to see me 6 weeks after completion of radiation with a PSA prior  - leuprolide (LUPRON) injection 45 mg; Inject 45 mg into the muscle once  - PSA, Diagnostic; Future    2. Spinal stenosis of thoracic region  MRI shows no metastasis shows some degenerative spine disease spinal stenosis and foraminal stenosis T11 to T12 we'll refer him to Dr. Ubaldo Hunter This Encounter   Procedures    PSA, Diagnostic     PSA prior to next visit     Standing Status:   Future     Standing Expiration Date:   12/11/2018        Return for 6 Month Lupron, PSA prior to vext visit.

## 2017-12-11 NOTE — LETTER
[id number] [facility]  [address] [place] [address], [address] [place] [id number]  Phone: [phone]  Fax: [phone]    Nohemy Evans MD        December 11, 2017     Kyeligh Harrison MD  [address] [place] [id number]      Patient: Zhang Gandara   MR Number: [id number]   YOB: 1945   Date of Visit: 12/11/2017       Dear Provider: Thank you for referring Zhang Gandara to me for evaluation. Below are the relevant portions of my assessment and plan of care. If you have questions, please do not hesitate to call me. I look forward to following Faustino Escalera along with you.     Sincerely,        Nohemy Evans MD

## 2017-12-15 ENCOUNTER — ANESTHESIA (OUTPATIENT)
Dept: ENDOSCOPY | Age: 72
End: 2017-12-15
Payer: MEDICARE

## 2017-12-15 ENCOUNTER — ANESTHESIA EVENT (OUTPATIENT)
Dept: ENDOSCOPY | Age: 72
End: 2017-12-15
Payer: MEDICARE

## 2017-12-15 ENCOUNTER — HOSPITAL ENCOUNTER (OUTPATIENT)
Age: 72
Setting detail: OUTPATIENT SURGERY
Discharge: HOME | End: 2017-12-15
Attending: INTERNAL MEDICINE | Admitting: INTERNAL MEDICINE
Payer: MEDICARE

## 2017-12-15 VITALS
OXYGEN SATURATION: 97 % | RESPIRATION RATE: 15 BRPM | DIASTOLIC BLOOD PRESSURE: 75 MMHG | SYSTOLIC BLOOD PRESSURE: 139 MMHG

## 2017-12-15 VITALS
WEIGHT: 206 LBS | HEART RATE: 64 BPM | OXYGEN SATURATION: 99 % | BODY MASS INDEX: 33.11 KG/M2 | SYSTOLIC BLOOD PRESSURE: 117 MMHG | TEMPERATURE: 97.9 F | RESPIRATION RATE: 18 BRPM | DIASTOLIC BLOOD PRESSURE: 65 MMHG | HEIGHT: 66 IN

## 2017-12-15 PROBLEM — Z83.71 FAMILY HISTORY OF COLONIC POLYPS: Status: ACTIVE | Noted: 2017-12-15

## 2017-12-15 LAB
GLUCOSE BLD-MCNC: 129 MG/DL (ref 70–99)
PERFORMED ON: ABNORMAL

## 2017-12-15 PROCEDURE — 3700000001 HC ADD 15 MINUTES (ANESTHESIA): Performed by: INTERNAL MEDICINE

## 2017-12-15 PROCEDURE — 7100000011 HC PHASE II RECOVERY - ADDTL 15 MIN: Performed by: INTERNAL MEDICINE

## 2017-12-15 PROCEDURE — 87077 CULTURE AEROBIC IDENTIFY: CPT

## 2017-12-15 PROCEDURE — 45380 COLONOSCOPY AND BIOPSY: CPT | Performed by: INTERNAL MEDICINE

## 2017-12-15 PROCEDURE — 3700000000 HC ANESTHESIA ATTENDED CARE: Performed by: INTERNAL MEDICINE

## 2017-12-15 PROCEDURE — 43239 EGD BIOPSY SINGLE/MULTIPLE: CPT | Performed by: INTERNAL MEDICINE

## 2017-12-15 PROCEDURE — 3609010300 HC COLONOSCOPY W/BIOPSY SINGLE/MULTIPLE: Performed by: INTERNAL MEDICINE

## 2017-12-15 PROCEDURE — 88305 TISSUE EXAM BY PATHOLOGIST: CPT

## 2017-12-15 PROCEDURE — 7100000010 HC PHASE II RECOVERY - FIRST 15 MIN: Performed by: INTERNAL MEDICINE

## 2017-12-15 PROCEDURE — 2500000003 HC RX 250 WO HCPCS: Performed by: INTERNAL MEDICINE

## 2017-12-15 PROCEDURE — 6360000002 HC RX W HCPCS: Performed by: NURSE ANESTHETIST, CERTIFIED REGISTERED

## 2017-12-15 PROCEDURE — 2500000003 HC RX 250 WO HCPCS: Performed by: NURSE ANESTHETIST, CERTIFIED REGISTERED

## 2017-12-15 PROCEDURE — 2580000003 HC RX 258: Performed by: INTERNAL MEDICINE

## 2017-12-15 PROCEDURE — 3609012400 HC EGD TRANSORAL BIOPSY SINGLE/MULTIPLE: Performed by: INTERNAL MEDICINE

## 2017-12-15 PROCEDURE — 82948 REAGENT STRIP/BLOOD GLUCOSE: CPT

## 2017-12-15 RX ORDER — LIDOCAINE HYDROCHLORIDE 10 MG/ML
1 INJECTION, SOLUTION EPIDURAL; INFILTRATION; INTRACAUDAL; PERINEURAL ONCE
Status: COMPLETED | OUTPATIENT
Start: 2017-12-15 | End: 2017-12-15

## 2017-12-15 RX ORDER — LIDOCAINE HYDROCHLORIDE 10 MG/ML
INJECTION, SOLUTION EPIDURAL; INFILTRATION; INTRACAUDAL; PERINEURAL PRN
Status: DISCONTINUED | OUTPATIENT
Start: 2017-12-15 | End: 2017-12-15 | Stop reason: SDUPTHER

## 2017-12-15 RX ORDER — SODIUM CHLORIDE, SODIUM LACTATE, POTASSIUM CHLORIDE, CALCIUM CHLORIDE 600; 310; 30; 20 MG/100ML; MG/100ML; MG/100ML; MG/100ML
INJECTION, SOLUTION INTRAVENOUS CONTINUOUS
Status: DISCONTINUED | OUTPATIENT
Start: 2017-12-15 | End: 2017-12-15 | Stop reason: HOSPADM

## 2017-12-15 RX ORDER — PROPOFOL 10 MG/ML
INJECTION, EMULSION INTRAVENOUS PRN
Status: DISCONTINUED | OUTPATIENT
Start: 2017-12-15 | End: 2017-12-15 | Stop reason: SDUPTHER

## 2017-12-15 RX ADMIN — LIDOCAINE HYDROCHLORIDE 5 ML: 10 INJECTION, SOLUTION EPIDURAL; INFILTRATION; INTRACAUDAL; PERINEURAL at 08:42

## 2017-12-15 RX ADMIN — PROPOFOL 300 MG: 10 INJECTION, EMULSION INTRAVENOUS at 08:42

## 2017-12-15 RX ADMIN — LIDOCAINE HYDROCHLORIDE 1 ML: 10 INJECTION, SOLUTION EPIDURAL; INFILTRATION; INTRACAUDAL; PERINEURAL at 07:43

## 2017-12-15 RX ADMIN — SODIUM CHLORIDE, POTASSIUM CHLORIDE, SODIUM LACTATE AND CALCIUM CHLORIDE: 600; 310; 30; 20 INJECTION, SOLUTION INTRAVENOUS at 07:43

## 2017-12-15 ASSESSMENT — PAIN - FUNCTIONAL ASSESSMENT: PAIN_FUNCTIONAL_ASSESSMENT: 0-10

## 2017-12-15 NOTE — ANESTHESIA PRE PROCEDURE
Department of Anesthesiology  Preprocedure Note       Name:  Mala Siegel   Age:  67 y.o.  :  1945                                          MRN:  [id number]         Date:  12/15/2017      Surgeon: Brandon Bagley):  Claudeen Hasting Hendon, DO    Procedure: Procedure(s):  EGD BIOPSY  COLONOSCOPY DIAGNOSTIC OR SCREENING    Medications prior to admission:   Prior to Admission medications    Medication Sig Start Date End Date Taking? Authorizing Provider   omeprazole (PRILOSEC) 20 MG delayed release capsule Take 20 mg by mouth daily    Historical Provider, MD   Aspirin Buf,WxXti-WiAuh-SuVvu, (BUFFERED ASPIRIN) 325 MG TABS Take 1 tablet by mouth once    Historical Provider, MD   glyBURIDE (DIABETA) 5 MG tablet Take 5 mg by mouth 2 times daily (with meals)    Historical Provider, MD   losartan (COZAAR) 50 MG tablet Take 12.5 mg by mouth daily    Historical Provider, MD   atorvastatin (LIPITOR) 20 MG tablet Take 20 mg by mouth daily    Historical Provider, MD   Multiple Vitamins-Minerals (MULTIVITAMIN PO) Take  by mouth daily. Historical Provider, MD   metFORMIN (GLUCOPHAGE) 500 MG tablet Take 500 mg by mouth 2 times daily (with meals). Historical Provider, MD       Current medications:    Current Facility-Administered Medications   Medication Dose Route Frequency Provider Last Rate Last Dose    lactated ringers infusion   Intravenous Continuous Vincent Guzman  mL/hr at 12/15/17 0743         Allergies:     Allergies   Allergen Reactions    Valium [Diazepam] Nausea Only       Problem List:    Patient Active Problem List   Diagnosis Code    Complete tear of left rotator cuff M75.122    Anemia D64.9    Chronic heartburn R12    Prostate cancer ([facility] [address] C61       Past Medical History:        Diagnosis Date    CAD (coronary artery disease)     Chronic back pain     Diverticulitis     Hip pain     right    Hyperlipidemia     Hypertension     Knee pain     Shoulder pain     Type II or unspecified type 11/28/2017    PROT 7.5 11/28/2017    CALCIUM 9.4 11/28/2017    BILITOT 0.3 11/28/2017    ALKPHOS 52 11/28/2017    AST 29 11/28/2017    ALT 30 11/28/2017       POC Tests:   Recent Labs      12/15/17   0742   POCGLU  129*       Coags: No results found for: PROTIME, INR, APTT    HCG (If Applicable): No results found for: PREGTESTUR, PREGSERUM, HCG, HCGQUANT     ABGs: No results found for: PHART, PO2ART, HYO9ZMF, KJI6VYB, BEART, R3AHRWLT     Type & Screen (If Applicable):  No results found for: LABABO, LABRH    Anesthesia Evaluation   no history of anesthetic complications:   Airway: Mallampati: II  TM distance: >3 FB   Neck ROM: full  Mouth opening: > = 3 FB Dental: normal exam         Pulmonary:Negative Pulmonary ROS and normal exam                               Cardiovascular:    (+) hypertension: moderate, CAD: obstructive, CABG/stent (cabg x4 in 2007 ): no interval change, hyperlipidemia                  Neuro/Psych:   Negative Neuro/Psych ROS              GI/Hepatic/Renal:   (+) GERD: well controlled, bowel prep, morbid obesity          Endo/Other:    (+) Type II DM, well controlled, , malignancy/cancer (prostate cancer diagnosed a week ago). Pt had no PAT visit       Abdominal:           Vascular: negative vascular ROS. Anesthesia Plan      general     ASA 3       Induction: intravenous. Anesthetic plan and risks discussed with patient.                       Claude Mclean CRNA   12/15/2017

## 2017-12-15 NOTE — H&P
Patient Name: Sweetie Roa  : 1945  MRN: [id number]    Allergies: Allergies   Allergen Reactions    Valium [Diazepam] Nausea Only         ENDOSCOPY / COLONOSCOPY / BRONCHOSCOPY      PRE-SEDATION ASSESSMENT      Procedure:    [x] Colonoscopy     [x] Endoscopy      [] ERCP      [] Bronchoscopy      [] Other  [] History and Physical completed in chart for Inpatient or within 30 daysfrom office. I have examined the patient's status immediately prior to the procedure and:    [x] No interval change in patient status since H&P completed  [] Interval change in patient status (explained below)           BRIEF H&P    HPI/changes/indicators/diagnosis  Active Hospital Problems    Diagnosis Date Noted    Family history of colonic polyps [Z83.71] 12/15/2017    Anemia [D64.9] 2017    Chronic heartburn [R12] 2017       Medications:   Prior to Admission medications    Medication Sig Start Date End Date Taking? Authorizing Provider   omeprazole (PRILOSEC) 20 MG delayed release capsule Take 20 mg by mouth daily    Historical Provider, MD   Aspirin Buf,WnEfl-BySha-WfLtw, (BUFFERED ASPIRIN) 325 MG TABS Take 1 tablet by mouth once    Historical Provider, MD   glyBURIDE (DIABETA) 5 MG tablet Take 5 mg by mouth 2 times daily (with meals)    Historical Provider, MD   losartan (COZAAR) 50 MG tablet Take 12.5 mg by mouth daily    Historical Provider, MD   atorvastatin (LIPITOR) 20 MG tablet Take 20 mg by mouth daily    Historical Provider, MD   Multiple Vitamins-Minerals (MULTIVITAMIN PO) Take  by mouth daily. Historical Provider, MD   metFORMIN (GLUCOPHAGE) 500 MG tablet Take 500 mg by mouth 2 times daily (with meals). Historical Provider, MD       Allergies:   is allergic to valium [diazepam].       Vital Signs:   Vitals:    12/15/17 0736   BP: (!) 158/81   Pulse: 66   Resp: 22   Temp: 97.8 °F (36.6 °C)   SpO2: 98%       ROS:  Cardiac:  [x]WNL  []Comments:  Pulmonary:  [x]WNL   []Comments:  Neuro/Mental Status:  [x]WNL  []Comments:  Abdominal:                   Active Hospital Problems    Diagnosis Date Noted    Family history of colonic polyps [Z83.71] 12/15/2017    Anemia [D64.9] 11/02/2017    Chronic heartburn [R12] 11/02/2017        All other pertinent GI symptoms negative. Physical Exam:  Cardiac:  [x]WNL  []Comments:  Pulmonary:  [x]WNL   []Comments:  Neuro/Mental Status:  [x]WNL  []Comments:  Abdominal:  [x]WNL    []Comments:  Other:   []WNL  []Comments:    Informed Consent:  The risks and benefits of the procedure have been discussed with either the patient or if they cannot consent, their representative. Assessment:  Patient examined and appropriate for planned sedation and procedure. Plan:  Proceed with planned sedation and procedure as above.     Vincent Guzman DO  8:38 AM

## 2017-12-16 LAB — CLOTEST: NEGATIVE

## 2017-12-20 ENCOUNTER — CONSULT (OUTPATIENT)
Dept: RADIATION ONCOLOGY | Facility: HOSPITAL | Age: 72
End: 2017-12-20

## 2017-12-20 VITALS
SYSTOLIC BLOOD PRESSURE: 130 MMHG | DIASTOLIC BLOOD PRESSURE: 70 MMHG | BODY MASS INDEX: 33.11 KG/M2 | WEIGHT: 206 LBS | HEIGHT: 66 IN

## 2017-12-20 DIAGNOSIS — Z87.891 FORMER SMOKER: ICD-10-CM

## 2017-12-20 DIAGNOSIS — Z71.9 ENCOUNTER FOR CONSULTATION: ICD-10-CM

## 2017-12-20 DIAGNOSIS — Z79.818 USE OF LEUPROLIDE ACETATE (LUPRON): ICD-10-CM

## 2017-12-20 DIAGNOSIS — C61 CANCER OF PROSTATE W/LOW RECURRENCE RISK (T1-2A, GLEASON<7 & PSA<10) (HCC): Primary | ICD-10-CM

## 2017-12-20 PROCEDURE — G0463 HOSPITAL OUTPT CLINIC VISIT: HCPCS | Performed by: RADIOLOGY

## 2017-12-20 RX ORDER — LOSARTAN POTASSIUM 50 MG/1
50 TABLET ORAL DAILY
COMMUNITY
End: 2020-06-05 | Stop reason: SDUPTHER

## 2017-12-20 RX ORDER — ROSUVASTATIN CALCIUM 5 MG/1
5 TABLET, COATED ORAL DAILY
COMMUNITY
End: 2020-08-17 | Stop reason: SDUPTHER

## 2017-12-20 RX ORDER — OMEPRAZOLE 20 MG/1
20 CAPSULE, DELAYED RELEASE ORAL DAILY
COMMUNITY
End: 2020-10-23 | Stop reason: SDUPTHER

## 2017-12-20 RX ORDER — ASPIRIN 325 MG
325 TABLET ORAL DAILY
COMMUNITY

## 2017-12-20 RX ORDER — GLYBURIDE 1.25 MG/1
5 TABLET ORAL
COMMUNITY
End: 2020-02-18 | Stop reason: SDUPTHER

## 2018-01-05 ENCOUNTER — HOSPITAL ENCOUNTER (OUTPATIENT)
Dept: RADIATION ONCOLOGY | Facility: HOSPITAL | Age: 73
Setting detail: RADIATION/ONCOLOGY SERIES
End: 2018-01-05

## 2018-01-26 ENCOUNTER — PROCEDURE VISIT (OUTPATIENT)
Dept: UROLOGY | Age: 73
End: 2018-01-26
Payer: MEDICARE

## 2018-01-26 VITALS — TEMPERATURE: 96.3 F

## 2018-01-26 DIAGNOSIS — C61 PROSTATE CANCER (HCC): Primary | ICD-10-CM

## 2018-01-26 PROCEDURE — 76872 US TRANSRECTAL: CPT | Performed by: UROLOGY

## 2018-01-26 PROCEDURE — 55876 PLACE RT DEVICE/MARKER PROS: CPT | Performed by: UROLOGY

## 2018-01-26 PROCEDURE — 99999 PR OFFICE/OUTPT VISIT,PROCEDURE ONLY: CPT | Performed by: UROLOGY

## 2018-01-26 PROCEDURE — 96372 THER/PROPH/DIAG INJ SC/IM: CPT | Performed by: UROLOGY

## 2018-01-26 RX ORDER — GENTAMICIN SULFATE 40 MG/ML
80 INJECTION, SOLUTION INTRAMUSCULAR; INTRAVENOUS ONCE
Status: COMPLETED | OUTPATIENT
Start: 2018-01-26 | End: 2018-01-26

## 2018-01-26 RX ADMIN — GENTAMICIN SULFATE 80 MG: 40 INJECTION, SOLUTION INTRAMUSCULAR; INTRAVENOUS at 08:02

## 2018-01-26 NOTE — PROGRESS NOTES
adiation therapy. Meagan Goncalves is a 67 y.o. male who presents today   Chief Complaint   Patient presents with    Follow-up     im here today for my seed  placement     Prostate Cancer         Elevated PSA:  Patient is here today for history of an elevated PSA. HPI*  Prostate Cancer:  Patient is here today for prostate cancer which was first diagnosed on 11/03/17. His last several PSA values are as follows: 7.3 done on 10/10/17  Patient was noted to have an abnormal VIKI with a nodule on his right side  His prostate volume was 48.4 grams. He had a prostate biopsy consisting of a total of 12 cores with 6 positive cores. TRUS Findings consisted of  urinary discrete hypoechoic area involving the right lateral apical area and right apical region. He has right sided disease with a Austin score of 4+4 = 8 . Location of the the Cancer: All 6 cores on the right side were positive none on the left  His clinical TNM stage was: T2b  His pathological TNM stage was: T2b  The patient has a staging CT Showed no adenopathy or distant metastasis. , Bone scan showed some uptake in the thoracic spine and MRI was recommended he comes in today after getting the MRI done. Previous treatment of prostate cancer: none  Patient has Normal erectile function no  Patient describes no significant voiding symptoms at this time he does have some pain in his mid thoracic spine that he relates this to prior back problems that he's had. No weight loss  He was seen proximally one week ago for consultation and he is elected to proceed with neoadjuvant hormonal  therapy and external beam radiation therapy          Patient underwent consultation for his clinically localized prostate cancer on 12/11/17 it's been elected to proceed with external beam radiation therapy with neoadjuvant hormonal therapy. He did receive his 6 month Lupron injection on 12/11/17.  He is here today for placement of fiduciary prostate markers for his r**    PROSTATE U/S AND BIOPSY  As per my instructions, the patient took an antibiotic Beginning 1 day prior to this procedure. To be continued daily until 2 days post biopsy. Gentamicin 80mg IM was given today. He also had a Fleets enema. Surgeon: Mila Harmon MD  1/26/18  Indications for exam: Fiduciary radiation marker placement  Anesthesia: 1% Lidocaine periprostatic block bilaterally at junction of base of prostate and seminal vesicle, and apex   Ultrasound Findings:  Seminal Vesicles: intact bilaterally  Transitional Zone: Normal echogenic structure  Peripheral Zone: Normal echogenic structure  Bladder: Minimal postvoid residual  1st marker was placed by ultrasound guidance into the right base. 2nd marker was placed in the right apex and 3rd marker was placed in the left lateral mid gland. Postop medications: Cipro 500 mg po bid for 2 more days. Recommendations: He will follow up with radiation oncology. Follow up with me in 6 weeks after completion of radiation therapy. He'll continue his Lupron     Postop Prostate Biopsy Instructions:  Patient told to drink plenty of fluids and to take it easy the day of the prostate procedure, and the next few days. He was encouraged to use sitz baths as needed for relief of rectal discomfort after the biopsy. He was told he may notice blood or a rust color in his semen for several months after the biopsy. He was told to avoid resuming blood thinners or aspirin until the rectal bleeding or visible blood in urine has stopped for at least 48 hours. He should take his antibiotics to completion as prescribed. He was instructed to call our office immediately or to go to the Emergency Room if he experiences a fever over 101, shaking chills or an inability to urinate. 1. Prostate cancer [facility])  Patient will undergo radiation therapy with neoadjuvant hormonal therapy.  He'll follow-up 6 weeks after completion of his radiation therapy to get a PSA  - gentamicin (GARAMYCIN) injection 80 mg; Inject 2 mLs into the muscle once  - PLACE RADIOTHER DEVICE/MARKER, PROSTATE      Orders Placed This Encounter   Procedures    PLACE [address] [place] [address]        Return for PSA prior to vext visit.

## 2018-02-02 ENCOUNTER — HOSPITAL ENCOUNTER (OUTPATIENT)
Dept: RADIATION ONCOLOGY | Facility: HOSPITAL | Age: 73
Setting detail: RADIATION/ONCOLOGY SERIES
End: 2018-02-02

## 2018-03-02 ENCOUNTER — HOSPITAL ENCOUNTER (OUTPATIENT)
Dept: RADIATION ONCOLOGY | Facility: HOSPITAL | Age: 73
Setting detail: RADIATION/ONCOLOGY SERIES
End: 2018-03-02

## 2018-03-02 ENCOUNTER — HOSPITAL ENCOUNTER (OUTPATIENT)
Dept: RADIATION ONCOLOGY | Facility: HOSPITAL | Age: 73
Setting detail: RADIATION/ONCOLOGY SERIES
Discharge: HOME | End: 2018-03-02

## 2018-03-02 ENCOUNTER — OFFICE VISIT (OUTPATIENT)
Dept: RADIATION ONCOLOGY | Facility: HOSPITAL | Age: 73
End: 2018-03-02

## 2018-03-02 VITALS — WEIGHT: 208 LBS | HEIGHT: 66 IN | BODY MASS INDEX: 33.43 KG/M2

## 2018-03-02 DIAGNOSIS — Z71.89 ENCOUNTER TO DISCUSS PROCEDURE: ICD-10-CM

## 2018-03-02 DIAGNOSIS — Z87.891 FORMER SMOKER: ICD-10-CM

## 2018-03-02 DIAGNOSIS — C61 PROSTATE CANCER (HCC): Primary | ICD-10-CM

## 2018-03-02 DIAGNOSIS — Z79.818 USE OF LEUPROLIDE ACETATE (LUPRON): ICD-10-CM

## 2018-03-02 PROCEDURE — 77334 RADIATION TREATMENT AID(S): CPT | Performed by: RADIOLOGY

## 2018-03-12 PROCEDURE — 77300 RADIATION THERAPY DOSE PLAN: CPT | Performed by: RADIOLOGY

## 2018-03-12 PROCEDURE — 77301 RADIOTHERAPY DOSE PLAN IMRT: CPT | Performed by: RADIOLOGY

## 2018-03-12 PROCEDURE — 77338 DESIGN MLC DEVICE FOR IMRT: CPT | Performed by: RADIOLOGY

## 2018-03-20 PROCEDURE — 77385: CPT | Performed by: RADIOLOGY

## 2018-03-21 ENCOUNTER — HOSPITAL ENCOUNTER (OUTPATIENT)
Dept: RADIATION ONCOLOGY | Facility: HOSPITAL | Age: 73
Discharge: HOME | End: 2018-03-21

## 2018-03-21 PROCEDURE — 77386: CPT | Performed by: RADIOLOGY

## 2018-03-21 PROCEDURE — 77385: CPT | Performed by: RADIOLOGY

## 2018-03-22 ENCOUNTER — HOSPITAL ENCOUNTER (OUTPATIENT)
Dept: RADIATION ONCOLOGY | Facility: HOSPITAL | Age: 73
Discharge: HOME | End: 2018-03-22

## 2018-03-22 PROCEDURE — 77336 RADIATION PHYSICS CONSULT: CPT | Performed by: RADIOLOGY

## 2018-03-22 PROCEDURE — 77385: CPT | Performed by: RADIOLOGY

## 2018-03-23 ENCOUNTER — HOSPITAL ENCOUNTER (OUTPATIENT)
Dept: RADIATION ONCOLOGY | Facility: HOSPITAL | Age: 73
Discharge: HOME | End: 2018-03-23

## 2018-03-23 PROCEDURE — 77385: CPT | Performed by: RADIOLOGY

## 2018-03-26 ENCOUNTER — HOSPITAL ENCOUNTER (OUTPATIENT)
Dept: RADIATION ONCOLOGY | Facility: HOSPITAL | Age: 73
Setting detail: RADIATION/ONCOLOGY SERIES
Discharge: HOME | End: 2018-03-26

## 2018-03-26 PROCEDURE — 77385: CPT | Performed by: RADIOLOGY

## 2018-03-27 ENCOUNTER — APPOINTMENT (OUTPATIENT)
Dept: RADIATION ONCOLOGY | Facility: HOSPITAL | Age: 73
End: 2018-03-27

## 2018-03-27 PROCEDURE — 77385: CPT | Performed by: RADIOLOGY

## 2018-03-28 ENCOUNTER — HOSPITAL ENCOUNTER (OUTPATIENT)
Dept: RADIATION ONCOLOGY | Facility: HOSPITAL | Age: 73
Setting detail: RADIATION/ONCOLOGY SERIES
Discharge: HOME | End: 2018-03-28

## 2018-03-28 PROCEDURE — 77385: CPT | Performed by: RADIOLOGY

## 2018-03-29 ENCOUNTER — HOSPITAL ENCOUNTER (OUTPATIENT)
Dept: RADIATION ONCOLOGY | Facility: HOSPITAL | Age: 73
Setting detail: RADIATION/ONCOLOGY SERIES
Discharge: HOME | End: 2018-03-29

## 2018-03-29 PROCEDURE — 77336 RADIATION PHYSICS CONSULT: CPT | Performed by: RADIOLOGY

## 2018-03-29 PROCEDURE — 77385: CPT | Performed by: RADIOLOGY

## 2018-03-30 ENCOUNTER — HOSPITAL ENCOUNTER (OUTPATIENT)
Dept: RADIATION ONCOLOGY | Facility: HOSPITAL | Age: 73
Setting detail: RADIATION/ONCOLOGY SERIES
Discharge: HOME | End: 2018-03-30

## 2018-03-30 PROCEDURE — 77385: CPT | Performed by: RADIOLOGY

## 2018-04-02 ENCOUNTER — HOSPITAL ENCOUNTER (OUTPATIENT)
Dept: RADIATION ONCOLOGY | Facility: HOSPITAL | Age: 73
Setting detail: RADIATION/ONCOLOGY SERIES
Discharge: HOME | End: 2018-04-02

## 2018-04-02 ENCOUNTER — HOSPITAL ENCOUNTER (OUTPATIENT)
Dept: RADIATION ONCOLOGY | Facility: HOSPITAL | Age: 73
Setting detail: RADIATION/ONCOLOGY SERIES
End: 2018-04-02

## 2018-04-02 PROCEDURE — 77385: CPT | Performed by: RADIOLOGY

## 2018-04-03 ENCOUNTER — HOSPITAL ENCOUNTER (OUTPATIENT)
Dept: RADIATION ONCOLOGY | Facility: HOSPITAL | Age: 73
Setting detail: RADIATION/ONCOLOGY SERIES
Discharge: HOME | End: 2018-04-03

## 2018-04-03 PROCEDURE — 77385: CPT | Performed by: RADIOLOGY

## 2018-04-04 ENCOUNTER — HOSPITAL ENCOUNTER (OUTPATIENT)
Dept: RADIATION ONCOLOGY | Facility: HOSPITAL | Age: 73
Setting detail: RADIATION/ONCOLOGY SERIES
Discharge: HOME | End: 2018-04-04

## 2018-04-04 PROCEDURE — 77385: CPT | Performed by: RADIOLOGY

## 2018-04-05 ENCOUNTER — HOSPITAL ENCOUNTER (OUTPATIENT)
Dept: RADIATION ONCOLOGY | Facility: HOSPITAL | Age: 73
Setting detail: RADIATION/ONCOLOGY SERIES
Discharge: HOME | End: 2018-04-05

## 2018-04-05 PROCEDURE — 77385: CPT | Performed by: RADIOLOGY

## 2018-04-05 PROCEDURE — 77336 RADIATION PHYSICS CONSULT: CPT | Performed by: RADIOLOGY

## 2018-04-06 ENCOUNTER — HOSPITAL ENCOUNTER (OUTPATIENT)
Dept: RADIATION ONCOLOGY | Facility: HOSPITAL | Age: 73
Setting detail: RADIATION/ONCOLOGY SERIES
Discharge: HOME | End: 2018-04-06

## 2018-04-06 PROCEDURE — 77385: CPT | Performed by: RADIOLOGY

## 2018-04-09 ENCOUNTER — HOSPITAL ENCOUNTER (OUTPATIENT)
Dept: RADIATION ONCOLOGY | Facility: HOSPITAL | Age: 73
Setting detail: RADIATION/ONCOLOGY SERIES
Discharge: HOME | End: 2018-04-09

## 2018-04-09 PROCEDURE — 77385: CPT | Performed by: RADIOLOGY

## 2018-04-10 ENCOUNTER — HOSPITAL ENCOUNTER (OUTPATIENT)
Dept: RADIATION ONCOLOGY | Facility: HOSPITAL | Age: 73
Setting detail: RADIATION/ONCOLOGY SERIES
Discharge: HOME | End: 2018-04-10

## 2018-04-10 PROCEDURE — 77385: CPT | Performed by: RADIOLOGY

## 2018-04-11 ENCOUNTER — HOSPITAL ENCOUNTER (OUTPATIENT)
Dept: RADIATION ONCOLOGY | Facility: HOSPITAL | Age: 73
Setting detail: RADIATION/ONCOLOGY SERIES
Discharge: HOME | End: 2018-04-11

## 2018-04-11 PROCEDURE — 77385: CPT | Performed by: RADIOLOGY

## 2018-04-12 ENCOUNTER — HOSPITAL ENCOUNTER (OUTPATIENT)
Dept: RADIATION ONCOLOGY | Facility: HOSPITAL | Age: 73
Setting detail: RADIATION/ONCOLOGY SERIES
Discharge: HOME | End: 2018-04-12

## 2018-04-12 PROCEDURE — 77336 RADIATION PHYSICS CONSULT: CPT | Performed by: RADIOLOGY

## 2018-04-12 PROCEDURE — 77385: CPT | Performed by: RADIOLOGY

## 2018-04-13 ENCOUNTER — APPOINTMENT (OUTPATIENT)
Dept: RADIATION ONCOLOGY | Facility: HOSPITAL | Age: 73
End: 2018-04-13

## 2018-04-13 PROCEDURE — 77385: CPT | Performed by: RADIOLOGY

## 2018-04-16 ENCOUNTER — HOSPITAL ENCOUNTER (OUTPATIENT)
Dept: RADIATION ONCOLOGY | Facility: HOSPITAL | Age: 73
Setting detail: RADIATION/ONCOLOGY SERIES
Discharge: HOME | End: 2018-04-16

## 2018-04-16 PROCEDURE — 77385: CPT | Performed by: RADIOLOGY

## 2018-04-17 ENCOUNTER — HOSPITAL ENCOUNTER (OUTPATIENT)
Dept: RADIATION ONCOLOGY | Facility: HOSPITAL | Age: 73
Setting detail: RADIATION/ONCOLOGY SERIES
Discharge: HOME | End: 2018-04-17

## 2018-04-17 PROCEDURE — 77385: CPT | Performed by: RADIOLOGY

## 2018-04-18 ENCOUNTER — HOSPITAL ENCOUNTER (OUTPATIENT)
Dept: RADIATION ONCOLOGY | Facility: HOSPITAL | Age: 73
Setting detail: RADIATION/ONCOLOGY SERIES
Discharge: HOME | End: 2018-04-18

## 2018-04-18 PROCEDURE — 77385: CPT | Performed by: RADIOLOGY

## 2018-04-19 ENCOUNTER — APPOINTMENT (OUTPATIENT)
Dept: RADIATION ONCOLOGY | Facility: HOSPITAL | Age: 73
End: 2018-04-19

## 2018-04-19 PROCEDURE — 77336 RADIATION PHYSICS CONSULT: CPT | Performed by: RADIOLOGY

## 2018-04-19 PROCEDURE — 77385: CPT | Performed by: RADIOLOGY

## 2018-04-20 ENCOUNTER — HOSPITAL ENCOUNTER (OUTPATIENT)
Dept: RADIATION ONCOLOGY | Facility: HOSPITAL | Age: 73
Setting detail: RADIATION/ONCOLOGY SERIES
Discharge: HOME | End: 2018-04-20

## 2018-04-20 PROCEDURE — 77385: CPT | Performed by: RADIOLOGY

## 2018-04-23 ENCOUNTER — HOSPITAL ENCOUNTER (OUTPATIENT)
Dept: RADIATION ONCOLOGY | Facility: HOSPITAL | Age: 73
Setting detail: RADIATION/ONCOLOGY SERIES
Discharge: HOME | End: 2018-04-23

## 2018-04-23 PROCEDURE — 77385: CPT | Performed by: RADIOLOGY

## 2018-04-24 ENCOUNTER — HOSPITAL ENCOUNTER (OUTPATIENT)
Dept: RADIATION ONCOLOGY | Facility: HOSPITAL | Age: 73
Setting detail: RADIATION/ONCOLOGY SERIES
Discharge: HOME | End: 2018-04-24

## 2018-04-24 PROCEDURE — 77300 RADIATION THERAPY DOSE PLAN: CPT | Performed by: RADIOLOGY

## 2018-04-24 PROCEDURE — 77385: CPT | Performed by: RADIOLOGY

## 2018-04-25 ENCOUNTER — HOSPITAL ENCOUNTER (OUTPATIENT)
Dept: RADIATION ONCOLOGY | Facility: HOSPITAL | Age: 73
Setting detail: RADIATION/ONCOLOGY SERIES
Discharge: HOME | End: 2018-04-25

## 2018-04-25 PROCEDURE — 77385: CPT | Performed by: RADIOLOGY

## 2018-04-26 ENCOUNTER — HOSPITAL ENCOUNTER (OUTPATIENT)
Dept: RADIATION ONCOLOGY | Facility: HOSPITAL | Age: 73
Setting detail: RADIATION/ONCOLOGY SERIES
Discharge: HOME | End: 2018-04-26

## 2018-04-26 PROCEDURE — 77385: CPT | Performed by: RADIOLOGY

## 2018-04-27 ENCOUNTER — HOSPITAL ENCOUNTER (OUTPATIENT)
Dept: RADIATION ONCOLOGY | Facility: HOSPITAL | Age: 73
Setting detail: RADIATION/ONCOLOGY SERIES
Discharge: HOME | End: 2018-04-27

## 2018-04-27 PROCEDURE — 77336 RADIATION PHYSICS CONSULT: CPT | Performed by: RADIOLOGY

## 2018-04-27 PROCEDURE — 77385: CPT | Performed by: RADIOLOGY

## 2018-04-30 ENCOUNTER — HOSPITAL ENCOUNTER (OUTPATIENT)
Dept: RADIATION ONCOLOGY | Facility: HOSPITAL | Age: 73
Setting detail: RADIATION/ONCOLOGY SERIES
Discharge: HOME | End: 2018-04-30

## 2018-04-30 PROCEDURE — 77385: CPT | Performed by: RADIOLOGY

## 2018-05-01 ENCOUNTER — HOSPITAL ENCOUNTER (OUTPATIENT)
Dept: RADIATION ONCOLOGY | Facility: HOSPITAL | Age: 73
Setting detail: RADIATION/ONCOLOGY SERIES
Discharge: HOME | End: 2018-05-01

## 2018-05-01 ENCOUNTER — HOSPITAL ENCOUNTER (OUTPATIENT)
Dept: RADIATION ONCOLOGY | Facility: HOSPITAL | Age: 73
Setting detail: RADIATION/ONCOLOGY SERIES
End: 2018-05-01

## 2018-05-01 PROCEDURE — 77385: CPT | Performed by: RADIOLOGY

## 2018-05-01 PROCEDURE — 77300 RADIATION THERAPY DOSE PLAN: CPT | Performed by: RADIOLOGY

## 2018-05-02 ENCOUNTER — HOSPITAL ENCOUNTER (OUTPATIENT)
Dept: RADIATION ONCOLOGY | Facility: HOSPITAL | Age: 73
Setting detail: RADIATION/ONCOLOGY SERIES
Discharge: HOME | End: 2018-05-02

## 2018-05-02 PROCEDURE — 77385: CPT | Performed by: RADIOLOGY

## 2018-05-03 ENCOUNTER — HOSPITAL ENCOUNTER (OUTPATIENT)
Dept: RADIATION ONCOLOGY | Facility: HOSPITAL | Age: 73
Setting detail: RADIATION/ONCOLOGY SERIES
Discharge: HOME | End: 2018-05-03

## 2018-05-03 PROCEDURE — 77336 RADIATION PHYSICS CONSULT: CPT | Performed by: RADIOLOGY

## 2018-05-03 PROCEDURE — 77385: CPT | Performed by: RADIOLOGY

## 2018-05-04 ENCOUNTER — HOSPITAL ENCOUNTER (OUTPATIENT)
Dept: RADIATION ONCOLOGY | Facility: HOSPITAL | Age: 73
Setting detail: RADIATION/ONCOLOGY SERIES
Discharge: HOME | End: 2018-05-04

## 2018-05-04 PROCEDURE — 77385: CPT | Performed by: RADIOLOGY

## 2018-05-07 ENCOUNTER — HOSPITAL ENCOUNTER (OUTPATIENT)
Dept: RADIATION ONCOLOGY | Facility: HOSPITAL | Age: 73
Setting detail: RADIATION/ONCOLOGY SERIES
Discharge: HOME | End: 2018-05-07

## 2018-05-07 PROCEDURE — 77385: CPT | Performed by: RADIOLOGY

## 2018-05-08 ENCOUNTER — HOSPITAL ENCOUNTER (OUTPATIENT)
Dept: RADIATION ONCOLOGY | Facility: HOSPITAL | Age: 73
Setting detail: RADIATION/ONCOLOGY SERIES
Discharge: HOME | End: 2018-05-08

## 2018-05-08 PROCEDURE — 77385: CPT | Performed by: RADIOLOGY

## 2018-05-09 ENCOUNTER — APPOINTMENT (OUTPATIENT)
Dept: RADIATION ONCOLOGY | Facility: HOSPITAL | Age: 73
End: 2018-05-09

## 2018-05-09 PROCEDURE — 77385: CPT | Performed by: RADIOLOGY

## 2018-05-10 ENCOUNTER — HOSPITAL ENCOUNTER (OUTPATIENT)
Dept: RADIATION ONCOLOGY | Facility: HOSPITAL | Age: 73
Setting detail: RADIATION/ONCOLOGY SERIES
Discharge: HOME | End: 2018-05-10

## 2018-05-10 PROCEDURE — 77385: CPT | Performed by: RADIOLOGY

## 2018-05-10 PROCEDURE — 77336 RADIATION PHYSICS CONSULT: CPT | Performed by: RADIOLOGY

## 2018-05-11 ENCOUNTER — HOSPITAL ENCOUNTER (OUTPATIENT)
Dept: RADIATION ONCOLOGY | Facility: HOSPITAL | Age: 73
Setting detail: RADIATION/ONCOLOGY SERIES
Discharge: HOME | End: 2018-05-11

## 2018-05-11 PROCEDURE — 77385: CPT | Performed by: RADIOLOGY

## 2018-05-14 ENCOUNTER — HOSPITAL ENCOUNTER (OUTPATIENT)
Dept: RADIATION ONCOLOGY | Facility: HOSPITAL | Age: 73
Setting detail: RADIATION/ONCOLOGY SERIES
Discharge: HOME | End: 2018-05-14

## 2018-05-14 PROCEDURE — 77385: CPT | Performed by: RADIOLOGY

## 2018-05-15 ENCOUNTER — HOSPITAL ENCOUNTER (OUTPATIENT)
Dept: RADIATION ONCOLOGY | Facility: HOSPITAL | Age: 73
Setting detail: RADIATION/ONCOLOGY SERIES
Discharge: HOME | End: 2018-05-15

## 2018-05-15 PROCEDURE — 77385: CPT | Performed by: RADIOLOGY

## 2018-05-16 ENCOUNTER — HOSPITAL ENCOUNTER (OUTPATIENT)
Dept: RADIATION ONCOLOGY | Facility: HOSPITAL | Age: 73
Setting detail: RADIATION/ONCOLOGY SERIES
Discharge: HOME | End: 2018-05-16

## 2018-05-16 PROCEDURE — 77385: CPT | Performed by: RADIOLOGY

## 2018-05-30 ENCOUNTER — NURSE ONLY (OUTPATIENT)
Dept: UROLOGY | Age: 73
End: 2018-05-30
Payer: MEDICARE

## 2018-05-30 VITALS — TEMPERATURE: 96.3 F

## 2018-05-30 DIAGNOSIS — C61 PROSTATE CANCER (HCC): Primary | ICD-10-CM

## 2018-05-30 PROCEDURE — 96402 CHEMO HORMON ANTINEOPL SQ/IM: CPT | Performed by: UROLOGY

## 2018-05-30 PROCEDURE — 99999 PR OFFICE/OUTPT VISIT,PROCEDURE ONLY: CPT | Performed by: UROLOGY

## 2018-06-29 PROBLEM — Z92.3 HISTORY OF RADIATION THERAPY: Status: ACTIVE | Noted: 2018-06-29

## 2018-06-29 PROBLEM — Z85.46 ENCOUNTER FOR FOLLOW-UP SURVEILLANCE OF PROSTATE CANCER: Status: ACTIVE | Noted: 2018-06-29

## 2018-06-29 PROBLEM — Z08 ENCOUNTER FOR FOLLOW-UP SURVEILLANCE OF PROSTATE CANCER: Status: ACTIVE | Noted: 2018-06-29

## 2018-07-02 ENCOUNTER — HOSPITAL ENCOUNTER (OUTPATIENT)
Dept: RADIATION ONCOLOGY | Facility: HOSPITAL | Age: 73
Setting detail: RADIATION/ONCOLOGY SERIES
End: 2018-07-02

## 2020-02-04 ENCOUNTER — CLINICAL SUPPORT (OUTPATIENT)
Dept: INTERNAL MEDICINE | Facility: CLINIC | Age: 75
End: 2020-02-04

## 2020-02-04 DIAGNOSIS — D51.0 PERNICIOUS ANEMIA: Primary | ICD-10-CM

## 2020-02-04 PROCEDURE — 96372 THER/PROPH/DIAG INJ SC/IM: CPT | Performed by: INTERNAL MEDICINE

## 2020-02-04 RX ORDER — CYANOCOBALAMIN 1000 UG/ML
1000 INJECTION, SOLUTION INTRAMUSCULAR; SUBCUTANEOUS ONCE
Status: COMPLETED | OUTPATIENT
Start: 2020-02-04 | End: 2020-02-04

## 2020-02-04 RX ADMIN — CYANOCOBALAMIN 1000 MCG: 1000 INJECTION, SOLUTION INTRAMUSCULAR; SUBCUTANEOUS at 08:15

## 2020-02-18 RX ORDER — GLYBURIDE 5 MG/1
5 TABLET ORAL 2 TIMES DAILY WITH MEALS
Qty: 180 TABLET | Refills: 1 | Status: SHIPPED | OUTPATIENT
Start: 2020-02-18 | End: 2020-08-24 | Stop reason: SDUPTHER

## 2020-02-19 RX ORDER — ATORVASTATIN CALCIUM 40 MG/1
40 TABLET, FILM COATED ORAL DAILY
Qty: 90 TABLET | Refills: 1 | Status: SHIPPED | OUTPATIENT
Start: 2020-02-19 | End: 2020-08-17

## 2020-02-24 RX ORDER — ERGOCALCIFEROL 1.25 MG/1
50000 CAPSULE ORAL WEEKLY
Qty: 5 CAPSULE | Refills: 2 | Status: SHIPPED | OUTPATIENT
Start: 2020-02-24 | End: 2020-06-04

## 2020-03-02 ENCOUNTER — CLINICAL SUPPORT (OUTPATIENT)
Dept: INTERNAL MEDICINE | Facility: CLINIC | Age: 75
End: 2020-03-02

## 2020-03-02 DIAGNOSIS — D51.0 PERNICIOUS ANEMIA: Primary | ICD-10-CM

## 2020-03-02 PROCEDURE — 96372 THER/PROPH/DIAG INJ SC/IM: CPT | Performed by: INTERNAL MEDICINE

## 2020-03-02 RX ORDER — CYANOCOBALAMIN 1000 UG/ML
1000 INJECTION, SOLUTION INTRAMUSCULAR; SUBCUTANEOUS
Status: DISCONTINUED | OUTPATIENT
Start: 2020-03-02 | End: 2022-07-05

## 2020-03-02 RX ADMIN — CYANOCOBALAMIN 1000 MCG: 1000 INJECTION, SOLUTION INTRAMUSCULAR; SUBCUTANEOUS at 08:53

## 2020-05-21 ENCOUNTER — OFFICE VISIT (OUTPATIENT)
Dept: INTERNAL MEDICINE | Facility: CLINIC | Age: 75
End: 2020-05-21

## 2020-05-21 VITALS
WEIGHT: 196 LBS | SYSTOLIC BLOOD PRESSURE: 136 MMHG | OXYGEN SATURATION: 99 % | HEART RATE: 58 BPM | HEIGHT: 66 IN | DIASTOLIC BLOOD PRESSURE: 78 MMHG | TEMPERATURE: 97.6 F | BODY MASS INDEX: 31.5 KG/M2

## 2020-05-21 DIAGNOSIS — E11.65 TYPE 2 DIABETES MELLITUS WITH HYPERGLYCEMIA, WITHOUT LONG-TERM CURRENT USE OF INSULIN (HCC): Primary | ICD-10-CM

## 2020-05-21 DIAGNOSIS — E78.2 MIXED HYPERLIPIDEMIA: ICD-10-CM

## 2020-05-21 DIAGNOSIS — Z79.899 ENCOUNTER FOR LONG-TERM (CURRENT) DRUG USE: ICD-10-CM

## 2020-05-21 DIAGNOSIS — D51.0 PERNICIOUS ANEMIA: ICD-10-CM

## 2020-05-21 PROBLEM — Z83.71 FAMILY HISTORY OF COLONIC POLYPS: Status: ACTIVE | Noted: 2017-12-15

## 2020-05-21 PROBLEM — M75.122 COMPLETE TEAR OF LEFT ROTATOR CUFF: Status: ACTIVE | Noted: 2017-03-16

## 2020-05-21 PROBLEM — Z83.719 FAMILY HISTORY OF COLONIC POLYPS: Status: ACTIVE | Noted: 2017-12-15

## 2020-05-21 PROBLEM — D64.9 ANEMIA: Status: ACTIVE | Noted: 2017-11-02

## 2020-05-21 PROBLEM — R12 CHRONIC HEARTBURN: Status: ACTIVE | Noted: 2017-11-02

## 2020-05-21 LAB — HBA1C MFR BLD: 7.4 %

## 2020-05-21 PROCEDURE — 96372 THER/PROPH/DIAG INJ SC/IM: CPT | Performed by: NURSE PRACTITIONER

## 2020-05-21 PROCEDURE — 83036 HEMOGLOBIN GLYCOSYLATED A1C: CPT | Performed by: NURSE PRACTITIONER

## 2020-05-21 PROCEDURE — 99214 OFFICE O/P EST MOD 30 MIN: CPT | Performed by: NURSE PRACTITIONER

## 2020-05-21 RX ORDER — BLOOD-GLUCOSE METER
1 KIT MISCELLANEOUS 2 TIMES DAILY
Qty: 1 EACH | Refills: 0 | Status: SHIPPED | OUTPATIENT
Start: 2020-05-21 | End: 2020-08-24 | Stop reason: SDUPTHER

## 2020-05-21 RX ADMIN — CYANOCOBALAMIN 1000 MCG: 1000 INJECTION, SOLUTION INTRAMUSCULAR; SUBCUTANEOUS at 08:12

## 2020-05-21 NOTE — PROGRESS NOTES
Subjective   Michael Novak is a 74 y.o. male.   Chief Complaint   Patient presents with   • Diabetes     a1c-7.4       Mr. Novak is a 74 y.o. Male who presents to the clinic today for follow up on diabetes management. Last visit his A1C was 7.6 and diet adjustment was advised. His A1C was 7.4 today and reports having a harder time overcoming sweet and morning cereal routine. He had been increasing his exercise and had currently been working on completing a patio in his back yard. Yard work including pulling up weed and tree roots, shoveling and pick axing the ground to prepare the yard for post placement. He denies exercise intolerance, shortness of breath, hypertension, and peripheral edema. He states that he would like to try to work on his diet more and would like some diet information today. He does not check his blood sugars and is not on insulin therapy. He is on 2 oral diabetics and reports good compliance. He denies symptoms of hypoglycemia.     He is requesting a Vit B12 shot today due to pernicious anemia and having to use oral vitamin b12 therapy. He denies fatigue, shortness of breath, easy bruising or bleeding. He would like to proceed with vitamin B12 injections every other week.        The following portions of the patient's history were reviewed and updated as appropriate: allergies, current medications, past family history, past medical history, past social history, past surgical history and problem list.    Review of Systems   Constitutional: Negative for activity change, appetite change, fatigue, fever, unexpected weight gain and unexpected weight loss.   HENT: Positive for hearing loss. Negative for swollen glands, trouble swallowing and voice change.         Wearing hearing aids   Eyes: Negative for blurred vision and visual disturbance.   Respiratory: Negative for cough and shortness of breath.    Cardiovascular: Negative for chest pain, palpitations and leg swelling.    Gastrointestinal: Negative for abdominal pain, constipation, diarrhea, nausea, vomiting and indigestion.   Endocrine: Negative for cold intolerance, heat intolerance, polydipsia and polyphagia.   Genitourinary: Negative for dysuria and frequency.   Musculoskeletal: Positive for arthralgias. Negative for back pain, joint swelling and neck pain.        Right shoulder pain and right knee pain. Seeing Dr. Amor for injection therapy next month.   Skin: Negative for color change, rash and skin lesions.   Neurological: Negative for dizziness, weakness, headache, memory problem and confusion.   Hematological: Does not bruise/bleed easily.   Psychiatric/Behavioral: Negative for agitation, hallucinations and suicidal ideas. The patient is not nervous/anxious.        Objective   Past Medical History:   Diagnosis Date   • Back pain    • Coronary artery disease    • Diabetes mellitus (CMS/HCC)    • Hearing deficit    • Hypertension    • Prostate cancer (CMS/HCC)       Past Surgical History:   Procedure Laterality Date   • ROTATOR CUFF REPAIR Left 03/15/2017        Current Outpatient Medications:   •  aspirin 325 MG tablet, Take 325 mg by mouth Daily., Disp: , Rfl:   •  atorvastatin (LIPITOR) 40 MG tablet, Take 1 tablet by mouth Daily., Disp: 90 tablet, Rfl: 1  •  glyburide (DIABETA) 5 MG tablet, Take 1 tablet by mouth 2 (Two) Times a Day With Meals., Disp: 180 tablet, Rfl: 1  •  losartan (COZAAR) 50 MG tablet, Take 50 mg by mouth Daily., Disp: , Rfl:   •  metFORMIN (GLUCOPHAGE) 1000 MG tablet, Take 1,000 mg by mouth 2 (Two) Times a Day With Meals., Disp: , Rfl:   •  omeprazole (priLOSEC) 20 MG capsule, Take 20 mg by mouth Daily., Disp: , Rfl:   •  vitamin D (ERGOCALCIFEROL) 1.25 MG (38099 UT) capsule capsule, Take 1 capsule by mouth 1 (One) Time Per Week., Disp: 5 capsule, Rfl: 2  •  glucose blood test strip, Use as instructed, Disp: 100 each, Rfl: 12  •  glucose monitor monitoring kit, 1 each 2 (Two) Times a Day., Disp: 1  each, Rfl: 0  •  rosuvastatin (CRESTOR) 5 MG tablet, Take 5 mg by mouth Daily., Disp: , Rfl:     Current Facility-Administered Medications:   •  cyanocobalamin injection 1,000 mcg, 1,000 mcg, Intramuscular, Q28 Days, Paramjit Shi MD, 1,000 mcg at 05/21/20 0812     Vitals:    05/21/20 0804   BP: 136/78   Pulse: 58   Temp: 97.6 °F (36.4 °C)   SpO2: 99%         05/21/20  0804   Weight: 88.9 kg (196 lb)     Patient's Body mass index is 31.64 kg/m². BMI is above normal parameters. Recommendations include: educational material, exercise counseling and nutrition counseling.      Physical Exam   Constitutional: He is oriented to person, place, and time. He appears well-developed and well-nourished.   HENT:   Head: Normocephalic and atraumatic.   Right Ear: External ear normal.   Left Ear: External ear normal.   Mouth/Throat: Oropharynx is clear and moist.   Eyes: Pupils are equal, round, and reactive to light. Conjunctivae and EOM are normal.   Neck: Normal range of motion. Neck supple. No thyromegaly present.   Cardiovascular: Normal rate, regular rhythm, normal heart sounds and intact distal pulses.   Pulmonary/Chest: Effort normal and breath sounds normal.   Abdominal: Soft. Bowel sounds are normal.   Lymphadenopathy:     He has no cervical adenopathy.   Neurological: He is alert and oriented to person, place, and time.   Skin: Skin is warm and dry.   Psychiatric: He has a normal mood and affect. His behavior is normal. Thought content normal.   Nursing note and vitals reviewed.            Assessment/Plan   Diagnoses and all orders for this visit:    1. Type 2 diabetes mellitus with hyperglycemia, without long-term current use of insulin (CMS/[facility]) (Primary)  -     POC Glycosylated Hemoglobin (Hb A1C)  -     glucose monitor monitoring kit; 1 each 2 (Two) Times a Day.  Dispense: 1 each; Refill: 0  -     glucose blood test strip; Use as instructed  Dispense: 100 each; Refill: 12    2. Pernicious anemia    3. Mixed  hyperlipidemia  -     Lipid Panel    4. Encounter for long-term (current) drug use  -     AST; Future  -     ALT      Will continue b12 injections. Patient's has not been checking his blood sugars and advised him to start now BID. ADA diet and exercise recommendations were reviewed at our visit today. He was discharged with a ADA diet guideline booklet. He will return next visit with blood sugar logs and discuss his overall adherence to diet regimen. Goal for patient is to be less than 7 at next visit. Will check lipid and liver function today related to long term statin use.

## 2020-05-21 NOTE — PATIENT INSTRUCTIONS

## 2020-05-22 LAB
ALT SERPL-CCNC: 26 U/L (ref 1–41)
CHOLEST SERPL-MCNC: 168 MG/DL (ref 0–200)
HDLC SERPL-MCNC: 52 MG/DL (ref 40–60)
LDLC SERPL CALC-MCNC: 84 MG/DL (ref 0–100)
TRIGL SERPL-MCNC: 160 MG/DL (ref 0–150)
VLDLC SERPL CALC-MCNC: 32 MG/DL (ref 5–40)

## 2020-05-26 ENCOUNTER — RESULTS ENCOUNTER (OUTPATIENT)
Dept: INTERNAL MEDICINE | Facility: CLINIC | Age: 75
End: 2020-05-26

## 2020-05-26 DIAGNOSIS — E11.65 TYPE 2 DIABETES MELLITUS WITH HYPERGLYCEMIA, WITHOUT LONG-TERM CURRENT USE OF INSULIN (HCC): Primary | ICD-10-CM

## 2020-05-26 DIAGNOSIS — Z79.899 ENCOUNTER FOR LONG-TERM (CURRENT) DRUG USE: ICD-10-CM

## 2020-06-04 RX ORDER — ERGOCALCIFEROL 1.25 MG/1
CAPSULE ORAL
Qty: 5 CAPSULE | Refills: 0 | Status: SHIPPED | OUTPATIENT
Start: 2020-06-04 | End: 2020-07-09 | Stop reason: SDUPTHER

## 2020-06-05 RX ORDER — LOSARTAN POTASSIUM 50 MG/1
50 TABLET ORAL DAILY
Qty: 30 TABLET | Refills: 11 | Status: SHIPPED | OUTPATIENT
Start: 2020-06-05 | End: 2021-08-27 | Stop reason: SDUPTHER

## 2020-06-05 NOTE — TELEPHONE ENCOUNTER
PATIENT CALLED REQUESTING A REFILL ON  - losartan (COZAAR) 50 MG tablet    PHARMACY: [place] [address] [address] [facility] - [phone] [facility] [phone]   [phone]    PATIENT'S CALLBACK NUMBER: [phone]

## 2020-06-30 ENCOUNTER — CLINICAL SUPPORT (OUTPATIENT)
Dept: INTERNAL MEDICINE | Facility: CLINIC | Age: 75
End: 2020-06-30

## 2020-06-30 DIAGNOSIS — D51.0 PERNICIOUS ANEMIA: ICD-10-CM

## 2020-06-30 PROCEDURE — 96372 THER/PROPH/DIAG INJ SC/IM: CPT | Performed by: INTERNAL MEDICINE

## 2020-06-30 RX ADMIN — CYANOCOBALAMIN 1000 MCG: 1000 INJECTION, SOLUTION INTRAMUSCULAR; SUBCUTANEOUS at 08:53

## 2020-07-09 RX ORDER — ERGOCALCIFEROL 1.25 MG/1
50000 CAPSULE ORAL WEEKLY
Qty: 5 CAPSULE | Refills: 11 | Status: SHIPPED | OUTPATIENT
Start: 2020-07-09 | End: 2021-07-29

## 2020-07-29 ENCOUNTER — CLINICAL SUPPORT (OUTPATIENT)
Dept: INTERNAL MEDICINE | Facility: CLINIC | Age: 75
End: 2020-07-29

## 2020-07-29 DIAGNOSIS — D51.0 PERNICIOUS ANEMIA: ICD-10-CM

## 2020-07-29 PROCEDURE — 96372 THER/PROPH/DIAG INJ SC/IM: CPT | Performed by: NURSE PRACTITIONER

## 2020-07-29 RX ADMIN — CYANOCOBALAMIN 1000 MCG: 1000 INJECTION, SOLUTION INTRAMUSCULAR; SUBCUTANEOUS at 09:30

## 2020-08-17 RX ORDER — ATORVASTATIN CALCIUM 40 MG/1
TABLET, FILM COATED ORAL
Qty: 90 TABLET | Refills: 3 | Status: SHIPPED | OUTPATIENT
Start: 2020-08-17 | End: 2021-08-09

## 2020-08-24 ENCOUNTER — OFFICE VISIT (OUTPATIENT)
Dept: INTERNAL MEDICINE | Facility: CLINIC | Age: 75
End: 2020-08-24

## 2020-08-24 VITALS
DIASTOLIC BLOOD PRESSURE: 80 MMHG | HEART RATE: 65 BPM | RESPIRATION RATE: 16 BRPM | TEMPERATURE: 98.2 F | BODY MASS INDEX: 31.88 KG/M2 | SYSTOLIC BLOOD PRESSURE: 128 MMHG | OXYGEN SATURATION: 95 % | HEIGHT: 66 IN | WEIGHT: 198.38 LBS

## 2020-08-24 DIAGNOSIS — R53.82 CHRONIC FATIGUE: ICD-10-CM

## 2020-08-24 DIAGNOSIS — I25.10 CORONARY ARTERY DISEASE INVOLVING NATIVE CORONARY ARTERY OF NATIVE HEART WITHOUT ANGINA PECTORIS: ICD-10-CM

## 2020-08-24 DIAGNOSIS — E11.65 TYPE 2 DIABETES MELLITUS WITH HYPERGLYCEMIA, WITHOUT LONG-TERM CURRENT USE OF INSULIN (HCC): ICD-10-CM

## 2020-08-24 DIAGNOSIS — E78.2 MIXED HYPERLIPIDEMIA: ICD-10-CM

## 2020-08-24 DIAGNOSIS — M25.552 HIP PAIN, BILATERAL: ICD-10-CM

## 2020-08-24 DIAGNOSIS — E11.65 TYPE 2 DIABETES MELLITUS WITH HYPERGLYCEMIA, UNSPECIFIED WHETHER LONG TERM INSULIN USE (HCC): Primary | ICD-10-CM

## 2020-08-24 DIAGNOSIS — M25.551 HIP PAIN, BILATERAL: ICD-10-CM

## 2020-08-24 PROBLEM — E55.9 VITAMIN D DEFICIENCY: Status: ACTIVE | Noted: 2020-08-24

## 2020-08-24 PROBLEM — R05.8 ACE-INHIBITOR COUGH: Status: ACTIVE | Noted: 2020-08-24

## 2020-08-24 PROBLEM — R42 DIZZINESS: Status: ACTIVE | Noted: 2020-08-24

## 2020-08-24 PROBLEM — M54.9 BACK PAIN: Status: ACTIVE | Noted: 2020-08-24

## 2020-08-24 PROBLEM — M25.569 KNEE PAIN: Status: ACTIVE | Noted: 2020-08-24

## 2020-08-24 PROBLEM — M25.511 ARTHRALGIA OF RIGHT SHOULDER REGION: Status: ACTIVE | Noted: 2020-08-24

## 2020-08-24 PROBLEM — K21.9 GASTROESOPHAGEAL REFLUX DISEASE WITHOUT ESOPHAGITIS: Status: ACTIVE | Noted: 2020-08-24

## 2020-08-24 PROBLEM — Z91.81 AT LOW RISK FOR FALL: Status: ACTIVE | Noted: 2020-08-24

## 2020-08-24 PROBLEM — Z13.31 NEGATIVE DEPRESSION SCREENING: Status: ACTIVE | Noted: 2020-08-24

## 2020-08-24 PROBLEM — R97.20 ELEVATED PSA: Status: ACTIVE | Noted: 2020-08-24

## 2020-08-24 PROBLEM — T46.4X5A ACE-INHIBITOR COUGH: Status: ACTIVE | Noted: 2020-08-24

## 2020-08-24 PROBLEM — R53.83 FATIGUE: Status: ACTIVE | Noted: 2020-08-24

## 2020-08-24 PROBLEM — N52.9 ERECTILE DYSFUNCTION OF ORGANIC ORIGIN: Status: ACTIVE | Noted: 2020-08-24

## 2020-08-24 PROBLEM — E78.5 ELEVATED LIPIDS: Status: ACTIVE | Noted: 2020-08-24

## 2020-08-24 LAB — HBA1C MFR BLD: 8.1 %

## 2020-08-24 PROCEDURE — 99214 OFFICE O/P EST MOD 30 MIN: CPT | Performed by: NURSE PRACTITIONER

## 2020-08-24 PROCEDURE — 83036 HEMOGLOBIN GLYCOSYLATED A1C: CPT | Performed by: NURSE PRACTITIONER

## 2020-08-24 RX ORDER — GLYBURIDE 5 MG/1
TABLET ORAL
Qty: 180 TABLET | Refills: 1 | Status: SHIPPED | OUTPATIENT
Start: 2020-08-24 | End: 2021-02-09

## 2020-08-24 RX ORDER — BLOOD-GLUCOSE METER
1 KIT MISCELLANEOUS 2 TIMES DAILY
Qty: 1 EACH | Refills: 0 | Status: SHIPPED | OUTPATIENT
Start: 2020-08-24

## 2020-08-24 NOTE — PROGRESS NOTES
"Subjective   Michael Novak is a 74 y.o. male.   Chief Complaint   Patient presents with   • Diabetes     3 month follow-up  A1C - 8.1       Mr. Novak presents to the office today for 3 month diabetic follow up. Patient reporting poor dietary control over the last few months. He reports that he is eating more crackers to help overcome his \"sweet tooth\". Patient reports not checking blood sugars related to having difficulty paying copayment for glucometer. Patient's A1C had worsened from 7.4 to 8.1. Patient reports good compliance with current medical therapy. He reports exercise includes working in his yard and completing [employer] projects for friends. He reports that his hips hurt him with excessive walking, greater than 1/2 mile without sitting. Patient reports his pain is managed with rest and occasional tylenol.    Patient reports that he would like to be set up to see a cardiologist again related to history of CAD and status post quadruple by-pass surgery. Patient's triglycerides and cholesterol are elevated. He reports compliance with Lipitor therapy, but having trouble with diet at this time. Patient's triglycerides are elevated related to poor glycemic control. Patient educated on diet at visit again today and recommending nutritional consultation. Patient does not want to increase Lipitor at this time. Patient denies chest pain, shortness of breath, lower extremity edema, or palpitations. Patient denies abnormal blood pressure readings at home.  Patient reports generalized fatigue, but most likely related to pernicous anemia. Patient is currently receiving Vitamin B12 injections.     Diabetes   He presents for his follow-up diabetic visit. He has type 2 diabetes mellitus. His disease course has been worsening. There are no hypoglycemic associated symptoms. Pertinent negatives for hypoglycemia include no confusion, dizziness or nervousness/anxiousness. Associated symptoms include fatigue. " Pertinent negatives for diabetes include no blurred vision, no chest pain, no polydipsia, no polyphagia, no weakness and no weight loss. There are no hypoglycemic complications. Symptoms are improving. Diabetic complications include heart disease. Risk factors for coronary artery disease include hypertension, male sex, obesity, sedentary lifestyle, dyslipidemia and diabetes mellitus. Current diabetic treatment includes diet and oral agent (dual therapy). He is compliant with treatment all of the time. His weight is fluctuating minimally. He is following a generally unhealthy diet. Meal planning includes avoidance of concentrated sweets. He has not had a previous visit with a dietitian. He participates in exercise intermittently. Home blood sugar record trend: had not checked. An ACE inhibitor/angiotensin II receptor blocker is contraindicated (cough). He does not see a podiatrist.Eye exam is not current.        The following portions of the patient's history were reviewed and updated as appropriate: allergies, current medications, past family history, past medical history, past social history, past surgical history and problem list.    Review of Systems   Constitutional: Positive for fatigue. Negative for activity change, appetite change, fever, unexpected weight gain and unexpected weight loss.   HENT: Negative for swollen glands, trouble swallowing and voice change.    Eyes: Negative for blurred vision and visual disturbance.   Respiratory: Negative for cough and shortness of breath.    Cardiovascular: Negative for chest pain, palpitations and leg swelling.   Gastrointestinal: Negative for abdominal pain, constipation, diarrhea, nausea, vomiting and indigestion.   Endocrine: Negative for cold intolerance, heat intolerance, polydipsia and polyphagia.   Genitourinary: Negative for dysuria and frequency.   Musculoskeletal: Positive for arthralgias. Negative for back pain, joint swelling and neck pain.   Skin:  Negative for color change, rash and skin lesions.   Neurological: Negative for dizziness, weakness, headache, memory problem and confusion.   Hematological: Does not bruise/bleed easily.   Psychiatric/Behavioral: Negative for agitation, hallucinations and suicidal ideas. The patient is not nervous/anxious.        Objective   Past Medical History:   Diagnosis Date   • Back pain    • Coronary artery disease    • Diabetes mellitus (CMS/HCC)    • Hearing deficit    • Hypertension    • Prostate cancer (CMS/HCC)       Past Surgical History:   Procedure Laterality Date   • CORONARY ARTERY BYPASS GRAFT     • ROTATOR CUFF REPAIR Left 03/15/2017        Current Outpatient Medications:   •  aspirin 325 MG tablet, Take 325 mg by mouth Daily., Disp: , Rfl:   •  atorvastatin (LIPITOR) 40 MG tablet, Take 1 tablet by mouth once daily, Disp: 90 tablet, Rfl: 3  •  glucose blood (OneTouch Verio) test strip, 1 each by Other route 2 (Two) Times a Day. Use as instructed, Disp: 100 each, Rfl: 5  •  glucose monitor monitoring kit, 1 each 2 (Two) Times a Day., Disp: 1 each, Rfl: 0  •  glyburide (Diabeta) 5 MG tablet, Take 2 tablets before breakfast and 1 tablet before dinner., Disp: 180 tablet, Rfl: 1  •  losartan (COZAAR) 50 MG tablet, Take 1 tablet by mouth Daily., Disp: 30 tablet, Rfl: 11  •  metFORMIN (GLUCOPHAGE) 1000 MG tablet, Take 1,000 mg by mouth 2 (Two) Times a Day With Meals., Disp: , Rfl:   •  Multiple Vitamins-Minerals (MULTIVITAMIN MEN) tablet, Take 1 tablet by mouth Daily., Disp: , Rfl:   •  omeprazole (priLOSEC) 20 MG capsule, Take 20 mg by mouth Daily., Disp: , Rfl:   •  vitamin D (ERGOCALCIFEROL) 1.25 MG (43526 UT) capsule capsule, Take 1 capsule by mouth 1 (One) Time Per Week., Disp: 5 capsule, Rfl: 11    Current Facility-Administered Medications:   •  cyanocobalamin injection 1,000 mcg, 1,000 mcg, Intramuscular, Q28 Days, Paramjit Shi MD, 1,000 mcg at 07/29/20 0930     Vitals:    08/24/20 0903   BP: 128/80   Pulse:  65   Resp: 16   Temp: 98.2 °F (36.8 °C)   SpO2: 95%         08/24/20  0903   Weight: 90 kg (198 lb 6 oz)     Patient's Body mass index is 32.02 kg/m². BMI is above normal parameters. Recommendations include: educational material, exercise counseling, nutrition counseling and referral to a nutritionist.      Physical Exam   Constitutional: He is oriented to person, place, and time. He appears well-developed and well-nourished.   HENT:   Head: Normocephalic and atraumatic.   Right Ear: Hearing, tympanic membrane, external ear and ear canal normal.   Left Ear: Hearing, tympanic membrane, external ear and ear canal normal.   Nose: Nose normal.   Mouth/Throat: Uvula is midline and oropharynx is clear and moist.   Eyes: Pupils are equal, round, and reactive to light. Conjunctivae and EOM are normal.   Neck: Normal range of motion. Neck supple. No thyromegaly present.   Cardiovascular: Normal rate, regular rhythm, normal heart sounds and intact distal pulses.   Pulmonary/Chest: Effort normal and breath sounds normal.   Abdominal: Soft. Bowel sounds are normal. There is no tenderness.   Musculoskeletal:        Right hip: He exhibits crepitus. He exhibits normal range of motion and no tenderness.        Left hip: He exhibits tenderness and crepitus. He exhibits normal range of motion.   Lymphadenopathy:     He has no cervical adenopathy.   Neurological: He is alert and oriented to person, place, and time. He has normal strength. He displays a negative Romberg sign.   Skin: Skin is warm and dry. No lesion and no rash noted.   Psychiatric: He has a normal mood and affect. His speech is normal and behavior is normal. Thought content normal.   Nursing note and vitals reviewed.        Assessment/Plan   Diagnoses and all orders for this visit:    1. Type 2 diabetes mellitus with hyperglycemia, unspecified whether long term insulin use (CMS/[facility]) (Primary)  -     POC Glycosylated Hemoglobin (Hb A1C)    2. Type 2 diabetes mellitus  with hyperglycemia, without long-term current use of insulin (CMS/[facility])  -     glucose monitor monitoring kit; 1 each 2 (Two) Times a Day.  Dispense: 1 each; Refill: 0  -     Ambulatory Referral to Nutrition Services  -     glyburide (Diabeta) 5 MG tablet; Take 2 tablets before breakfast and 1 tablet before dinner.  Dispense: 180 tablet; Refill: 1    3. Chronic fatigue    4. Hip pain, bilateral    Patient will start taking glyburide 10 mg in the AM 5 mg in the PM. Will resend order for glucometer and request pharmacy to work with patient's insurance to find cheapest option. Discussed the pro's and con's of not checking blood sugars and possibly need insulin next without better diet and blood sugar monitoring. Patient is agreeable to getting glucometer today. Patient will check blood sugar's BID. Patient will be referred to nutritional services at this time. Patient reports that hip pain is doing ok and does not want further work up at this time. Will continue OTC treatments. Advised him to stay away from NSAIDs. Patient requesting referral to Cardiologist to establish care with Dr. Adkins. Will send referral related to CAD s/p 4 stents.    Will check yearly labs prior to next follow up. Will have patient complete labs the week before follow up.

## 2020-09-01 ENCOUNTER — CLINICAL SUPPORT (OUTPATIENT)
Dept: INTERNAL MEDICINE | Facility: CLINIC | Age: 75
End: 2020-09-01

## 2020-09-01 DIAGNOSIS — D51.0 PERNICIOUS ANEMIA: ICD-10-CM

## 2020-09-01 PROCEDURE — 96372 THER/PROPH/DIAG INJ SC/IM: CPT | Performed by: NURSE PRACTITIONER

## 2020-09-01 RX ADMIN — CYANOCOBALAMIN 1000 MCG: 1000 INJECTION, SOLUTION INTRAMUSCULAR; SUBCUTANEOUS at 11:45

## 2020-09-15 ENCOUNTER — CLINICAL SUPPORT (OUTPATIENT)
Dept: INTERNAL MEDICINE | Facility: CLINIC | Age: 75
End: 2020-09-15

## 2020-09-15 DIAGNOSIS — Z23 NEED FOR INFLUENZA VACCINATION: Primary | ICD-10-CM

## 2020-09-15 DIAGNOSIS — D51.0 PERNICIOUS ANEMIA: ICD-10-CM

## 2020-09-15 PROCEDURE — 90686 IIV4 VACC NO PRSV 0.5 ML IM: CPT | Performed by: INTERNAL MEDICINE

## 2020-09-15 PROCEDURE — 96372 THER/PROPH/DIAG INJ SC/IM: CPT | Performed by: INTERNAL MEDICINE

## 2020-09-15 PROCEDURE — G0008 ADMIN INFLUENZA VIRUS VAC: HCPCS | Performed by: INTERNAL MEDICINE

## 2020-09-15 RX ADMIN — CYANOCOBALAMIN 1000 MCG: 1000 INJECTION, SOLUTION INTRAMUSCULAR; SUBCUTANEOUS at 08:31

## 2020-09-21 ENCOUNTER — OFFICE VISIT (OUTPATIENT)
Dept: CARDIOLOGY | Age: 75
End: 2020-09-21
Payer: MEDICARE

## 2020-09-21 VITALS
HEIGHT: 66 IN | SYSTOLIC BLOOD PRESSURE: 132 MMHG | WEIGHT: 196 LBS | BODY MASS INDEX: 31.5 KG/M2 | DIASTOLIC BLOOD PRESSURE: 62 MMHG

## 2020-09-21 PROCEDURE — 99204 OFFICE O/P NEW MOD 45 MIN: CPT | Performed by: INTERNAL MEDICINE

## 2020-09-21 PROCEDURE — 93000 ELECTROCARDIOGRAM COMPLETE: CPT | Performed by: INTERNAL MEDICINE

## 2020-09-21 ASSESSMENT — ENCOUNTER SYMPTOMS
EYES NEGATIVE: 1
RESPIRATORY NEGATIVE: 1
VOMITING: 0
DIARRHEA: 0
SHORTNESS OF BREATH: 0
NAUSEA: 0
GASTROINTESTINAL NEGATIVE: 1

## 2020-09-21 NOTE — PROGRESS NOTES
[facility] Progress Note                            Date:  9/21/2020  Patient: Clay Parry. Age:  75 y.o., 1945      Reason for evaluation:         SUBJECTIVE:    Seen today new patient just moved to the area his son lives here. History of coronary artery disease status post CABG times 4/2007. Moved from the [address] [place], [place] [id number] area. He complains of being weak and tired quite a bit. Denies anginal chest pain or limiting dyspnea. History of diabetes prior tobacco abuse hypertension. Has not undergone catheterization or stress testing since his bypass surgery. No history of arrhythmias or significant valve issues. Review of Systems   Constitutional: Negative. Negative for chills, fever and unexpected weight change. HENT: Negative. Eyes: Negative. Respiratory: Negative. Negative for shortness of breath. Cardiovascular: Negative. Negative for chest pain. Gastrointestinal: Negative. Negative for diarrhea, nausea and vomiting. Endocrine: Negative. Genitourinary: Negative. Musculoskeletal: Negative. Skin: Negative. Neurological: Negative. All other systems reviewed and are negative. OBJECTIVE:     /62   Ht 5' 6\" (1.676 m)   Wt 196 lb (88.9 kg)   BMI 31.64 kg/m²     Labs:   CBC: No results for input(s): WBC, HGB, HCT, PLT in the last 72 hours. BMP:No results for input(s): NA, K, CO2, BUN, CREATININE, LABGLOM, GLUCOSE in the last 72 hours. BNP: No results for input(s): BNP in the last 72 hours. PT/INR: No results for input(s): PROTIME, INR in the last 72 hours. APTT:No results for input(s): APTT in the last 72 hours. CARDIAC ENZYMES:No results for input(s): CKTOTAL, CKMB, CKMBINDEX, TROPONINI in the last 72 hours. FASTING LIPID PANEL:No results found for: HDL, LDLDIRECT, LDLCALC, TRIG  LIVER PROFILE:No results for input(s): AST, ALT, LABALBU in the last 72 hours.         Past Medical History:   Diagnosis Date    CAD (coronary artery disease)     Chronic back pain     Diverticulitis     Hip pain     right    Hyperlipidemia     Hypertension     Knee pain     Shoulder pain     Type II or unspecified type diabetes mellitus without mention of complication, not stated as uncontrolled      Past Surgical History:   Procedure Laterality Date    CORONARY ARTERY BYPASS GRAFT      MA COLONOSCOPY W/BIOPSY SINGLE/MULTIPLE N/A 12/15/2017    Dr Raul MUÑIZ (-) dysplasia x 1, HP x 1, mucosa--5 yr recall    MA EGD TRANSORAL BIOPSY SINGLE/MULTIPLE N/A 12/15/2017    Dr Ty Aldana (-)    SHOULDER ARTHROSCOPY Left 3/16/2017    SHOULDER ARTHROSCOPIC SUBACROMIAL DECOMPRESSION, DISTAL CLAVICLE RESECTION, GLENUHUMERAL SYNOVECTOMY, OPEN ROTATOR CUFF REPAIR AND OPEN BICEPS TENODESIS LEFT  performed by Grupo Shipman DO at [facility]      age 9     Family History   Problem Relation Age of Onset    Cancer Mother     Heart Disease Father     Colon Cancer Neg Hx     Colon Polyps Neg Hx     Liver Cancer Neg Hx     Liver Disease Neg Hx     Esophageal Cancer Neg Hx     Rectal Cancer Neg Hx     Stomach Cancer Neg Hx      Allergies   Allergen Reactions    Valium [Diazepam] Nausea Only     Current Outpatient Medications   Medication Sig Dispense Refill    omeprazole (PRILOSEC) 20 MG delayed release capsule Take 20 mg by mouth daily      Aspirin Buf,[id number]-[id number], (BUFFERED ASPIRIN) 325 MG TABS Take 1 tablet by mouth once      glyBURIDE (DIABETA) 5 MG tablet Take 5 mg by mouth 2 times daily (with meals)      losartan (COZAAR) 50 MG tablet Take 12.5 mg by mouth daily      atorvastatin (LIPITOR) 20 MG tablet Take 20 mg by mouth daily      Multiple Vitamins-Minerals (MULTIVITAMIN PO) Take  by mouth daily.  metFORMIN (GLUCOPHAGE) 500 MG tablet Take 500 mg by mouth 2 times daily (with meals). No current facility-administered medications for this visit.       Social History     Socioeconomic History    Marital status:      Spouse name: Not on file    Number of children: Not on file    Years of education: Not on file    Highest education level: Not on file   Occupational History    Not on file   Social Needs    Financial resource strain: Not on file    Food insecurity     Worry: Not on file     Inability: Not on file    Transportation needs     Medical: Not on file     Non-medical: Not on file   Tobacco Use    Smoking status: Former Smoker     Packs/day: 0.50     Years: 10.00     Pack years: 5.00     Types: Cigarettes     Last attempt to quit: 1974     Years since quittin.7    Smokeless tobacco: Never Used   Substance and Sexual Activity    Alcohol use: No    Drug use: No    Sexual activity: Not on file   Lifestyle    Physical activity     Days per week: Not on file     Minutes per session: Not on file    Stress: Not on file   Relationships    Social connections     Talks on phone: Not on file     Gets together: Not on file     Attends Hindu service: Not on file     Active member of club or organization: Not on file     Attends meetings of clubs or organizations: Not on file     Relationship status: Not on file    Intimate partner violence     Fear of current or ex partner: Not on file     Emotionally abused: Not on file     Physically abused: Not on file     Forced sexual activity: Not on file   Other Topics Concern    Not on file   Social History Narrative    Not on file       Physical Examination:  /62   Ht 5' 6\" (1.676 m)   Wt 196 lb (88.9 kg)   BMI 31.64 kg/m²   Physical Exam  Vitals signs reviewed. Constitutional:       General: He is not in acute distress. Appearance: Normal appearance. He is well-developed. He is obese. He is not ill-appearing, toxic-appearing or diaphoretic. HENT:      Head: Normocephalic and atraumatic. Nose: Nose normal.      Mouth/Throat:      Mouth: Mucous membranes are moist.   Eyes:      General: No scleral icterus.      Extraocular

## 2020-09-21 NOTE — PATIENT INSTRUCTIONS
[place] at the [facility] and [address] [facility] located on the first floor of [place] [address] through hospital main entrance and turn immediately to your left. Date: Mon Sep 28th arrive 7:45 A. M for 8:15 A. M     Lexiscan Stress Test      Lexiscan (regadenoson injection) is a prescription drug given through an IV line that increases blood flow through the arteries of the heart during a cardiac nuclear stress test.     There are two parts to a Lexiscan stress test: the rest portion and the exercise portion. For the rest portion, a radioactive tracer is injected into your arm through the IV. After 30 to 60 minutes, the process of imaging will begin. A nuclear camera will be placed on your chest area and images are taken for the next 15 to 20 minutes. For the exercise portion, a nurse will attach EKG electrodes to your chest to monitor your heart rate. The drug Jacques Marcelle is administered to simulate stress on the heart. Your heart rhythm will then be monitored for the next few minutes. Your blood pressure will also be monitored throughout the exercise portion. Corn through the exercise portion, a second round of radioactive tracer is injected into your body. Your heart rate and EKG will be monitored for another few minutes after administering the drug. Test Preparation:     Bring a list of your current medications. Do not take any of your medications the morning of the test, but bring all morning medications with you as you will take them after the stress portion of the test is completed.  Do not eat Bananas 24 hours prior to test.     No caffeine 24 hours prior to the testing. This includes: coffee, pop/soda, chocolate, cold medications, etc.  Any product that might contain caffeine.  No nicotine or alcohol 12 hours prior to your test.    Nothing to eat or drink 6-8 hours prior to appointment time.   It is okay to drink small amounts of water during the four hours prior to the test.   Nitroglycerin patches must be taken off 1 hour before testing.  Wear comfortable clothing.  Please refrain from any strenuous exercise or activities the day before your test, or the day of your test.   The Nuclear Lexiscan Stress test takes about 2 ½ to 3 hours to complete. Echocardiogram -  No prep. Takes approximately 30 min. An echocardiogram uses sound waves to produce images of your heart. This commonly used test allows your doctor to see how your heart is beating and pumping blood. Your doctor can use the images from an echocardiogram to identify various abnormalities in the heart muscle and valves. This test has 2 parts:   Ø You will be asked to disrobe from the waist up and given a gown to wear. The technologist will then hook up an EKG monitor to you for the entire exam.   Ø You will then have an ultrasound of your heart (echocardiogram) to assess the heart muscle, heart valves and heart function.

## 2020-09-28 ENCOUNTER — HOSPITAL ENCOUNTER (OUTPATIENT)
Dept: NUCLEAR MEDICINE | Age: 75
Discharge: HOME | End: 2020-09-30
Payer: MEDICARE

## 2020-09-28 ENCOUNTER — HOSPITAL ENCOUNTER (OUTPATIENT)
Dept: NON INVASIVE DIAGNOSTICS | Age: 75
Discharge: HOME | End: 2020-09-28
Payer: MEDICARE

## 2020-09-28 LAB
LV EF: 65 %
LVEF MODALITY: NORMAL

## 2020-09-28 PROCEDURE — 3430000000 HC RX DIAGNOSTIC RADIOPHARMACEUTICAL: Performed by: INTERNAL MEDICINE

## 2020-09-28 PROCEDURE — A9500 TC99M SESTAMIBI: HCPCS | Performed by: INTERNAL MEDICINE

## 2020-09-28 PROCEDURE — 93017 CV STRESS TEST TRACING ONLY: CPT

## 2020-09-28 PROCEDURE — 93306 TTE W/DOPPLER COMPLETE: CPT

## 2020-09-28 RX ADMIN — TETRAKIS(2-METHOXYISOBUTYLISOCYANIDE)COPPER(I) TETRAFLUOROBORATE 30 MILLICURIE: 1 INJECTION, POWDER, LYOPHILIZED, FOR SOLUTION INTRAVENOUS at 10:45

## 2020-09-28 RX ADMIN — TETRAKIS(2-METHOXYISOBUTYLISOCYANIDE)COPPER(I) TETRAFLUOROBORATE 10 MILLICURIE: 1 INJECTION, POWDER, LYOPHILIZED, FOR SOLUTION INTRAVENOUS at 09:41

## 2020-10-01 LAB
LV EF: 65 %
LVEF MODALITY: NORMAL

## 2020-10-07 ENCOUNTER — CLINICAL SUPPORT (OUTPATIENT)
Dept: INTERNAL MEDICINE | Facility: CLINIC | Age: 75
End: 2020-10-07

## 2020-10-07 DIAGNOSIS — D51.0 PERNICIOUS ANEMIA: ICD-10-CM

## 2020-10-07 PROCEDURE — 96372 THER/PROPH/DIAG INJ SC/IM: CPT | Performed by: NURSE PRACTITIONER

## 2020-10-07 RX ADMIN — CYANOCOBALAMIN 1000 MCG: 1000 INJECTION, SOLUTION INTRAMUSCULAR; SUBCUTANEOUS at 09:51

## 2020-10-10 ENCOUNTER — APPOINTMENT (OUTPATIENT)
Dept: CT IMAGING | Age: 75
End: 2020-10-10
Payer: MEDICARE

## 2020-10-10 ENCOUNTER — HOSPITAL ENCOUNTER (EMERGENCY)
Age: 75
Discharge: HOME | End: 2020-10-10
Payer: MEDICARE

## 2020-10-10 VITALS
RESPIRATION RATE: 20 BRPM | DIASTOLIC BLOOD PRESSURE: 73 MMHG | TEMPERATURE: 97.3 F | OXYGEN SATURATION: 93 % | BODY MASS INDEX: 31.5 KG/M2 | WEIGHT: 196 LBS | HEART RATE: 67 BPM | SYSTOLIC BLOOD PRESSURE: 152 MMHG | HEIGHT: 66 IN

## 2020-10-10 LAB
ALBUMIN SERPL-MCNC: 4 G/DL (ref 3.5–5.2)
ALP BLD-CCNC: 52 U/L (ref 40–130)
ALT SERPL-CCNC: 24 U/L (ref 5–41)
ANION GAP SERPL CALCULATED.3IONS-SCNC: 11 MMOL/L (ref 7–19)
AST SERPL-CCNC: 29 U/L (ref 5–40)
BASOPHILS ABSOLUTE: 0 K/UL (ref 0–0.2)
BASOPHILS RELATIVE PERCENT: 0.4 % (ref 0–1)
BILIRUB SERPL-MCNC: 0.3 MG/DL (ref 0.2–1.2)
BUN BLDV-MCNC: 23 MG/DL (ref 8–23)
CALCIUM SERPL-MCNC: 9.1 MG/DL (ref 8.8–10.2)
CHLORIDE BLD-SCNC: 104 MMOL/L (ref 98–111)
CO2: 22 MMOL/L (ref 22–29)
CREAT SERPL-MCNC: 0.5 MG/DL (ref 0.5–1.2)
EOSINOPHILS ABSOLUTE: 0.2 K/UL (ref 0–0.6)
EOSINOPHILS RELATIVE PERCENT: 1.9 % (ref 0–5)
GFR AFRICAN AMERICAN: >59
GFR NON-AFRICAN AMERICAN: >60
GLUCOSE BLD-MCNC: 211 MG/DL (ref 74–109)
HCT VFR BLD CALC: 33.4 % (ref 42–52)
HEMOGLOBIN: 10.8 G/DL (ref 14–18)
IMMATURE GRANULOCYTES #: 0 K/UL
LYMPHOCYTES ABSOLUTE: 1 K/UL (ref 1.1–4.5)
LYMPHOCYTES RELATIVE PERCENT: 12.9 % (ref 20–40)
MCH RBC QN AUTO: 26.9 PG (ref 27–31)
MCHC RBC AUTO-ENTMCNC: 32.3 G/DL (ref 33–37)
MCV RBC AUTO: 83.3 FL (ref 80–94)
MONOCYTES ABSOLUTE: 1 K/UL (ref 0–0.9)
MONOCYTES RELATIVE PERCENT: 12.8 % (ref 0–10)
NEUTROPHILS ABSOLUTE: 5.8 K/UL (ref 1.5–7.5)
NEUTROPHILS RELATIVE PERCENT: 71.6 % (ref 50–65)
PDW BLD-RTO: 15.5 % (ref 11.5–14.5)
PLATELET # BLD: 204 K/UL (ref 130–400)
PMV BLD AUTO: 10.9 FL (ref 9.4–12.4)
POTASSIUM REFLEX MAGNESIUM: 3.9 MMOL/L (ref 3.5–5)
RBC # BLD: 4.01 M/UL (ref 4.7–6.1)
SODIUM BLD-SCNC: 137 MMOL/L (ref 136–145)
TOTAL PROTEIN: 7.3 G/DL (ref 6.6–8.7)
WBC # BLD: 8.1 K/UL (ref 4.8–10.8)

## 2020-10-10 PROCEDURE — 80053 COMPREHEN METABOLIC PANEL: CPT

## 2020-10-10 PROCEDURE — 6360000002 HC RX W HCPCS: Performed by: PHYSICIAN ASSISTANT

## 2020-10-10 PROCEDURE — 99283 EMERGENCY DEPT VISIT LOW MDM: CPT

## 2020-10-10 PROCEDURE — 96374 THER/PROPH/DIAG INJ IV PUSH: CPT

## 2020-10-10 PROCEDURE — 70492 CT SFT TSUE NCK W/O & W/DYE: CPT

## 2020-10-10 PROCEDURE — 6360000004 HC RX CONTRAST MEDICATION: Performed by: PHYSICIAN ASSISTANT

## 2020-10-10 PROCEDURE — 36415 COLL VENOUS BLD VENIPUNCTURE: CPT

## 2020-10-10 PROCEDURE — 85025 COMPLETE CBC W/AUTO DIFF WBC: CPT

## 2020-10-10 PROCEDURE — 99282 EMERGENCY DEPT VISIT SF MDM: CPT

## 2020-10-10 PROCEDURE — 96375 TX/PRO/DX INJ NEW DRUG ADDON: CPT

## 2020-10-10 PROCEDURE — 99999 PR OFFICE/OUTPT VISIT,PROCEDURE ONLY: CPT | Performed by: PHYSICIAN ASSISTANT

## 2020-10-10 RX ORDER — DEXAMETHASONE SODIUM PHOSPHATE 10 MG/ML
10 INJECTION, SOLUTION INTRAMUSCULAR; INTRAVENOUS ONCE
Status: COMPLETED | OUTPATIENT
Start: 2020-10-10 | End: 2020-10-10

## 2020-10-10 RX ORDER — KETOROLAC TROMETHAMINE 30 MG/ML
15 INJECTION, SOLUTION INTRAMUSCULAR; INTRAVENOUS ONCE
Status: COMPLETED | OUTPATIENT
Start: 2020-10-10 | End: 2020-10-10

## 2020-10-10 RX ORDER — PREDNISONE 10 MG/1
10 TABLET ORAL DAILY
Qty: 10 TABLET | Refills: 0 | Status: SHIPPED | OUTPATIENT
Start: 2020-10-10 | End: 2020-10-20

## 2020-10-10 RX ADMIN — DEXAMETHASONE SODIUM PHOSPHATE 10 MG: 10 INJECTION, SOLUTION INTRAMUSCULAR; INTRAVENOUS at 18:13

## 2020-10-10 RX ADMIN — IOPAMIDOL 90 ML: 755 INJECTION, SOLUTION INTRAVENOUS at 18:59

## 2020-10-10 RX ADMIN — KETOROLAC TROMETHAMINE 15 MG: 30 INJECTION, SOLUTION INTRAMUSCULAR at 18:13

## 2020-10-10 ASSESSMENT — PAIN SCALES - GENERAL
PAINLEVEL_OUTOF10: 8
PAINLEVEL_OUTOF10: 8
PAINLEVEL_OUTOF10: 0

## 2020-10-10 ASSESSMENT — ENCOUNTER SYMPTOMS
SHORTNESS OF BREATH: 0
EYE DISCHARGE: 0
COUGH: 0
APNEA: 0
COLOR CHANGE: 0
PHOTOPHOBIA: 0
BACK PAIN: 0
EYE ITCHING: 0

## 2020-10-10 ASSESSMENT — PAIN DESCRIPTION - LOCATION: LOCATION: JAW

## 2020-10-10 ASSESSMENT — PAIN DESCRIPTION - ORIENTATION: ORIENTATION: LEFT

## 2020-10-10 ASSESSMENT — PAIN DESCRIPTION - DESCRIPTORS: DESCRIPTORS: STABBING

## 2020-10-10 NOTE — ED PROVIDER NOTES
[facility] EMERGENCY DEPT  eMERGENCYdEPARTMENT eNCOUnter      Pt Name: Monique Dominguez. MRN: [id number]  Birthdate 1945  Date of evaluation: 10/10/2020  Provider:YANCI Gold    CHIEF COMPLAINT       Chief Complaint   Patient presents with    Jaw Pain     s/p dental surgery Monday         HISTORY OF PRESENT ILLNESS  (Location/Symptom, Timing/Onset, Context/Setting, Quality, Duration, Modifying Factors, Severity.)   Monique Becerra is a 67 y.o. male who presents to the emergency department with complaints of recent dental implants on Monday and new onset left sided facial pain more so into pre-aricular lumphnode with mild swelling negative eye or max sinus involvemnet. Patient has positive trismus and trouble swallowing. No abx on board. HPI    Nursing Notes were reviewed and I agree. REVIEW OF SYSTEMS    (2-9 systems for level 4, 10 or more for level 5)     Review of Systems   Constitutional: Negative for activity change, appetite change, chills and fever. HENT: Negative for congestion and dental problem. Left sided facial ear pain   Eyes: Negative for photophobia, discharge and itching. Respiratory: Negative for apnea, cough and shortness of breath. Cardiovascular: Negative for chest pain. Musculoskeletal: Negative for arthralgias, back pain, gait problem, myalgias and neck pain. Skin: Negative for color change, pallor and rash. Neurological: Negative for dizziness, seizures and syncope. Psychiatric/Behavioral: Negative for agitation. The patient is not nervous/anxious. Except as noted above the remainder of the review of systems was reviewed and negative.        PAST MEDICAL HISTORY     Past Medical History:   Diagnosis Date    CAD (coronary artery disease)     Chronic back pain     Diverticulitis     Hip pain     right    Hyperlipidemia     Hypertension     Knee pain     Shoulder pain     Type II or unspecified type diabetes mellitus without mention of complication, not stated as uncontrolled          SURGICAL HISTORY       Past Surgical History:   Procedure Laterality Date    CORONARY ARTERY BYPASS GRAFT      [place] COLONOSCOPY W/BIOPSY SINGLE/MULTIPLE N/A 12/15/2017    Dr Cristina MUÑIZ (-) dysplasia x 1, HP x 1, mucosa--5 yr recall    [place] EGD TRANSORAL BIOPSY SINGLE/MULTIPLE N/A 12/15/2017    Dr Paola Agustin (-)    SHOULDER ARTHROSCOPY Left 3/16/2017    SHOULDER ARTHROSCOPIC SUBACROMIAL DECOMPRESSION, DISTAL CLAVICLE RESECTION, GLENUHUMERAL SYNOVECTOMY, OPEN ROTATOR CUFF REPAIR AND OPEN BICEPS TENODESIS LEFT  performed by Jo Donald DO at [address] [place] [address]      age 9         CURRENT MEDICATIONS       Discharge Medication List as of 10/10/2020  9:18 PM      CONTINUE these medications which have NOT CHANGED    Details   losartan (COZAAR) 50 MG tablet Take 12.5 mg by mouth dailyHistorical Med      omeprazole (PRILOSEC) 20 MG delayed release capsule Take 20 mg by mouth dailyHistorical Med      Aspirin Buf,YiMqo-GsLsa-FwHbh, (BUFFERED ASPIRIN) 325 MG TABS Take 1 tablet by mouth onceHistorical Med      glyBURIDE (DIABETA) 5 MG tablet Take 5 mg by mouth 2 times daily (with meals)Historical Med      atorvastatin (LIPITOR) 20 MG tablet Take 20 mg by mouth dailyHistorical Med      Multiple Vitamins-Minerals (MULTIVITAMIN PO) Take  by mouth daily. metFORMIN (GLUCOPHAGE) 500 MG tablet Take 500 mg by mouth 2 times daily (with meals).              ALLERGIES     Ace inhibitors and Valium [diazepam]    FAMILY HISTORY       Family History   Problem Relation Age of Onset    Cancer Mother     Heart Disease Father     Colon Cancer Neg Hx     Colon Polyps Neg Hx     Liver Cancer Neg Hx     Liver Disease Neg Hx     Esophageal Cancer Neg Hx     Rectal Cancer Neg Hx     Stomach Cancer Neg Hx           SOCIAL HISTORY       Social History     Socioeconomic History    Marital status:      Spouse name: None    Number of children: canal and external ear normal. There is no impacted cerumen. Left Ear: Tympanic membrane, ear canal and external ear normal. There is no impacted cerumen. Nose: Nose normal.      Mouth/Throat:      Mouth: Mucous membranes are moist.      Comments: Dental implants looks clean healthy no erythema or swelling about gingiva. Eyes:      Pupils: Pupils are equal, round, and reactive to light. Neck:      Musculoskeletal: Normal range of motion and neck supple. Trachea: No tracheal deviation. Cardiovascular:      Rate and Rhythm: Normal rate and regular rhythm. Pulses: Normal pulses. Heart sounds: Normal heart sounds. No murmur. Pulmonary:      Effort: Pulmonary effort is normal.      Breath sounds: Normal breath sounds. No stridor. No wheezing. Chest:      Chest wall: No tenderness. Abdominal:      General: Abdomen is flat. Bowel sounds are normal. There is no distension. Palpations: Abdomen is soft. Tenderness: There is no abdominal tenderness. Musculoskeletal: Normal range of motion. Skin:     General: Skin is warm and dry. Capillary Refill: Capillary refill takes less than 2 seconds. Neurological:      General: No focal deficit present. Mental Status: He is alert and oriented to person, place, and time. Mental status is at baseline. Psychiatric:         Mood and Affect: Mood normal.         Behavior: Behavior normal.         Thought Content: Thought content normal.         Judgment: Judgment normal.           DIAGNOSTIC RESULTS     RADIOLOGY:   Non-plain film images such as CT, Ultrasound and MRI are read by the radiologist. Plain radiographic images are visualized and preliminarilyinterpreted by No att. providers found with the below findings:      Interpretation per the Radiologist below, if available at the time of this note:    CT SOFT TISSUE NECK W WO CONTRAST   Final Result   Impression:   1.   No fluid collection or obvious inflammation in the left    space. 2.  Multifocal dental disease as described above. Signed by Dr Valentín Lincoln on 10/10/2020 8:08 PM          LABS:  Labs Reviewed   CBC WITH AUTO DIFFERENTIAL - Abnormal; Notable for the following components:       Result Value    RBC 4.01 (*)     Hemoglobin 10.8 (*)     Hematocrit 33.4 (*)     MCH 26.9 (*)     MCHC 32.3 (*)     RDW 15.5 (*)     Neutrophils % 71.6 (*)     Lymphocytes % 12.9 (*)     Monocytes % 12.8 (*)     Lymphocytes Absolute 1.0 (*)     Monocytes Absolute 1.00 (*)     All other components within normal limits   COMPREHENSIVE METABOLIC PANEL W/ REFLEX TO MG FOR LOW K - Abnormal; Notable for the following components:    Glucose 211 (*)     All other components within normal limits       All other labs were within normal range or notreturned as of this dictation. RE-ASSESSMENT        EMERGENCY DEPARTMENT COURSE and DIFFERENTIAL DIAGNOSIS/MDM:   Vitals:    Vitals:    10/10/20 1932 10/10/20 2002 10/10/20 2032 10/10/20 2102   BP: 102/82 (!) 160/82 (!) 158/66 (!) 152/73   Pulse: 86 72 65 67   Resp: 24 22 19 20   Temp:    97.3 °F (36.3 °C)   TempSrc:    Temporal   SpO2: 94% 91% 93% 93%   Weight:       Height:           MDM  Patient has immense improvement here with steroids consistent findings on CT for masseter inflammation but no infection. As he has complete resolve of symptoms here with steroids I like him to work on range of motion exercises keep his dental appointment follow-up steroids to take home anything worsening should return to ED I think this would be more appropriate to follow with PCP as needed for any persistence I do not think dentistry would have much to offer him as this does not appear to be an infectious etiology or dental source. He passes a p.o. challenge here with a ease for documentation. No trouble communicating. PROCEDURES:    Procedures      FINAL IMPRESSION      1.  Left-sided face pain          DISPOSITION/PLAN   DISPOSITION Decision To Discharge 10/10/2020 09:17:21 PM      PATIENT REFERRED TO:  [facility]Y [facility]  Tyron Naseem  [phone]    If symptoms worsen    Oziel Koehler MD  [address] [place] [address] [phone]      As needed      DISCHARGE MEDICATIONS:  Discharge Medication List as of 10/10/2020  9:18 PM      START taking these medications    Details   predniSONE (DELTASONE) 10 MG tablet Take 1 tablet by mouth daily for 10 days, Disp-10 tablet,R-0Print             (Please note that portions of this note were completed with a voice recognition program.  Efforts were made to edit the dictations but occasionallywords are mis-transcribed.)    Louis Best [address] [place] [address]  10/10/20 8849

## 2020-10-15 ENCOUNTER — CLINICAL SUPPORT (OUTPATIENT)
Dept: INTERNAL MEDICINE | Facility: CLINIC | Age: 75
End: 2020-10-15

## 2020-10-15 DIAGNOSIS — D51.0 PERNICIOUS ANEMIA: ICD-10-CM

## 2020-10-15 PROCEDURE — 96372 THER/PROPH/DIAG INJ SC/IM: CPT | Performed by: NURSE PRACTITIONER

## 2020-10-15 RX ADMIN — CYANOCOBALAMIN 1000 MCG: 1000 INJECTION, SOLUTION INTRAMUSCULAR; SUBCUTANEOUS at 09:44

## 2020-10-23 RX ORDER — OMEPRAZOLE 20 MG/1
20 CAPSULE, DELAYED RELEASE ORAL DAILY
Qty: 90 CAPSULE | Refills: 3 | Status: SHIPPED | OUTPATIENT
Start: 2020-10-23 | End: 2022-02-07

## 2020-11-04 ENCOUNTER — CLINICAL SUPPORT (OUTPATIENT)
Dept: INTERNAL MEDICINE | Facility: CLINIC | Age: 75
End: 2020-11-04

## 2020-11-04 DIAGNOSIS — D51.0 PERNICIOUS ANEMIA: ICD-10-CM

## 2020-11-04 PROCEDURE — 96372 THER/PROPH/DIAG INJ SC/IM: CPT | Performed by: INTERNAL MEDICINE

## 2020-11-04 RX ADMIN — CYANOCOBALAMIN 1000 MCG: 1000 INJECTION, SOLUTION INTRAMUSCULAR; SUBCUTANEOUS at 10:25

## 2020-11-16 ENCOUNTER — TELEPHONE (OUTPATIENT)
Dept: INTERNAL MEDICINE | Facility: CLINIC | Age: 75
End: 2020-11-16

## 2020-11-16 ENCOUNTER — CLINICAL SUPPORT (OUTPATIENT)
Dept: INTERNAL MEDICINE | Facility: CLINIC | Age: 75
End: 2020-11-16

## 2020-11-16 DIAGNOSIS — D51.0 PERNICIOUS ANEMIA: ICD-10-CM

## 2020-11-16 DIAGNOSIS — E11.65 TYPE 2 DIABETES MELLITUS WITH HYPERGLYCEMIA, UNSPECIFIED WHETHER LONG TERM INSULIN USE (HCC): Primary | ICD-10-CM

## 2020-11-16 PROCEDURE — 96372 THER/PROPH/DIAG INJ SC/IM: CPT | Performed by: INTERNAL MEDICINE

## 2020-11-16 RX ORDER — LANCETS
1 EACH MISCELLANEOUS 2 TIMES DAILY
Qty: 200 EACH | Refills: 3 | Status: SHIPPED | OUTPATIENT
Start: 2020-11-16 | End: 2023-01-20 | Stop reason: SDUPTHER

## 2020-11-16 RX ADMIN — CYANOCOBALAMIN 1000 MCG: 1000 INJECTION, SOLUTION INTRAMUSCULAR; SUBCUTANEOUS at 11:51

## 2020-11-25 ENCOUNTER — OFFICE VISIT (OUTPATIENT)
Dept: INTERNAL MEDICINE | Facility: CLINIC | Age: 75
End: 2020-11-25

## 2020-11-25 VITALS
HEART RATE: 66 BPM | BODY MASS INDEX: 31.02 KG/M2 | HEIGHT: 66 IN | WEIGHT: 193 LBS | DIASTOLIC BLOOD PRESSURE: 82 MMHG | TEMPERATURE: 97.3 F | OXYGEN SATURATION: 99 % | SYSTOLIC BLOOD PRESSURE: 136 MMHG

## 2020-11-25 DIAGNOSIS — Z12.5 SCREENING PSA (PROSTATE SPECIFIC ANTIGEN): ICD-10-CM

## 2020-11-25 DIAGNOSIS — E11.9 NON-INSULIN DEPENDENT TYPE 2 DIABETES MELLITUS (HCC): ICD-10-CM

## 2020-11-25 DIAGNOSIS — R97.20 ELEVATED PSA: ICD-10-CM

## 2020-11-25 DIAGNOSIS — C61 PROSTATE CANCER (HCC): ICD-10-CM

## 2020-11-25 DIAGNOSIS — E55.9 VITAMIN D DEFICIENCY: ICD-10-CM

## 2020-11-25 DIAGNOSIS — Z11.59 NEED FOR HEPATITIS C SCREENING TEST: ICD-10-CM

## 2020-11-25 DIAGNOSIS — I25.10 CORONARY ARTERY DISEASE INVOLVING NATIVE CORONARY ARTERY OF NATIVE HEART WITHOUT ANGINA PECTORIS: Primary | ICD-10-CM

## 2020-11-25 DIAGNOSIS — D51.0 PERNICIOUS ANEMIA: ICD-10-CM

## 2020-11-25 LAB — HBA1C MFR BLD: 7.2 %

## 2020-11-25 PROCEDURE — 99213 OFFICE O/P EST LOW 20 MIN: CPT | Performed by: NURSE PRACTITIONER

## 2020-11-25 PROCEDURE — 83036 HEMOGLOBIN GLYCOSYLATED A1C: CPT | Performed by: NURSE PRACTITIONER

## 2020-11-25 NOTE — PATIENT INSTRUCTIONS

## 2020-11-25 NOTE — PROGRESS NOTES
"Subjective   Michael Novak is a 75 y.o. male.   Chief Complaint   Patient presents with   • Medicare Wellness-Initial Visit   • Diabetes     A1C:7.2%       Mr. Novak is a pleasant 75-year-old male who presents to the office today for diabetes assessment and annual lab collection.  Patient's A1c at last visit was 8.2 and today is 7.2.  Patient reports that he has been working on reducing sugar in his diet and eliminating carbs.  Patient states \"it has been hard to cut out the sugar in my diet, however I know I need to do this for my health \".  Patient has a follow-up with dietitian in January to monitor his progress.  He has been checking his blood sugars daily and reports they range from 90s to 110s.  He reports occasional high blood sugar of 180-200, but reports \"I know what I ate that caused this \".  He denies low blood sugars or issues with chronic fatigue.       The following portions of the patient's history were reviewed and updated as appropriate: allergies, current medications, past family history, past medical history, past social history, past surgical history and problem list.    Review of Systems   Constitutional: Negative for activity change, appetite change, fatigue, fever, unexpected weight gain and unexpected weight loss.   HENT: Negative for congestion, dental problem, drooling, ear discharge, ear pain, nosebleeds, postnasal drip, rhinorrhea, sinus pressure, sneezing, sore throat, swollen glands, trouble swallowing and voice change.    Eyes: Negative for blurred vision and visual disturbance.   Respiratory: Negative for cough and shortness of breath.    Cardiovascular: Negative for chest pain, palpitations and leg swelling.   Gastrointestinal: Negative for abdominal pain, constipation, diarrhea, nausea, vomiting and indigestion.   Endocrine: Negative for cold intolerance, heat intolerance, polydipsia and polyphagia.   Genitourinary: Negative for discharge, dysuria, flank pain, " frequency, hematuria, penile swelling, scrotal swelling, testicular pain, urgency and urinary incontinence.   Musculoskeletal: Negative for arthralgias, back pain, joint swelling and neck pain.   Skin: Negative for color change, rash and skin lesions.   Neurological: Negative for dizziness, weakness, headache, memory problem and confusion.   Hematological: Does not bruise/bleed easily.   Psychiatric/Behavioral: Negative for agitation, hallucinations and suicidal ideas. The patient is not nervous/anxious.        Objective   Past Medical History:   Diagnosis Date   • Back pain    • Coronary artery disease    • Diabetes mellitus (CMS/HCC)    • Hearing deficit    • Hypertension    • Prostate cancer (CMS/HCC)       Past Surgical History:   Procedure Laterality Date   • CORONARY ARTERY BYPASS GRAFT     • ROTATOR CUFF REPAIR Left 03/15/2017        Current Outpatient Medications:   •  Accu-Chek Softclix Lancets lancets, 1 each by Other route 2 (two) times a day. Use as instructed, Disp: 200 each, Rfl: 3  •  aspirin 325 MG tablet, Take 325 mg by mouth Daily., Disp: , Rfl:   •  atorvastatin (LIPITOR) 40 MG tablet, Take 1 tablet by mouth once daily, Disp: 90 tablet, Rfl: 3  •  glucose blood (OneTouch Verio) test strip, 1 each by Other route 2 (Two) Times a Day. Use as instructed, Disp: 100 each, Rfl: 5  •  glucose monitor monitoring kit, 1 each 2 (Two) Times a Day., Disp: 1 each, Rfl: 0  •  glyburide (Diabeta) 5 MG tablet, Take 2 tablets before breakfast and 1 tablet before dinner., Disp: 180 tablet, Rfl: 1  •  losartan (COZAAR) 50 MG tablet, Take 1 tablet by mouth Daily., Disp: 30 tablet, Rfl: 11  •  metFORMIN (GLUCOPHAGE) 1000 MG tablet, Take 1,000 mg by mouth 2 (Two) Times a Day With Meals., Disp: , Rfl:   •  Multiple Vitamins-Minerals (MULTIVITAMIN MEN) tablet, Take 1 tablet by mouth Daily., Disp: , Rfl:   •  omeprazole (priLOSEC) 20 MG capsule, Take 1 capsule by mouth Daily., Disp: 90 capsule, Rfl: 3  •  vitamin D  (ERGOCALCIFEROL) 1.25 MG (48167 UT) capsule capsule, Take 1 capsule by mouth 1 (One) Time Per Week., Disp: 5 capsule, Rfl: 11    Current Facility-Administered Medications:   •  cyanocobalamin injection 1,000 mcg, 1,000 mcg, Intramuscular, Q28 Days, Paramjit Shi MD, 1,000 mcg at 11/16/20 1151     Vitals:    11/25/20 0748   BP: 136/82   Pulse: 66   Temp: 97.3 °F (36.3 °C)   SpO2: 99%         11/25/20  0748   Weight: 87.5 kg (193 lb)     Patient's Body mass index is 31.15 kg/m². BMI is above normal parameters. Recommendations include: educational material, exercise counseling, nutrition counseling and referral to a nutritionist.      Physical Exam  Vitals signs and nursing note reviewed.   Constitutional:       Appearance: Normal appearance. He is well-developed. He is obese.   HENT:      Head: Normocephalic and atraumatic.      Right Ear: Hearing, tympanic membrane, ear canal and external ear normal.      Left Ear: Hearing, tympanic membrane, ear canal and external ear normal.      Ears:      Comments: Wearing hearing aids bilaterally, normal hearing with hearing aids     Nose: Nose normal.      Mouth/Throat:      Lips: Pink.      Mouth: Mucous membranes are moist.      Pharynx: Oropharynx is clear. Uvula midline.   Eyes:      General: Lids are normal. Lids are everted, no foreign bodies appreciated. Vision grossly intact.      Conjunctiva/sclera: Conjunctivae normal.      Pupils: Pupils are equal, round, and reactive to light.   Neck:      Musculoskeletal: Normal range of motion and neck supple.      Thyroid: No thyromegaly.   Cardiovascular:      Rate and Rhythm: Normal rate and regular rhythm.      Pulses: Normal pulses.      Heart sounds: Normal heart sounds.   Pulmonary:      Effort: Pulmonary effort is normal.      Breath sounds: Normal breath sounds.   Abdominal:      General: Bowel sounds are normal.      Palpations: Abdomen is soft.   Musculoskeletal:      Right lower leg: No edema.      Left lower leg:  No edema.   Lymphadenopathy:      Cervical: No cervical adenopathy.   Skin:     General: Skin is warm and dry.      Capillary Refill: Capillary refill takes less than 2 seconds.   Neurological:      General: No focal deficit present.      Mental Status: He is alert and oriented to person, place, and time.   Psychiatric:         Attention and Perception: Attention normal.         Mood and Affect: Mood normal.         Speech: Speech normal.         Behavior: Behavior normal. Behavior is cooperative.         Thought Content: Thought content normal.               Assessment/Plan   Diagnoses and all orders for this visit:    1. Coronary artery disease involving native coronary artery of native heart without angina pectoris (Primary)  -     Comprehensive Metabolic Panel  -     CBC & Differential  -     Lipid Panel  -     TSH  -     Uric Acid  -     Urinalysis With Microscopic - Urine, Clean Catch  -     MicroAlbumin, Urine, Random - Urine, Clean Catch  -     Hepatitis C Antibody    2. Screening PSA (prostate specific antigen)    3. Need for hepatitis C screening test  -     Hepatitis C Antibody    4. Elevated PSA    5. Vitamin D deficiency  -     Vitamin D 25 Hydroxy    6. Prostate cancer (CMS/HCC)  -     PSA DIAGNOSTIC ONLY    7. Pernicious anemia  -     Comprehensive Metabolic Panel  -     CBC & Differential  -     Lipid Panel  -     TSH  -     Uric Acid  -     Urinalysis With Microscopic - Urine, Clean Catch  -     MicroAlbumin, Urine, Random - Urine, Clean Catch  -     Vitamin D 25 Hydroxy    8. Non-insulin dependent type 2 diabetes mellitus (CMS/HCC)  -     POC Glycosylated Hemoglobin (Hb A1C)      Will complete yearly labs today and call patient on Monday with results.  Patient will continue on current diabetic regiment since A1c has improved.  We will follow-up in 3 months for annual exam and repeat A1c.  Patient did not have any other acute complaints to assess today.  We will continue to work with dietitian for  diabetes management and assist in weight loss to improve blood sugar control.

## 2020-11-26 LAB
25(OH)D3+25(OH)D2 SERPL-MCNC: 42.5 NG/ML (ref 30–100)
ALBUMIN SERPL-MCNC: 4.3 G/DL (ref 3.5–5.2)
ALBUMIN/GLOB SERPL: 1.6 G/DL
ALP SERPL-CCNC: 52 U/L (ref 39–117)
ALT SERPL-CCNC: 25 U/L (ref 1–41)
APPEARANCE UR: CLEAR
AST SERPL-CCNC: 25 U/L (ref 1–40)
BACTERIA #/AREA URNS HPF: NORMAL /HPF
BASOPHILS # BLD AUTO: 0.03 10*3/MM3 (ref 0–0.2)
BASOPHILS NFR BLD AUTO: 0.6 % (ref 0–1.5)
BILIRUB SERPL-MCNC: 0.2 MG/DL (ref 0–1.2)
BILIRUB UR QL STRIP: NEGATIVE
BUN SERPL-MCNC: 23 MG/DL (ref 8–23)
BUN/CREAT SERPL: 25.6 (ref 7–25)
CALCIUM SERPL-MCNC: 8.8 MG/DL (ref 8.6–10.5)
CASTS URNS MICRO: NORMAL
CHLORIDE SERPL-SCNC: 105 MMOL/L (ref 98–107)
CHOLEST SERPL-MCNC: 181 MG/DL (ref 0–200)
CO2 SERPL-SCNC: 23.4 MMOL/L (ref 22–29)
COLOR UR: YELLOW
CREAT SERPL-MCNC: 0.9 MG/DL (ref 0.76–1.27)
EOSINOPHIL # BLD AUTO: 0.2 10*3/MM3 (ref 0–0.4)
EOSINOPHIL NFR BLD AUTO: 4.1 % (ref 0.3–6.2)
EPI CELLS #/AREA URNS HPF: NORMAL /HPF
ERYTHROCYTE [DISTWIDTH] IN BLOOD BY AUTOMATED COUNT: 15.6 % (ref 12.3–15.4)
GLOBULIN SER CALC-MCNC: 2.7 GM/DL
GLUCOSE SERPL-MCNC: 135 MG/DL (ref 65–99)
GLUCOSE UR QL: ABNORMAL
HCT VFR BLD AUTO: 34.4 % (ref 37.5–51)
HCV AB S/CO SERPL IA: <0.1 S/CO RATIO (ref 0–0.9)
HDLC SERPL-MCNC: 56 MG/DL (ref 40–60)
HGB BLD-MCNC: 11.5 G/DL (ref 13–17.7)
HGB UR QL STRIP: NEGATIVE
IMM GRANULOCYTES # BLD AUTO: 0.02 10*3/MM3 (ref 0–0.05)
IMM GRANULOCYTES NFR BLD AUTO: 0.4 % (ref 0–0.5)
KETONES UR QL STRIP: NEGATIVE
LDLC SERPL CALC-MCNC: 103 MG/DL (ref 0–100)
LEUKOCYTE ESTERASE UR QL STRIP: NEGATIVE
LYMPHOCYTES # BLD AUTO: 1.25 10*3/MM3 (ref 0.7–3.1)
LYMPHOCYTES NFR BLD AUTO: 25.9 % (ref 19.6–45.3)
MCH RBC QN AUTO: 26.9 PG (ref 26.6–33)
MCHC RBC AUTO-ENTMCNC: 33.4 G/DL (ref 31.5–35.7)
MCV RBC AUTO: 80.6 FL (ref 79–97)
MICROALBUMIN UR-MCNC: 61.4 UG/ML
MONOCYTES # BLD AUTO: 0.56 10*3/MM3 (ref 0.1–0.9)
MONOCYTES NFR BLD AUTO: 11.6 % (ref 5–12)
NEUTROPHILS # BLD AUTO: 2.76 10*3/MM3 (ref 1.7–7)
NEUTROPHILS NFR BLD AUTO: 57.4 % (ref 42.7–76)
NITRITE UR QL STRIP: NEGATIVE
NRBC BLD AUTO-RTO: 0 /100 WBC (ref 0–0.2)
PH UR STRIP: 5.5 [PH] (ref 5–8)
PLATELET # BLD AUTO: 203 10*3/MM3 (ref 140–450)
POTASSIUM SERPL-SCNC: 4.2 MMOL/L (ref 3.5–5.2)
PROT SERPL-MCNC: 7 G/DL (ref 6–8.5)
PROT UR QL STRIP: ABNORMAL
PSA SERPL-MCNC: 0.24 NG/ML (ref 0–4)
RBC # BLD AUTO: 4.27 10*6/MM3 (ref 4.14–5.8)
RBC #/AREA URNS HPF: NORMAL /HPF
SODIUM SERPL-SCNC: 138 MMOL/L (ref 136–145)
SP GR UR: 1.03 (ref 1–1.03)
TRIGL SERPL-MCNC: 127 MG/DL (ref 0–150)
TSH SERPL DL<=0.005 MIU/L-ACNC: 2.86 UIU/ML (ref 0.27–4.2)
URATE SERPL-MCNC: 4.6 MG/DL (ref 3.4–7)
UROBILINOGEN UR STRIP-MCNC: ABNORMAL MG/DL
VLDLC SERPL CALC-MCNC: 22 MG/DL (ref 5–40)
WBC # BLD AUTO: 4.82 10*3/MM3 (ref 3.4–10.8)
WBC #/AREA URNS HPF: NORMAL /HPF

## 2020-12-04 ENCOUNTER — CLINICAL SUPPORT (OUTPATIENT)
Dept: INTERNAL MEDICINE | Facility: CLINIC | Age: 75
End: 2020-12-04

## 2020-12-04 DIAGNOSIS — D51.0 PERNICIOUS ANEMIA: ICD-10-CM

## 2020-12-04 PROCEDURE — 96372 THER/PROPH/DIAG INJ SC/IM: CPT | Performed by: INTERNAL MEDICINE

## 2020-12-04 RX ADMIN — CYANOCOBALAMIN 1000 MCG: 1000 INJECTION, SOLUTION INTRAMUSCULAR; SUBCUTANEOUS at 08:16

## 2020-12-15 ENCOUNTER — CLINICAL SUPPORT (OUTPATIENT)
Dept: INTERNAL MEDICINE | Facility: CLINIC | Age: 75
End: 2020-12-15

## 2020-12-15 DIAGNOSIS — D51.0 PERNICIOUS ANEMIA: ICD-10-CM

## 2020-12-15 PROCEDURE — 96372 THER/PROPH/DIAG INJ SC/IM: CPT | Performed by: NURSE PRACTITIONER

## 2020-12-15 RX ADMIN — CYANOCOBALAMIN 1000 MCG: 1000 INJECTION, SOLUTION INTRAMUSCULAR; SUBCUTANEOUS at 08:22

## 2021-01-04 ENCOUNTER — CLINICAL SUPPORT (OUTPATIENT)
Dept: INTERNAL MEDICINE | Facility: CLINIC | Age: 76
End: 2021-01-04

## 2021-01-04 DIAGNOSIS — D51.0 PERNICIOUS ANEMIA: ICD-10-CM

## 2021-01-04 PROCEDURE — 96372 THER/PROPH/DIAG INJ SC/IM: CPT | Performed by: NURSE PRACTITIONER

## 2021-01-04 RX ADMIN — CYANOCOBALAMIN 1000 MCG: 1000 INJECTION, SOLUTION INTRAMUSCULAR; SUBCUTANEOUS at 08:16

## 2021-01-15 ENCOUNTER — CLINICAL SUPPORT (OUTPATIENT)
Dept: INTERNAL MEDICINE | Facility: CLINIC | Age: 76
End: 2021-01-15

## 2021-01-15 DIAGNOSIS — D51.0 PERNICIOUS ANEMIA: ICD-10-CM

## 2021-01-15 PROCEDURE — 96372 THER/PROPH/DIAG INJ SC/IM: CPT | Performed by: NURSE PRACTITIONER

## 2021-01-15 RX ADMIN — CYANOCOBALAMIN 1000 MCG: 1000 INJECTION, SOLUTION INTRAMUSCULAR; SUBCUTANEOUS at 09:08

## 2021-01-21 ENCOUNTER — OFFICE VISIT (OUTPATIENT)
Dept: INTERNAL MEDICINE | Facility: CLINIC | Age: 76
End: 2021-01-21

## 2021-01-21 VITALS
DIASTOLIC BLOOD PRESSURE: 64 MMHG | HEART RATE: 62 BPM | TEMPERATURE: 97.3 F | BODY MASS INDEX: 31.34 KG/M2 | OXYGEN SATURATION: 99 % | WEIGHT: 195 LBS | SYSTOLIC BLOOD PRESSURE: 122 MMHG | HEIGHT: 66 IN

## 2021-01-21 DIAGNOSIS — H61.22 IMPACTED CERUMEN OF LEFT EAR: Primary | ICD-10-CM

## 2021-01-21 PROCEDURE — 99213 OFFICE O/P EST LOW 20 MIN: CPT | Performed by: NURSE PRACTITIONER

## 2021-01-21 PROCEDURE — 69209 REMOVE IMPACTED EAR WAX UNI: CPT | Performed by: NURSE PRACTITIONER

## 2021-01-21 NOTE — PROGRESS NOTES
"Subjective   Michael Novak is a 75 y.o. male.   Chief Complaint   Patient presents with   • Fluid in Ear     Sweet oil in left ear, feeling \"water logged\" x 1 week ago        reports about 1 week ago he put sweet oil in his left ear and fell asleep.  When he woke he couldn't wash it out.  He has felt fullness to the left ear since then.  He denies drainage, or pain.  No past history of TM ruptures.  No sinus congestion.     Ear Fullness   There is pain in the left ear. This is a new problem. The current episode started 1 to 4 weeks ago (1 week). The problem occurs constantly. The problem has been unchanged. There has been no fever. Pertinent negatives include no abdominal pain, coughing, diarrhea, ear discharge, neck pain, rash or vomiting.        The following portions of the patient's history were reviewed and updated as appropriate: allergies, current medications, past family history, past medical history, past social history, past surgical history and problem list.    Review of Systems   Constitutional: Negative for activity change, appetite change, fatigue, fever, unexpected weight gain and unexpected weight loss.   HENT: Negative for ear discharge, ear pain, swollen glands, trouble swallowing and voice change.         Left ear fullness   Eyes: Negative for blurred vision and visual disturbance.   Respiratory: Negative for cough and shortness of breath.    Cardiovascular: Negative for chest pain, palpitations and leg swelling.   Gastrointestinal: Negative for abdominal pain, constipation, diarrhea, nausea, vomiting and indigestion.   Endocrine: Negative for cold intolerance, heat intolerance, polydipsia and polyphagia.   Genitourinary: Negative for dysuria and frequency.   Musculoskeletal: Negative for arthralgias, back pain, joint swelling and neck pain.   Skin: Negative for color change, rash and skin lesions.   Neurological: Negative for dizziness, weakness, headache, memory problem and " confusion.   Hematological: Does not bruise/bleed easily.   Psychiatric/Behavioral: Negative for agitation, hallucinations and suicidal ideas. The patient is not nervous/anxious.        Objective   Past Medical History:   Diagnosis Date   • Back pain    • Coronary artery disease    • Diabetes mellitus (CMS/HCC)    • Hearing deficit    • Hypertension    • Prostate cancer (CMS/HCC)       Past Surgical History:   Procedure Laterality Date   • CORONARY ARTERY BYPASS GRAFT     • ROTATOR CUFF REPAIR Left 03/15/2017        Current Outpatient Medications:   •  Accu-Chek Softclix Lancets lancets, 1 each by Other route 2 (two) times a day. Use as instructed, Disp: 200 each, Rfl: 3  •  aspirin 325 MG tablet, Take 325 mg by mouth Daily., Disp: , Rfl:   •  atorvastatin (LIPITOR) 40 MG tablet, Take 1 tablet by mouth once daily, Disp: 90 tablet, Rfl: 3  •  glucose blood (OneTouch Verio) test strip, 1 each by Other route 2 (Two) Times a Day. Use as instructed, Disp: 100 each, Rfl: 5  •  glucose monitor monitoring kit, 1 each 2 (Two) Times a Day., Disp: 1 each, Rfl: 0  •  glyburide (Diabeta) 5 MG tablet, Take 2 tablets before breakfast and 1 tablet before dinner., Disp: 180 tablet, Rfl: 1  •  losartan (COZAAR) 50 MG tablet, Take 1 tablet by mouth Daily., Disp: 30 tablet, Rfl: 11  •  metFORMIN (GLUCOPHAGE) 1000 MG tablet, Take 1,000 mg by mouth 2 (Two) Times a Day With Meals., Disp: , Rfl:   •  Multiple Vitamins-Minerals (MULTIVITAMIN MEN) tablet, Take 1 tablet by mouth Daily., Disp: , Rfl:   •  omeprazole (priLOSEC) 20 MG capsule, Take 1 capsule by mouth Daily., Disp: 90 capsule, Rfl: 3  •  vitamin D (ERGOCALCIFEROL) 1.25 MG (13400 UT) capsule capsule, Take 1 capsule by mouth 1 (One) Time Per Week., Disp: 5 capsule, Rfl: 11    Current Facility-Administered Medications:   •  cyanocobalamin injection 1,000 mcg, 1,000 mcg, Intramuscular, Q28 Days, Paramjit Shi MD, 1,000 mcg at 01/15/21 0908     Vitals:    01/21/21 1033   BP:  122/64   Pulse: 62   Temp: 97.3 °F (36.3 °C)   SpO2: 99%         01/21/21  1033   Weight: 88.5 kg (195 lb)         Physical Exam  Constitutional:       General: He is not in acute distress.     Appearance: He is well-developed.   HENT:      Head: Normocephalic.      Right Ear: Tympanic membrane and external ear normal. Tympanic membrane is not erythematous.      Left Ear: Tympanic membrane and external ear normal.      Ears:      Comments: Cerumen impaction to the left ear      Mouth/Throat:      Mouth: Mucous membranes are moist. No oral lesions.      Pharynx: Oropharynx is clear.   Eyes:      Conjunctiva/sclera: Conjunctivae normal.   Cardiovascular:      Rate and Rhythm: Normal rate and regular rhythm.      Pulses: Normal pulses.      Heart sounds: Normal heart sounds.   Pulmonary:      Effort: Pulmonary effort is normal.      Breath sounds: Normal breath sounds.   Skin:     General: Skin is warm and dry.   Neurological:      Mental Status: He is alert and oriented to person, place, and time.      Gait: Gait normal.   Psychiatric:         Mood and Affect: Mood normal.         Speech: Speech normal.         Behavior: Behavior normal.         Thought Content: Thought content normal.         Ear Cerumen Removal    Date/Time: 1/21/2021 11:47 AM  Performed by: Patria Quintanilla APRN  Authorized by: Patria Quintanilla APRN   Location details: left ear  Patient tolerance: patient tolerated the procedure well with no immediate complications  Procedure type: irrigation             Assessment/Plan   Diagnoses and all orders for this visit:    1. Impacted cerumen of left ear (Primary)  -     Ear Cerumen Removal        Cerumen impaction is deep in the canal.  Will use Lavage to remove this.  Patient tolerated this well and stated he felt better.  Advised to notify the office if he has any other concerns regarding this or develops discomfort.

## 2021-01-28 ENCOUNTER — HOSPITAL ENCOUNTER (OUTPATIENT)
Dept: NUTRITION | Facility: HOSPITAL | Age: 76
Discharge: HOME | End: 2021-01-28

## 2021-01-28 NOTE — PROGRESS NOTES
Nutrition Services    Patient Name:  Michael Novak  YOB: 1945  MRN: [id number]  Admit Date:  (Not on file)    Was able to speak to Pt on the phone briefly this morning.  Pt gave consent for tele-health visit via telephone, however within a few minutes of being on the phone with Patient, able to hear in background that he was having to go somewhere and would be in the car.  Asked Pt if this was a bad time and if it would be better to reschedule our visit for a different day; he stated that it would be much better to be scheduled on a different day. RD requested reschedule of appointment via our central scheduling department.  I look forward to speaking with Mr. Novak in the near future.     Electronically signed by:  Carmen Moy RDN, SHE  01/28/21 10:05 CST

## 2021-02-03 ENCOUNTER — CLINICAL SUPPORT (OUTPATIENT)
Dept: INTERNAL MEDICINE | Facility: CLINIC | Age: 76
End: 2021-02-03

## 2021-02-03 DIAGNOSIS — D51.0 PERNICIOUS ANEMIA: ICD-10-CM

## 2021-02-03 PROCEDURE — 96372 THER/PROPH/DIAG INJ SC/IM: CPT | Performed by: NURSE PRACTITIONER

## 2021-02-03 RX ADMIN — CYANOCOBALAMIN 1000 MCG: 1000 INJECTION, SOLUTION INTRAMUSCULAR; SUBCUTANEOUS at 08:11

## 2021-02-04 ENCOUNTER — HOSPITAL ENCOUNTER (OUTPATIENT)
Dept: NUTRITION | Facility: HOSPITAL | Age: 76
Discharge: HOME | End: 2021-02-04

## 2021-02-04 VITALS — WEIGHT: 195 LBS | BODY MASS INDEX: 31.34 KG/M2 | HEIGHT: 66 IN

## 2021-02-04 NOTE — PROGRESS NOTES
"Adult Outpatient Nutrition  Assessment/PES    Patient Name:  Michael Novak  YOB: 1945  MRN: [id number]    Assessment Date:  2/4/2021        General Info     Row Name 02/04/21 1641       Today's Session    Person(s) attending today's session  Patient telehealth via telephone with consent of Pt       General Information    Preferred Language  English    Lives With  significant other          Anthropometrics     Row Name 02/04/21 1642          Anthropometrics    Height  167.6 cm (66\")     Weight  88.5 kg (195 lb)        Ideal Body Weight (IBW)    Ideal Body Weight (IBW) (kg)  65.3     % Ideal Body Weight  135.44        Body Mass Index (BMI)    BMI (kg/m2)  31.54         Nutritional Info/Activity     Row Name 02/04/21 1645       Nutritional Information    Functional Status  able to prepare meals;able to purchase food;ambulatory    How many times do you eat fruit per day?  -- picky about fruit; doesn't eat alot of fruit    How many times do you eat vegetables per day?  -- picky about vegetables; prefers them cooked but still crunchy. usually eats them at supper time but thats it    How many times do you eat baked goods per day?  1 states he has to have something sweet every day; usually thats a piece of cake or pie, today he's making banana bread (made with splenda)    How many times do you eat ice cream per day?  -- has a sugar free frozen yogurt he sometimes eats as a sweet snack    How many diet sodas do you drink per day?  1    How many times do you drink alcohol per day?  -- x1-2 glasses of beer or wine each week    How many servings of artificial sweetner do you have per day?  -- used Splenda in all his baked goods    How many meals do you eat each day?  3    How many snacks do you eat each day?  0    What is the biggest challenge you have with your diet?  Knowledge    Enter everything you can remember eating in the last 24 hours (1 day)  Brkfst usually: Egg biscuit, Lunch usually: " "Ham&Cheese sandwich on wheat bread, Dinner usually: eggs or chicken (eats primarily poultry or fish) with some vegetables (names carrots, onions, peppers, sweet potatoes or potatoes). Throughout the day drinks water and usually 1 diet soda        Home Nutrition Report     Row Name 02/04/21 1650          Home Nutrition Report    Typical Food/Fluid Intake  Pt states he's really cut back on sweets and carbohydrate foods (however had some misconceptions on what constituted carb foods).  A1C did go from 8.1 in August 2020 to 7.2 in Nov 2020.  He seems indifferent about discussing diet with RD, feels he's made adequate changes but states \"it's just hard\"     Food Preferences  States he can't go without something sweet everyday; we discussed healthier options like PB on citlalli crackers or fruit with SF yogurt rather than cakes/pies.     Meal/Snack Patterns  usually 3 meals/d; does fast on Mondays.  Has considered doing the Keto diet         Estimated/Assessed Needs     Row Name 02/04/21 1642          Calculation Measurements    Weight Used For Calculations  88.5 kg (195 lb)     Height  167.6 cm (66\")        Estimated/Assessed Needs    Additional Documentation  Fluid Requirements (Group);Gem-St. Jeor Equation (Group);Protein Requirements (Group)        Gem-St. Jeor Equation    RMR (Gem-St. Jeor Equation)  1557.26     Gem-St. Jeor Activity Factors  1.4 - 1.5 AF1.4 - 250= 1930     Activity Factors (Gem-St. Jeor)  2180.164 - 2335.89        Protein Requirements    Weight Used For Protein Calculations  88.5 kg (195 lb)     Est Protein Requirement Amount (gms/kg)  1.0 gm protein     Estimated Protein Requirements (gms/day)  88.45        Fluid Requirements    Fluid Requirements (mL/day)  1930     Estimated Fluid Requirement Method  RDA Method     RDA Method (mL)  1930                 Problem/Interventions:  Problem 1     Row Name 02/04/21 1655          Nutrition Diagnoses Problem 1    Problem 1  Knowledge " Deficit     Etiology (related to)  MNT for Treatment/Condition;Medical Diagnosis     Endocrine  DM2     Signs/Symptoms (evidenced by)  Demonstrated Information Deficit;Biochemical     Specific Labs Noted  HgbA1C                 Intervention Goal     Row Name 02/04/21 1659          Intervention Goal    General  Improved nutrition related lab(s);Provide information regarding MNT for treatment/condition;Disease management/therapy     Weight  Appropriate weight loss           Nutrition Prescription     Row Name 02/04/21 1700          Nutrition Prescription PO    PO Prescription  Begin/change diet     Common Modifiers  Consistent Carbohydrate         Education/Evaluation     Row Name 02/04/21 1700          Education    Education  Provided education regarding;Education topics;Advised regarding habits/behavior     Provided education regarding  Key food habit change;Medical diagnosis;Diet rationale     Education Topics  Basic nutrition;CHO counting;Diabetes;Gradual weight loss;Low fat     Advised Regarding Habits/Behavior  Food choices;Label reading;Appropriate beverage;Appropriate portions;Snacks;Eating pattern;Meal planning;Self monitor        Monitor/Evaluation    Monitor  Per protocol     Education Follow-up  [employer] written materials mailed to address provided by Pt; RD contact information provided if questions/concerns arise           Electronically signed by:  Carmen Moy RDN, LD  02/04/21 17:03 CST

## 2021-02-05 DIAGNOSIS — E11.65 TYPE 2 DIABETES MELLITUS WITH HYPERGLYCEMIA, WITHOUT LONG-TERM CURRENT USE OF INSULIN (HCC): ICD-10-CM

## 2021-02-09 RX ORDER — GLYBURIDE 5 MG/1
TABLET ORAL
Qty: 270 TABLET | Refills: 3 | Status: SHIPPED | OUTPATIENT
Start: 2021-02-09 | End: 2021-10-12

## 2021-02-15 ENCOUNTER — CLINICAL SUPPORT (OUTPATIENT)
Dept: INTERNAL MEDICINE | Facility: CLINIC | Age: 76
End: 2021-02-15

## 2021-02-15 DIAGNOSIS — D51.0 PERNICIOUS ANEMIA: ICD-10-CM

## 2021-02-15 PROCEDURE — 96372 THER/PROPH/DIAG INJ SC/IM: CPT | Performed by: NURSE PRACTITIONER

## 2021-02-15 RX ADMIN — CYANOCOBALAMIN 1000 MCG: 1000 INJECTION, SOLUTION INTRAMUSCULAR; SUBCUTANEOUS at 09:33

## 2021-02-25 ENCOUNTER — OFFICE VISIT (OUTPATIENT)
Dept: INTERNAL MEDICINE | Facility: CLINIC | Age: 76
End: 2021-02-25

## 2021-02-25 VITALS
SYSTOLIC BLOOD PRESSURE: 150 MMHG | HEIGHT: 66 IN | WEIGHT: 196 LBS | BODY MASS INDEX: 31.5 KG/M2 | TEMPERATURE: 97.7 F | HEART RATE: 83 BPM | OXYGEN SATURATION: 99 % | DIASTOLIC BLOOD PRESSURE: 82 MMHG

## 2021-02-25 DIAGNOSIS — D51.0 PERNICIOUS ANEMIA: ICD-10-CM

## 2021-02-25 DIAGNOSIS — E11.65 TYPE 2 DIABETES MELLITUS WITH HYPERGLYCEMIA, WITHOUT LONG-TERM CURRENT USE OF INSULIN (HCC): Primary | ICD-10-CM

## 2021-02-25 DIAGNOSIS — R12 CHRONIC HEARTBURN: ICD-10-CM

## 2021-02-25 PROCEDURE — 99213 OFFICE O/P EST LOW 20 MIN: CPT | Performed by: NURSE PRACTITIONER

## 2021-02-25 PROCEDURE — 96372 THER/PROPH/DIAG INJ SC/IM: CPT | Performed by: NURSE PRACTITIONER

## 2021-02-25 RX ADMIN — CYANOCOBALAMIN 1000 MCG: 1000 INJECTION, SOLUTION INTRAMUSCULAR; SUBCUTANEOUS at 11:07

## 2021-02-25 NOTE — PROGRESS NOTES
Chief Complaint  Diabetes    Subjective          Michael Novak presents to [facility]  Mr. Novak is a pleasant 74 yo male who presents for diabetes follow up. Unable to complete A1C in the office related to short supplies, will complete through the lab and call patient tomorrow for therapy adjustment as needed. Patient reports rare episodes of blood sugars less than 50 and reports drinking 4-6 oz of orange juice and blood sugar improves 100-120. He reports weekly issues and is on days he is more active at work. He does not currently bring snacks during the day and reports blood sugar  Late morning.     Diabetes  He has type 2 diabetes mellitus. His disease course has been fluctuating. Hypoglycemia symptoms include hunger and sleepiness. Pertinent negatives for hypoglycemia include no confusion, dizziness, headaches, nervousness/anxiousness or tremors. There are no diabetic associated symptoms. Pertinent negatives for diabetes include no chest pain, no fatigue and no weakness. There are no hypoglycemic complications. There are no diabetic complications. Risk factors for coronary artery disease include diabetes mellitus, hypertension, male sex and obesity. Current diabetic treatment includes diet and oral agent (dual therapy). He is compliant with treatment most of the time. His weight is fluctuating minimally. He is following a generally healthy diet. Meal planning includes avoidance of concentrated sweets. He has not had a previous visit with a dietitian. He participates in exercise daily. His home blood glucose trend is fluctuating minimally. His breakfast blood glucose is taken between 7-8 am. His breakfast blood glucose range is generally 110-130 mg/dl. His lunch blood glucose is taken between 11-12 pm. His lunch blood glucose range is generally 70-90 mg/dl. His dinner blood glucose is taken between 6-7 pm. His dinner blood glucose range is generally 130-140 mg/dl.  "An ACE inhibitor/angiotensin II receptor blocker is not being taken. He does not see a podiatrist.Eye exam is not current.       Review of Systems   Constitutional: Negative for activity change, appetite change, fatigue and fever.   HENT: Negative for congestion, ear discharge, ear pain, facial swelling, rhinorrhea, sinus pressure, sneezing, tinnitus and trouble swallowing.    Eyes: Negative for discharge and visual disturbance.   Respiratory: Negative for chest tightness, shortness of breath, wheezing and stridor.    Cardiovascular: Negative for chest pain, palpitations and leg swelling.   Gastrointestinal: Negative for abdominal distention, abdominal pain, constipation, diarrhea and nausea.   Endocrine: Negative for cold intolerance and heat intolerance.   Genitourinary: Negative for difficulty urinating, flank pain, frequency, hematuria and urgency.   Musculoskeletal: Negative for arthralgias, joint swelling, myalgias and neck pain.   Skin: Negative for color change and rash.   Allergic/Immunologic: Negative for environmental allergies.   Neurological: Negative for dizziness, tremors, weakness and confusion.   Hematological: Negative for adenopathy.   Psychiatric/Behavioral: Negative for decreased concentration and sleep disturbance. The patient is not nervous/anxious.      Objective   Vital Signs:   /82 (BP Location: Left arm)   Pulse 83   Temp 97.7 °F (36.5 °C)   Ht 167.6 cm (66\")   Wt 88.9 kg (196 lb)   SpO2 99%   BMI 31.64 kg/m²     Physical Exam  Vitals signs and nursing note reviewed.   Constitutional:       Appearance: Normal appearance. He is well-developed. He is obese.   HENT:      Head: Normocephalic and atraumatic.      Right Ear: Hearing and external ear normal.      Left Ear: Hearing and external ear normal.      Nose: Nose normal.      Mouth/Throat:      Lips: Pink.      Mouth: Mucous membranes are moist.      Pharynx: Oropharynx is clear. Uvula midline.   Eyes:      General: Lids are " normal. Lids are everted, no foreign bodies appreciated.      Conjunctiva/sclera: Conjunctivae normal.      Pupils: Pupils are equal, round, and reactive to light.   Neck:      Musculoskeletal: Normal range of motion and neck supple.      Thyroid: No thyromegaly.   Cardiovascular:      Rate and Rhythm: Normal rate and regular rhythm.      Pulses: Normal pulses.      Heart sounds: Normal heart sounds.   Pulmonary:      Effort: Pulmonary effort is normal.      Breath sounds: Normal breath sounds.   Abdominal:      General: Abdomen is protuberant. Bowel sounds are normal.      Palpations: Abdomen is soft.      Tenderness: There is no abdominal tenderness.   Lymphadenopathy:      Cervical: No cervical adenopathy.   Skin:     General: Skin is warm and dry.      Capillary Refill: Capillary refill takes less than 2 seconds.   Neurological:      General: No focal deficit present.      Mental Status: He is alert and oriented to person, place, and time.   Psychiatric:         Attention and Perception: Attention normal.         Mood and Affect: Mood normal.         Speech: Speech normal.         Behavior: Behavior normal. Behavior is cooperative.         Thought Content: Thought content normal.        Result Review :     Common labs    Common Labsle 9/23/20 9/23/20 9/23/20 10/10/20 11/25/20 11/25/20 11/25/20 11/25/20 11/25/20 11/25/20 11/25/20    0807 0807 0807  0721 0721 0721 0721 0721 0721 0822   Glucose  87            Glucose     135 (A)         BUN  17   23         Creatinine  0.9   0.90         eGFR Non  Am  >60   82         eGFR  Am  >59   99         Sodium  142   138         Potassium  4.6   4.2         Chloride  106   105         Calcium  9.1   8.8         Total Protein     7.0         Albumin  4.1   4.30         Total Bilirubin  0.3   0.2         Alkaline Phosphatase  55   52         AST (SGOT)  33   25         ALT (SGPT)  31   25         WBC 3.3 (A)   8.1  4.82        Hemoglobin 11.6 (A)   10.8 (A)   11.5 (A)        Hematocrit 36.7 (A)   33.4 (A)  34.4 (A)        Platelets 197   204  203        Total Cholesterol   133 (A)    181       Triglycerides   148    127       HDL Cholesterol   41 (A)    56       LDL Cholesterol    62    103 (A)       Hemoglobin A1C           7.2   Microalbumin, Urine         61.4     PSA          0.236    Uric Acid        4.6      (A) Abnormal value       Comments are available for some flowsheets but are not being displayed.              Assessment and Plan      Diagnoses and all orders for this visit:    1. Type 2 diabetes mellitus with hyperglycemia, without long-term current use of insulin (CMS/[facility]) (Primary)  -     Hemoglobin A1c    2. Pernicious anemia    3. Chronic heartburn      I spent 20 minutes caring for  on this date of service. This time includes time spent by me in the following activities:preparing for the visit, reviewing tests, obtaining and/or reviewing a separately obtained history, performing a medically appropriate examination and/or evaluation , counseling and educating the patient/family/caregiver, ordering medications, tests, or procedures, documenting information in the medical record and independently interpreting results and communicating that information with the patient/family/caregiver     Follow Up     Return in about 6 months (around 8/25/2021) for Annual physical, Medicare Wellness.     Will check a1c today. Advised the patient to take snack with him if more active. Advised to eat snack 15-30 minutes prior to exercise to prevent low blood sugars. Patient complete vitamin B12 shot for anemia. Last CBC was stable. Patient has discontinued self off prilosec, will continue holding medicine and advised to resume if symptoms worsen. Patient will follow up in 6 months for annual exam    Patient was given instructions and counseling regarding his condition or for health maintenance advice. Please see specific information pulled into the AVS if  appropriate.

## 2021-02-26 LAB — HBA1C MFR BLD: 8 % (ref 4.8–5.6)

## 2021-03-15 ENCOUNTER — CLINICAL SUPPORT (OUTPATIENT)
Dept: INTERNAL MEDICINE | Facility: CLINIC | Age: 76
End: 2021-03-15

## 2021-03-15 DIAGNOSIS — D51.0 PERNICIOUS ANEMIA: Primary | ICD-10-CM

## 2021-03-15 PROCEDURE — 96372 THER/PROPH/DIAG INJ SC/IM: CPT | Performed by: NURSE PRACTITIONER

## 2021-03-15 RX ADMIN — CYANOCOBALAMIN 1000 MCG: 1000 INJECTION, SOLUTION INTRAMUSCULAR; SUBCUTANEOUS at 08:14

## 2021-03-22 ENCOUNTER — OFFICE VISIT (OUTPATIENT)
Dept: CARDIOLOGY CLINIC | Age: 76
End: 2021-03-22
Payer: MEDICARE

## 2021-03-22 ENCOUNTER — CLINICAL SUPPORT (OUTPATIENT)
Dept: INTERNAL MEDICINE | Facility: CLINIC | Age: 76
End: 2021-03-22

## 2021-03-22 VITALS
SYSTOLIC BLOOD PRESSURE: 122 MMHG | WEIGHT: 196 LBS | HEIGHT: 66 IN | DIASTOLIC BLOOD PRESSURE: 70 MMHG | BODY MASS INDEX: 31.5 KG/M2 | HEART RATE: 72 BPM

## 2021-03-22 DIAGNOSIS — E78.2 MIXED HYPERLIPIDEMIA: ICD-10-CM

## 2021-03-22 DIAGNOSIS — D51.0 PERNICIOUS ANEMIA: ICD-10-CM

## 2021-03-22 DIAGNOSIS — I25.10 CORONARY ARTERY DISEASE INVOLVING NATIVE CORONARY ARTERY OF NATIVE HEART WITHOUT ANGINA PECTORIS: ICD-10-CM

## 2021-03-22 DIAGNOSIS — Z95.1 STATUS POST AORTO-CORONARY ARTERY BYPASS GRAFT: ICD-10-CM

## 2021-03-22 PROCEDURE — 96372 THER/PROPH/DIAG INJ SC/IM: CPT | Performed by: NURSE PRACTITIONER

## 2021-03-22 PROCEDURE — 99214 OFFICE O/P EST MOD 30 MIN: CPT | Performed by: INTERNAL MEDICINE

## 2021-03-22 RX ORDER — LOSARTAN POTASSIUM 25 MG/1
12.5 TABLET ORAL DAILY
Qty: 90 TABLET | Refills: 3 | Status: SHIPPED | OUTPATIENT
Start: 2021-03-22

## 2021-03-22 RX ORDER — LOSARTAN POTASSIUM 25 MG/1
12.5 TABLET ORAL DAILY
COMMUNITY
End: 2021-03-22 | Stop reason: SDUPTHER

## 2021-03-22 RX ADMIN — CYANOCOBALAMIN 1000 MCG: 1000 INJECTION, SOLUTION INTRAMUSCULAR; SUBCUTANEOUS at 11:54

## 2021-03-22 ASSESSMENT — ENCOUNTER SYMPTOMS
EYES NEGATIVE: 1
DIARRHEA: 0
GASTROINTESTINAL NEGATIVE: 1
VOMITING: 0
NAUSEA: 0
RESPIRATORY NEGATIVE: 1
SHORTNESS OF BREATH: 0

## 2021-03-22 NOTE — PROGRESS NOTES
[facility] Progress Note                            Date:  3/22/2021  Patient: Marcello Shannon. Age:  67 y.o., 1945      Reason for evaluation:         SUBJECTIVE:    Returns today follow-up assessment follow-up for ischemic heart disease hyperlipidemia previous CABG. Overall feeling well remains active denies anginal chest pain or limiting dyspnea. In September LDL cholesterol 62. Stress test 9/28/2020 normal ejection fraction normal perfusion study. Echocardiogram same date mild MR trace TR normal left ventricular function. Review of Systems   Constitutional: Negative. Negative for chills, fever and unexpected weight change. HENT: Negative. Eyes: Negative. Respiratory: Negative. Negative for shortness of breath. Cardiovascular: Negative. Negative for chest pain. Gastrointestinal: Negative. Negative for diarrhea, nausea and vomiting. Endocrine: Negative. Genitourinary: Negative. Musculoskeletal: Negative. Skin: Negative. Neurological: Negative. All other systems reviewed and are negative. OBJECTIVE:     /70   Pulse 72   Ht 5' 6\" (1.676 m)   Wt 196 lb (88.9 kg)   BMI 31.64 kg/m²     Labs:   CBC: No results for input(s): WBC, HGB, HCT, PLT in the last 72 hours. BMP:No results for input(s): NA, K, CO2, BUN, CREATININE, LABGLOM, GLUCOSE in the last 72 hours. BNP: No results for input(s): BNP in the last 72 hours. PT/INR: No results for input(s): PROTIME, INR in the last 72 hours. APTT:No results for input(s): APTT in the last 72 hours. CARDIAC ENZYMES:No results for input(s): CKTOTAL, CKMB, CKMBINDEX, TROPONINI in the last 72 hours. FASTING LIPID PANEL:  Lab Results   Component Value Date    HDL 41 09/23/2020    LDLCALC 62 09/23/2020    TRIG 148 09/23/2020     LIVER PROFILE:No results for input(s): AST, ALT, LABALBU in the last 72 hours.         Past Medical History:   Diagnosis Date    CAD (coronary artery disease)     Chronic back pain     Diverticulitis     Hip pain     right    Hyperlipidemia     Hypertension     Knee pain     Shoulder pain     Type II or unspecified type diabetes mellitus without mention of complication, not stated as uncontrolled      Past Surgical History:   Procedure Laterality Date    CORONARY ARTERY BYPASS GRAFT      LA COLONOSCOPY W/BIOPSY SINGLE/MULTIPLE N/A 12/15/2017    Dr Shannan MUÑIZ (-) dysplasia x 1, HP x 1, mucosa--5 yr recall    LA EGD TRANSORAL BIOPSY SINGLE/MULTIPLE N/A 12/15/2017    Dr Darryle Prude (-)    SHOULDER ARTHROSCOPY Left 3/16/2017    SHOULDER ARTHROSCOPIC SUBACROMIAL DECOMPRESSION, DISTAL CLAVICLE RESECTION, GLENUHUMERAL SYNOVECTOMY, OPEN ROTATOR CUFF REPAIR AND OPEN BICEPS TENODESIS LEFT  performed by Jessica Mares DO at [address] [place] [address]      age 9     Family History   Problem Relation Age of Onset    Cancer Mother     Heart Disease Father     Colon Cancer Neg Hx     Colon Polyps Neg Hx     Liver Cancer Neg Hx     Liver Disease Neg Hx     Esophageal Cancer Neg Hx     Rectal Cancer Neg Hx     Stomach Cancer Neg Hx      Allergies   Allergen Reactions    Ace Inhibitors      Other reaction(s): Cough    Valium [Diazepam] Nausea Only     Current Outpatient Medications   Medication Sig Dispense Refill    losartan (COZAAR) 25 MG tablet Take 0.5 tablets by mouth daily 90 tablet 3    omeprazole (PRILOSEC) 20 MG delayed release capsule Take 20 mg by mouth daily      Aspirin Buf,MdWuq-LhXai-JgXlk, (BUFFERED ASPIRIN) 325 MG TABS Take 1 tablet by mouth once      glyBURIDE (DIABETA) 5 MG tablet Take 5 mg by mouth 2 times daily (with meals)      atorvastatin (LIPITOR) 20 MG tablet Take 20 mg by mouth daily      Multiple Vitamins-Minerals (MULTIVITAMIN PO) Take  by mouth daily.  metFORMIN (GLUCOPHAGE) 500 MG tablet Take 500 mg by mouth 2 times daily (with meals). No current facility-administered medications for this visit.       Social History     Socioeconomic History    Marital status:      Spouse name: Not on file    Number of children: Not on file    Years of education: Not on file    Highest education level: Not on file   Occupational History    Not on file   Social Needs    Financial resource strain: Not on file    Food insecurity     Worry: Not on file     Inability: Not on file    Transportation needs     Medical: Not on file     Non-medical: Not on file   Tobacco Use    Smoking status: Former Smoker     Packs/day: 0.50     Years: 10.00     Pack years: 5.00     Types: Cigarettes     Quit date: 65     Years since quittin.3    Smokeless tobacco: Never Used   Substance and Sexual Activity    Alcohol use: No    Drug use: No    Sexual activity: Not on file   Lifestyle    Physical activity     Days per week: Not on file     Minutes per session: Not on file    Stress: Not on file   Relationships    Social connections     Talks on phone: Not on file     Gets together: Not on file     Attends Yazdanism service: Not on file     Active member of club or organization: Not on file     Attends meetings of clubs or organizations: Not on file     Relationship status: Not on file    Intimate partner violence     Fear of current or ex partner: Not on file     Emotionally abused: Not on file     Physically abused: Not on file     Forced sexual activity: Not on file   Other Topics Concern    Not on file   Social History Narrative    Not on file       Physical Examination:  /70   Pulse 72   Ht 5' 6\" (1.676 m)   Wt 196 lb (88.9 kg)   BMI 31.64 kg/m²   Physical Exam  Constitutional:       Appearance: He is well-developed. Neck:      Vascular: No carotid bruit or JVD. Cardiovascular:      Rate and Rhythm: Normal rate and regular rhythm. Heart sounds: Normal heart sounds. No murmur. No friction rub. No gallop. Pulmonary:      Effort: Pulmonary effort is normal. No respiratory distress.       Breath sounds: Normal breath sounds. No wheezing or rales. Abdominal:      General: There is no distension. Tenderness: There is no abdominal tenderness. Lymphadenopathy:      Cervical: No cervical adenopathy. Skin:     General: Skin is warm and dry. ASSESSMENT:     Diagnosis Orders   1. Coronary artery disease involving native coronary artery of native heart without angina pectoris  Lipid Panel   2. Status post aorto-coronary artery bypass graft  Lipid Panel   3. Mixed hyperlipidemia  Lipid Panel       PLAN:  Orders Placed This Encounter   Procedures    Lipid Panel     Orders Placed This Encounter   Medications    losartan (COZAAR) 25 MG tablet     Sig: Take 0.5 tablets by mouth daily     Dispense:  90 tablet     Refill:  3         1. Continue present medications  2. Recommend follow-up assessment in 6 months with fasting lipid profile    Return in about 6 months (around 9/22/2021) for return to Dr. Terri Moyer only. Sumaya Herron MD 3/22/2021 10:46 AM CDT    [facility]      Thisdictation was generated by voice recognition computer software. Although all attempts are made to edit the dictation for accuracy, there may be errors in the transcription that are not intended.

## 2021-04-02 ENCOUNTER — CLINICAL SUPPORT (OUTPATIENT)
Dept: INTERNAL MEDICINE | Facility: CLINIC | Age: 76
End: 2021-04-02

## 2021-04-02 DIAGNOSIS — D51.0 PERNICIOUS ANEMIA: ICD-10-CM

## 2021-04-02 PROCEDURE — 96372 THER/PROPH/DIAG INJ SC/IM: CPT | Performed by: NURSE PRACTITIONER

## 2021-04-02 RX ADMIN — CYANOCOBALAMIN 1000 MCG: 1000 INJECTION, SOLUTION INTRAMUSCULAR; SUBCUTANEOUS at 08:27

## 2021-04-16 ENCOUNTER — CLINICAL SUPPORT (OUTPATIENT)
Dept: INTERNAL MEDICINE | Facility: CLINIC | Age: 76
End: 2021-04-16

## 2021-04-16 DIAGNOSIS — D51.0 PERNICIOUS ANEMIA: ICD-10-CM

## 2021-04-16 PROCEDURE — 96372 THER/PROPH/DIAG INJ SC/IM: CPT | Performed by: NURSE PRACTITIONER

## 2021-04-16 RX ADMIN — CYANOCOBALAMIN 1000 MCG: 1000 INJECTION, SOLUTION INTRAMUSCULAR; SUBCUTANEOUS at 08:20

## 2021-04-30 ENCOUNTER — CLINICAL SUPPORT (OUTPATIENT)
Dept: INTERNAL MEDICINE | Facility: CLINIC | Age: 76
End: 2021-04-30

## 2021-04-30 DIAGNOSIS — D51.0 PERNICIOUS ANEMIA: ICD-10-CM

## 2021-04-30 PROCEDURE — 96372 THER/PROPH/DIAG INJ SC/IM: CPT | Performed by: NURSE PRACTITIONER

## 2021-04-30 RX ADMIN — CYANOCOBALAMIN 1000 MCG: 1000 INJECTION, SOLUTION INTRAMUSCULAR; SUBCUTANEOUS at 10:07

## 2021-05-14 ENCOUNTER — CLINICAL SUPPORT (OUTPATIENT)
Dept: INTERNAL MEDICINE | Facility: CLINIC | Age: 76
End: 2021-05-14

## 2021-05-14 DIAGNOSIS — D51.0 PERNICIOUS ANEMIA: ICD-10-CM

## 2021-05-14 PROCEDURE — 96372 THER/PROPH/DIAG INJ SC/IM: CPT | Performed by: INTERNAL MEDICINE

## 2021-05-14 RX ADMIN — CYANOCOBALAMIN 1000 MCG: 1000 INJECTION, SOLUTION INTRAMUSCULAR; SUBCUTANEOUS at 08:07

## 2021-06-02 ENCOUNTER — CLINICAL SUPPORT (OUTPATIENT)
Dept: INTERNAL MEDICINE | Facility: CLINIC | Age: 76
End: 2021-06-02

## 2021-06-02 DIAGNOSIS — D51.0 PERNICIOUS ANEMIA: ICD-10-CM

## 2021-06-02 PROCEDURE — 96372 THER/PROPH/DIAG INJ SC/IM: CPT | Performed by: INTERNAL MEDICINE

## 2021-06-02 RX ADMIN — CYANOCOBALAMIN 1000 MCG: 1000 INJECTION, SOLUTION INTRAMUSCULAR; SUBCUTANEOUS at 08:57

## 2021-06-15 ENCOUNTER — CLINICAL SUPPORT (OUTPATIENT)
Dept: INTERNAL MEDICINE | Facility: CLINIC | Age: 76
End: 2021-06-15

## 2021-06-15 DIAGNOSIS — D51.0 PERNICIOUS ANEMIA: ICD-10-CM

## 2021-06-15 PROCEDURE — 96372 THER/PROPH/DIAG INJ SC/IM: CPT | Performed by: NURSE PRACTITIONER

## 2021-06-15 RX ADMIN — CYANOCOBALAMIN 1000 MCG: 1000 INJECTION, SOLUTION INTRAMUSCULAR; SUBCUTANEOUS at 09:38

## 2021-06-30 ENCOUNTER — CLINICAL SUPPORT (OUTPATIENT)
Dept: INTERNAL MEDICINE | Facility: CLINIC | Age: 76
End: 2021-06-30

## 2021-06-30 DIAGNOSIS — D51.0 PERNICIOUS ANEMIA: ICD-10-CM

## 2021-06-30 PROCEDURE — 96372 THER/PROPH/DIAG INJ SC/IM: CPT | Performed by: NURSE PRACTITIONER

## 2021-06-30 RX ADMIN — CYANOCOBALAMIN 1000 MCG: 1000 INJECTION, SOLUTION INTRAMUSCULAR; SUBCUTANEOUS at 09:19

## 2021-07-16 ENCOUNTER — CLINICAL SUPPORT (OUTPATIENT)
Dept: INTERNAL MEDICINE | Facility: CLINIC | Age: 76
End: 2021-07-16

## 2021-07-16 DIAGNOSIS — D51.0 PERNICIOUS ANEMIA: ICD-10-CM

## 2021-07-16 PROCEDURE — 96372 THER/PROPH/DIAG INJ SC/IM: CPT | Performed by: NURSE PRACTITIONER

## 2021-07-22 ENCOUNTER — TELEPHONE (OUTPATIENT)
Dept: INTERNAL MEDICINE | Facility: CLINIC | Age: 76
End: 2021-07-22

## 2021-07-22 DIAGNOSIS — N52.9 ERECTILE DYSFUNCTION OF ORGANIC ORIGIN: Primary | ICD-10-CM

## 2021-07-22 RX ORDER — SILDENAFIL 100 MG/1
100 TABLET, FILM COATED ORAL DAILY PRN
Qty: 30 TABLET | Refills: 2 | Status: SHIPPED | OUTPATIENT
Start: 2021-07-22 | End: 2022-08-26 | Stop reason: SDUPTHER

## 2021-07-22 NOTE — TELEPHONE ENCOUNTER
PT CALLED ASKING IF HE COULD HAVE A PRESCRIPTION FOR SILDENAFIL SENT TO [facility]; PT STATES HE WAS PREVIOUSLY ON VIAGRA BUT CANNOT AFFORD THE BRAND NAME VERSION    CALLBACK [phone]    SAL BECKMAN [address] [place] [address] AT  60 - [phone] ph - [phone] FX

## 2021-07-22 NOTE — TELEPHONE ENCOUNTER
Okay to send, educate that if he develops chest pain and goes to the ER, will need to tell them if he has used this medicine within 24 hours. Thank you.

## 2021-07-22 NOTE — TELEPHONE ENCOUNTER
I don't see on his current medication list, but he was on Viagra 100mg #36 for a 90 day supply in [employer]. That was filled in 2019 with 1 refill. Ok to send generic to [facility]?

## 2021-07-28 DIAGNOSIS — E55.9 VITAMIN D DEFICIENCY: Primary | ICD-10-CM

## 2021-07-29 RX ORDER — ERGOCALCIFEROL 1.25 MG/1
CAPSULE ORAL
Qty: 5 CAPSULE | Refills: 11 | Status: SHIPPED | OUTPATIENT
Start: 2021-07-29 | End: 2022-08-08 | Stop reason: SDUPTHER

## 2021-08-02 ENCOUNTER — CLINICAL SUPPORT (OUTPATIENT)
Dept: INTERNAL MEDICINE | Facility: CLINIC | Age: 76
End: 2021-08-02

## 2021-08-02 DIAGNOSIS — D51.0 PERNICIOUS ANEMIA: ICD-10-CM

## 2021-08-02 PROCEDURE — 96372 THER/PROPH/DIAG INJ SC/IM: CPT | Performed by: INTERNAL MEDICINE

## 2021-08-02 RX ADMIN — CYANOCOBALAMIN 1000 MCG: 1000 INJECTION, SOLUTION INTRAMUSCULAR; SUBCUTANEOUS at 11:56

## 2021-08-09 RX ORDER — ATORVASTATIN CALCIUM 40 MG/1
TABLET, FILM COATED ORAL
Qty: 90 TABLET | Refills: 3 | Status: SHIPPED | OUTPATIENT
Start: 2021-08-09 | End: 2022-08-08 | Stop reason: SDUPTHER

## 2021-08-13 ENCOUNTER — CLINICAL SUPPORT (OUTPATIENT)
Dept: INTERNAL MEDICINE | Facility: CLINIC | Age: 76
End: 2021-08-13

## 2021-08-13 DIAGNOSIS — D51.0 PERNICIOUS ANEMIA: ICD-10-CM

## 2021-08-13 PROCEDURE — 96372 THER/PROPH/DIAG INJ SC/IM: CPT | Performed by: NURSE PRACTITIONER

## 2021-08-13 RX ADMIN — CYANOCOBALAMIN 1000 MCG: 1000 INJECTION, SOLUTION INTRAMUSCULAR; SUBCUTANEOUS at 14:23

## 2021-08-26 ENCOUNTER — LAB (OUTPATIENT)
Dept: INTERNAL MEDICINE | Facility: CLINIC | Age: 76
End: 2021-08-26

## 2021-08-26 DIAGNOSIS — E78.2 MIXED HYPERLIPIDEMIA: ICD-10-CM

## 2021-08-26 DIAGNOSIS — C61 PROSTATE CANCER (HCC): ICD-10-CM

## 2021-08-26 DIAGNOSIS — E55.9 VITAMIN D DEFICIENCY: Primary | ICD-10-CM

## 2021-08-26 DIAGNOSIS — D51.0 PERNICIOUS ANEMIA: ICD-10-CM

## 2021-08-26 DIAGNOSIS — E11.65 TYPE 2 DIABETES MELLITUS WITH HYPERGLYCEMIA, WITHOUT LONG-TERM CURRENT USE OF INSULIN (HCC): ICD-10-CM

## 2021-08-27 ENCOUNTER — OFFICE VISIT (OUTPATIENT)
Dept: INTERNAL MEDICINE | Facility: CLINIC | Age: 76
End: 2021-08-27

## 2021-08-27 VITALS
WEIGHT: 197 LBS | HEART RATE: 82 BPM | HEIGHT: 66 IN | BODY MASS INDEX: 31.66 KG/M2 | TEMPERATURE: 97.5 F | SYSTOLIC BLOOD PRESSURE: 134 MMHG | OXYGEN SATURATION: 98 % | DIASTOLIC BLOOD PRESSURE: 80 MMHG

## 2021-08-27 DIAGNOSIS — E66.9 OBESITY (BMI 30.0-34.9): ICD-10-CM

## 2021-08-27 DIAGNOSIS — E11.65 TYPE 2 DIABETES MELLITUS WITH HYPERGLYCEMIA, WITHOUT LONG-TERM CURRENT USE OF INSULIN (HCC): ICD-10-CM

## 2021-08-27 DIAGNOSIS — I10 ESSENTIAL HYPERTENSION: ICD-10-CM

## 2021-08-27 DIAGNOSIS — Z00.00 MEDICARE ANNUAL WELLNESS VISIT, SUBSEQUENT: Primary | ICD-10-CM

## 2021-08-27 DIAGNOSIS — Z28.21 COVID-19 VACCINATION DECLINED: ICD-10-CM

## 2021-08-27 PROBLEM — I25.10 CORONARY ARTERY DISEASE INVOLVING NATIVE CORONARY ARTERY: Status: ACTIVE | Noted: 2021-03-22

## 2021-08-27 PROBLEM — Z95.1 STATUS POST AORTO-CORONARY ARTERY BYPASS GRAFT: Status: ACTIVE | Noted: 2021-03-22

## 2021-08-27 PROBLEM — E78.2 MIXED HYPERLIPIDEMIA: Status: ACTIVE | Noted: 2021-03-22

## 2021-08-27 LAB
25(OH)D3+25(OH)D2 SERPL-MCNC: 46.6 NG/ML (ref 30–100)
ALBUMIN SERPL-MCNC: 4.3 G/DL (ref 3.5–5.2)
ALBUMIN/GLOB SERPL: 1.4 G/DL
ALP SERPL-CCNC: 60 U/L (ref 39–117)
ALT SERPL-CCNC: 27 U/L (ref 1–41)
APPEARANCE UR: CLEAR
AST SERPL-CCNC: 30 U/L (ref 1–40)
BACTERIA #/AREA URNS HPF: NORMAL /HPF
BASOPHILS # BLD AUTO: 0.04 10*3/MM3 (ref 0–0.2)
BASOPHILS NFR BLD AUTO: 0.8 % (ref 0–1.5)
BILIRUB SERPL-MCNC: 0.4 MG/DL (ref 0–1.2)
BILIRUB UR QL STRIP: NEGATIVE
BUN SERPL-MCNC: 14 MG/DL (ref 8–23)
BUN/CREAT SERPL: 15.6 (ref 7–25)
CALCIUM SERPL-MCNC: 9.5 MG/DL (ref 8.6–10.5)
CASTS URNS MICRO: NORMAL
CHLORIDE SERPL-SCNC: 100 MMOL/L (ref 98–107)
CHOLEST SERPL-MCNC: 206 MG/DL (ref 0–200)
CO2 SERPL-SCNC: 23.7 MMOL/L (ref 22–29)
COLOR UR: YELLOW
CREAT SERPL-MCNC: 0.9 MG/DL (ref 0.76–1.27)
EOSINOPHIL # BLD AUTO: 0.12 10*3/MM3 (ref 0–0.4)
EOSINOPHIL NFR BLD AUTO: 2.5 % (ref 0.3–6.2)
EPI CELLS #/AREA URNS HPF: NORMAL /HPF
ERYTHROCYTE [DISTWIDTH] IN BLOOD BY AUTOMATED COUNT: 14.3 % (ref 12.3–15.4)
GLOBULIN SER CALC-MCNC: 3.1 GM/DL
GLUCOSE SERPL-MCNC: 156 MG/DL (ref 65–99)
GLUCOSE UR QL: NEGATIVE
HBA1C MFR BLD: 8.6 % (ref 4.8–5.6)
HCT VFR BLD AUTO: 36.4 % (ref 37.5–51)
HDLC SERPL-MCNC: 47 MG/DL (ref 40–60)
HGB BLD-MCNC: 12.3 G/DL (ref 13–17.7)
HGB UR QL STRIP: NEGATIVE
IMM GRANULOCYTES # BLD AUTO: 0.02 10*3/MM3 (ref 0–0.05)
IMM GRANULOCYTES NFR BLD AUTO: 0.4 % (ref 0–0.5)
KETONES UR QL STRIP: NEGATIVE
LDLC SERPL CALC-MCNC: 116 MG/DL (ref 0–100)
LEUKOCYTE ESTERASE UR QL STRIP: NEGATIVE
LYMPHOCYTES # BLD AUTO: 1.41 10*3/MM3 (ref 0.7–3.1)
LYMPHOCYTES NFR BLD AUTO: 28.8 % (ref 19.6–45.3)
MCH RBC QN AUTO: 27.8 PG (ref 26.6–33)
MCHC RBC AUTO-ENTMCNC: 33.8 G/DL (ref 31.5–35.7)
MCV RBC AUTO: 82.4 FL (ref 79–97)
MICROALBUMIN UR-MCNC: 169 UG/ML
MONOCYTES # BLD AUTO: 0.62 10*3/MM3 (ref 0.1–0.9)
MONOCYTES NFR BLD AUTO: 12.7 % (ref 5–12)
NEUTROPHILS # BLD AUTO: 2.68 10*3/MM3 (ref 1.7–7)
NEUTROPHILS NFR BLD AUTO: 54.8 % (ref 42.7–76)
NITRITE UR QL STRIP: NEGATIVE
NRBC BLD AUTO-RTO: 0 /100 WBC (ref 0–0.2)
PH UR STRIP: 6 [PH] (ref 5–8)
PLATELET # BLD AUTO: 203 10*3/MM3 (ref 140–450)
POTASSIUM SERPL-SCNC: 4.6 MMOL/L (ref 3.5–5.2)
PROT SERPL-MCNC: 7.4 G/DL (ref 6–8.5)
PROT UR QL STRIP: ABNORMAL
PSA SERPL-MCNC: 0.33 NG/ML (ref 0–4)
RBC # BLD AUTO: 4.42 10*6/MM3 (ref 4.14–5.8)
RBC #/AREA URNS HPF: NORMAL /HPF
SODIUM SERPL-SCNC: 139 MMOL/L (ref 136–145)
SP GR UR: 1.02 (ref 1–1.03)
TRIGL SERPL-MCNC: 249 MG/DL (ref 0–150)
TSH SERPL DL<=0.005 MIU/L-ACNC: 3.86 UIU/ML (ref 0.27–4.2)
URATE SERPL-MCNC: 4.3 MG/DL (ref 3.4–7)
UROBILINOGEN UR STRIP-MCNC: ABNORMAL MG/DL
VLDLC SERPL CALC-MCNC: 43 MG/DL (ref 5–40)
WBC # BLD AUTO: 4.89 10*3/MM3 (ref 3.4–10.8)
WBC #/AREA URNS HPF: NORMAL /HPF

## 2021-08-27 PROCEDURE — G0439 PPPS, SUBSEQ VISIT: HCPCS | Performed by: NURSE PRACTITIONER

## 2021-08-27 PROCEDURE — 1126F AMNT PAIN NOTED NONE PRSNT: CPT | Performed by: NURSE PRACTITIONER

## 2021-08-27 PROCEDURE — 1159F MED LIST DOCD IN RCRD: CPT | Performed by: NURSE PRACTITIONER

## 2021-08-27 RX ORDER — SEMAGLUTIDE 1.34 MG/ML
0.25 INJECTION, SOLUTION SUBCUTANEOUS WEEKLY
Qty: 1 PEN | Refills: 2 | Status: SHIPPED | OUTPATIENT
Start: 2021-08-27 | End: 2021-12-20

## 2021-08-27 RX ORDER — LOSARTAN POTASSIUM 50 MG/1
50 TABLET ORAL DAILY
Qty: 90 TABLET | Refills: 3 | Status: SHIPPED | OUTPATIENT
Start: 2021-08-27 | End: 2022-08-08 | Stop reason: SDUPTHER

## 2021-08-27 NOTE — PROGRESS NOTES
The ABCs of the Annual Wellness Visit  Subsequent Medicare Wellness Visit    Chief Complaint   Patient presents with   • Medicare Wellness-subsequent   • Diabetes       Subjective   History of Present Illness:  Michael Novak is a 75 y.o. male who presents for a Subsequent Medicare Wellness Visit.  Patient managed well right now.  His A1c was 8 last visit was 8.6.  Fasting glucose increased from 135-156.  He reports that he is currently taking his medications as prescribed and denies missing or skipping doses.  He does check his blood sugars every day sometimes twice a day reports normal blood sugar readings between 110s and 120s in the morning and no higher than 150 in the evenings.  Patient has also had slight increase in his LDL cholesterol from 10 3-1 16.  He reports that he is not eating a lot of concentrated sweets diet but is eating potatoes often.  He did not bring blood sugar logs for me to review today.    Patient's other labs are stable from last year.  He does have pernicious anemia and is still completing monthly vitamin B12 injections.  His hemoglobin and hematocrit have improved last year with regular dosing again.  He had gotten out of monthly dosing last year related to Covid.    We had a discussion today about Covid vaccine.  Patient has been denying vaccination related to some misinformation.  We did discuss in detail about Covid vaccines and how they work we will be getting boosters for a while related to Covid variance.  Patient is still declining on Covid vaccination at this time.    HEALTH RISK ASSESSMENT    Recent Hospitalizations:  No hospitalization(s) within the last year.    Current Medical Providers:  Patient Care Team:  Bridget Corrales APRN as PCP - General (Nurse Practitioner)    Smoking Status:  Social History     Tobacco Use   Smoking Status Former Smoker   • Packs/day: 0.50   • Years: 12.00   • Pack years: 6.00   • Quit date:    • Years since quittin.6    Smokeless Tobacco Never Used       Alcohol Consumption:  Social History     Substance and Sexual Activity   Alcohol Use No       Depression Screen:   PHQ-2/PHQ-9 Depression Screening 8/27/2021   Little interest or pleasure in doing things 0   Feeling down, depressed, or hopeless 0   Total Score 0       Fall Risk Screen:  YO Fall Risk Assessment was completed, and patient is at LOW risk for falls.Assessment completed on:8/27/2021    Health Habits and Functional and Cognitive Screening:  Functional & Cognitive Status 8/27/2021   Do you have difficulty preparing food and eating? No   Do you have difficulty bathing yourself, getting dressed or grooming yourself? No   Do you have difficulty using the toilet? No   Do you have difficulty moving around from place to place? No   Do you have trouble with steps or getting out of a bed or a chair? No   Current Diet Well Balanced Diet   Dental Exam Up to date   Eye Exam Up to date   Exercise (times per week) 7 times per week   Current Exercises Include Walking   Current Exercise Activities Include -   Do you need help using the phone?  No   Are you deaf or do you have serious difficulty hearing?  Yes   Do you need help with transportation? Yes   Do you need help shopping? No   Do you need help preparing meals?  No   Do you need help with housework?  No   Do you need help with laundry? No   Do you need help taking your medications? No   Do you need help managing money? No   Do you ever drive or ride in a car without wearing a seat belt? No   Have you felt unusual stress, anger or loneliness in the last month? No   Who do you live with? Alone   If you need help, do you have trouble finding someone available to you? No   Have you been bothered in the last four weeks by sexual problems? No   Do you have difficulty concentrating, remembering or making decisions? Yes         Does the patient have evidence of cognitive impairment? No    Asprin use counseling:Taking ASA  appropriately as indicated    Age-appropriate Screening Schedule:  Refer to the list below for future screening recommendations based on patient's age, sex and/or medical conditions. Orders for these recommended tests are listed in the plan section. The patient has been provided with a written plan.    Health Maintenance   Topic Date Due   • TDAP/TD VACCINES (1 - Tdap) 12/01/2021 (Originally 9/29/1964)   • INFLUENZA VACCINE  10/01/2021   • DIABETIC EYE EXAM  10/20/2021   • HEMOGLOBIN A1C  02/26/2022   • LIPID PANEL  08/26/2022   • URINE MICROALBUMIN  08/26/2022   • ZOSTER VACCINE  Completed          The following portions of the patient's history were reviewed and updated as appropriate: allergies, current medications, past family history, past medical history, past social history, past surgical history and problem list.    Outpatient Medications Prior to Visit   Medication Sig Dispense Refill   • Accu-Chek Softclix Lancets lancets 1 each by Other route 2 (two) times a day. Use as instructed 200 each 3   • aspirin 325 MG tablet Take 325 mg by mouth Daily.     • atorvastatin (LIPITOR) 40 MG tablet Take 1 tablet by mouth once daily 90 tablet 3   • glucose blood (OneTouch Verio) test strip 1 each by Other route 2 (Two) Times a Day. Use as instructed 100 each 5   • glucose monitor monitoring kit 1 each 2 (Two) Times a Day. 1 each 0   • glyburide (DIAbeta) 5 MG tablet TAKE 2 TABLETS BY MOUTH BEFORE BREAKFAST AND 1 TABLET  BEFORE SUPPER 270 tablet 3   • metFORMIN (GLUCOPHAGE) 1000 MG tablet Take 1 tablet by mouth 2 (Two) Times a Day With Meals. 180 tablet 3   • Multiple Vitamins-Minerals (MULTIVITAMIN MEN) tablet Take 1 tablet by mouth Daily.     • omeprazole (priLOSEC) 20 MG capsule Take 1 capsule by mouth Daily. 90 capsule 3   • sildenafil (VIAGRA) 100 MG tablet Take 1 tablet by mouth Daily As Needed for Erectile Dysfunction. 30 tablet 2   • vitamin D (ERGOCALCIFEROL) 1.25 MG (47291 UT) capsule capsule Take 1 capsule by  mouth once a week 5 capsule 11   • losartan (COZAAR) 50 MG tablet Take 1 tablet by mouth Daily. 30 tablet 11     Facility-Administered Medications Prior to Visit   Medication Dose Route Frequency Provider Last Rate Last Admin   • cyanocobalamin injection 1,000 mcg  1,000 mcg Intramuscular Q28 Days Paramjit Shi MD   1,000 mcg at 08/13/21 1423       Patient Active Problem List   Diagnosis   • Prostate cancer (CMS/HCC)   • Encounter for consultation   • Use of leuprolide acetate (Lupron)   • Former smoker   • Encounter to discuss procedure   • History of radiation therapy   • Encounter for follow-up surveillance of prostate cancer   • Anemia   • Chronic heartburn   • Complete tear of left rotator cuff   • Family history of colonic polyps   • Pernicious anemia   • ACE-inhibitor cough   • Arthralgia of right shoulder region   • Back pain   • At low risk for fall   • Dizziness   • Elevated lipids   • Elevated PSA   • Erectile dysfunction of organic origin   • Fatigue   • Gastroesophageal reflux disease without esophagitis   • Knee pain   • Negative depression screening   • Vitamin D deficiency   • Coronary artery disease involving native coronary artery without angina pectoris   • Coronary artery disease involving native coronary artery   • Mixed hyperlipidemia   • Status post aorto-coronary artery bypass graft       Advanced Care Planning:  ACP discussion was declined by the patient. Patient does not have an advance directive, declines further assistance.    Review of Systems   Constitutional: Negative for activity change, appetite change, fatigue, fever and unexpected weight change.   HENT: Negative for congestion, ear discharge, ear pain, hearing loss, postnasal drip, rhinorrhea, sinus pressure, tinnitus, trouble swallowing and voice change.    Eyes: Negative for discharge and visual disturbance.   Respiratory: Negative for apnea, cough and shortness of breath.    Cardiovascular: Negative for chest pain,  "palpitations and leg swelling.   Gastrointestinal: Negative for abdominal pain, blood in stool, constipation and rectal pain.   Endocrine: Negative for cold intolerance, heat intolerance, polydipsia and polyphagia.   Genitourinary: Negative for decreased urine volume, difficulty urinating, frequency, hematuria and urgency.   Musculoskeletal: Negative for arthralgias, back pain, myalgias, neck pain and neck stiffness.   Skin: Negative for color change, rash and wound.   Allergic/Immunologic: Negative for environmental allergies.   Neurological: Negative for dizziness, speech difficulty, weakness, numbness and headaches.   Hematological: Negative for adenopathy. Does not bruise/bleed easily.   Psychiatric/Behavioral: Negative for agitation, confusion, decreased concentration and sleep disturbance. The patient is not nervous/anxious.        Compared to one year ago, the patient feels his physical health is the same.  Compared to one year ago, the patient feels his mental health is the same.    Reviewed chart for potential of high risk medication in the elderly: yes  Reviewed chart for potential of harmful drug interactions in the elderly:yes    Objective         Vitals:    08/27/21 1006   BP: 134/80   BP Location: Right arm   Pulse: 82   Temp: 97.5 °F (36.4 °C)   SpO2: 98%   Weight: 89.4 kg (197 lb)   Height: 167.6 cm (66\")   PainSc:   2   PainLoc: Finger       Body mass index is 31.8 kg/m².  Discussed the patient's BMI with him. The BMI is above average; BMI management plan is completed.    Physical Exam  Vitals and nursing note reviewed.   Constitutional:       Appearance: Normal appearance. He is well-developed. He is obese.   HENT:      Head: Normocephalic and atraumatic.      Right Ear: Tympanic membrane, ear canal and external ear normal. Decreased hearing noted.      Left Ear: Tympanic membrane, ear canal and external ear normal. Decreased hearing noted.      Ears:      Comments: Wearing hearing aids " bilaterally     Nose: Nose normal.      Mouth/Throat:      Lips: Pink.      Mouth: Mucous membranes are moist.      Dentition: Has dentures.      Pharynx: Oropharynx is clear. Uvula midline.      Comments: Bottom dentures  Eyes:      General: Lids are normal. Lids are everted, no foreign bodies appreciated. Vision grossly intact.      Extraocular Movements: Extraocular movements intact.      Conjunctiva/sclera: Conjunctivae normal.      Pupils: Pupils are equal, round, and reactive to light.   Neck:      Thyroid: No thyromegaly.      Vascular: No carotid bruit.      Trachea: Trachea and phonation normal.   Cardiovascular:      Rate and Rhythm: Normal rate and regular rhythm.      Pulses: Normal pulses.      Heart sounds: Normal heart sounds.   Pulmonary:      Effort: Pulmonary effort is normal.      Breath sounds: Normal breath sounds.   Abdominal:      General: Abdomen is protuberant. Bowel sounds are normal.      Palpations: Abdomen is soft.      Tenderness: There is no abdominal tenderness.      Hernia: No hernia is present.   Musculoskeletal:      Right hand: Bony tenderness present. No swelling. Normal range of motion.      Left hand: Normal.        Hands:       Cervical back: Normal range of motion and neck supple.      Right lower leg: No edema.      Left lower leg: No edema.   Lymphadenopathy:      Head:      Right side of head: No submental, submandibular, tonsillar, preauricular, posterior auricular or occipital adenopathy.      Left side of head: No submental, submandibular, tonsillar, preauricular, posterior auricular or occipital adenopathy.      Cervical: No cervical adenopathy.   Skin:     General: Skin is warm and dry.      Capillary Refill: Capillary refill takes less than 2 seconds.   Neurological:      General: No focal deficit present.      Mental Status: He is alert and oriented to person, place, and time.      Deep Tendon Reflexes: Reflexes are normal and symmetric.   Psychiatric:          Attention and Perception: Attention normal.         Mood and Affect: Mood normal.         Speech: Speech normal.         Behavior: Behavior normal. Behavior is cooperative.         Thought Content: Thought content normal.         Lab Results   Component Value Date     (H) 08/26/2021    CHLPL 206 (H) 08/26/2021    TRIG 249 (H) 08/26/2021    HDL 47 08/26/2021     (H) 08/26/2021    VLDL 43 (H) 08/26/2021    HGBA1C 8.60 (H) 08/26/2021        Assessment/Plan   Medicare Risks and Personalized Health Plan  CMS Preventative Services Quick Reference  Cardiovascular risk  Colon Cancer Screening  Dementia/Memory   Depression/Dysphoria  Diabetic Lab Screening   Glaucoma Risk  Hearing Problem  Immunizations Discussed/Encouraged (specific immunizations; COVID19 )  Inadequate Social Support, Isolation, Loneliness, Lack of Transportation, Financial Difficulties, or Caregiver Stress   Inactivity/Sedentary  Obesity/Overweight   Osteoporosis Risk  Polypharmacy  Prostate Cancer Screening     The above risks/problems have been discussed with the patient.  Pertinent information has been shared with the patient in the After Visit Summary.  Follow up plans and orders are seen below in the Assessment/Plan Section.    Diagnoses and all orders for this visit:    1. Medicare annual wellness visit, subsequent (Primary)    2. Essential hypertension  -     losartan (COZAAR) 50 MG tablet; Take 1 tablet by mouth Daily.  Dispense: 90 tablet; Refill: 3  -     Semaglutide,0.25 or 0.5MG/DOS, (Ozempic, 0.25 or 0.5 MG/DOSE,) 2 MG/1.5ML solution pen-injector; Inject 0.25 mg under the skin into the appropriate area as directed 1 (One) Time Per Week.  Dispense: 1 pen; Refill: 2    3. Type 2 diabetes mellitus with hyperglycemia, without long-term current use of insulin (CMS/[facility])  -     Semaglutide,0.25 or 0.5MG/DOS, (Ozempic, 0.25 or 0.5 MG/DOSE,) 2 MG/1.5ML solution pen-injector; Inject 0.25 mg under the skin into the appropriate area as  directed 1 (One) Time Per Week.  Dispense: 1 pen; Refill: 2    4. Obesity (BMI 30.0-34.9)  -     Semaglutide,0.25 or 0.5MG/DOS, (Ozempic, 0.25 or 0.5 MG/DOSE,) 2 MG/1.5ML solution pen-injector; Inject 0.25 mg under the skin into the appropriate area as directed 1 (One) Time Per Week.  Dispense: 1 pen; Refill: 2    5. COVID-19 vaccination declined      Follow Up:  No follow-ups on file.     We will start him on Ozempic related to obesity, hypertension and heart disease, and better blood sugar control.  Will do lowest dose possible take and to follow-up in 1 month to determine if he is tolerating therapy well.  We will continue him on Metformin and glipizide as is.  Gave him diet guidelines for diabetics and offered him dietitian.  Will hopefully work on weight loss with diet change and better blood sugar control.  Advised him on the cardiovascular benefits of weight loss.    I will continue him on current hypertension management.  No adjustments to his blood pressure medications today.    Discussed code vaccination and still declining at this time.  An After Visit Summary and PPPS were given to the patient.

## 2021-08-27 NOTE — PATIENT INSTRUCTIONS
Diabetes Mellitus and Nutrition, Adult  When you have diabetes, or diabetes mellitus, it is very important to have healthy eating habits because your blood sugar (glucose) levels are greatly affected by what you eat and drink. Eating healthy foods in the right amounts, at about the same times every day, can help you:  · Control your blood glucose.  · Lower your risk of heart disease.  · Improve your blood pressure.  · Reach or maintain a healthy weight.  What can affect my meal plan?  Every person with diabetes is different, and each person has different needs for a meal plan. Your health care provider may recommend that you work with a dietitian to make a meal plan that is best for you. Your meal plan may vary depending on factors such as:  · The calories you need.  · The medicines you take.  · Your weight.  · Your blood glucose, blood pressure, and cholesterol levels.  · Your activity level.  · Other health conditions you have, such as heart or kidney disease.  How do carbohydrates affect me?  Carbohydrates, also called carbs, affect your blood glucose level more than any other type of food. Eating carbs naturally raises the amount of glucose in your blood. Carb counting is a method for keeping track of how many carbs you eat. Counting carbs is important to keep your blood glucose at a healthy level, especially if you use insulin or take certain oral diabetes medicines.  It is important to know how many carbs you can safely have in each meal. This is different for every person. Your dietitian can help you calculate how many carbs you should have at each meal and for each snack.  How does alcohol affect me?  Alcohol can cause a sudden decrease in blood glucose (hypoglycemia), especially if you use insulin or take certain oral diabetes medicines. Hypoglycemia can be a life-threatening condition. Symptoms of hypoglycemia, such as sleepiness, dizziness, and confusion, are similar to symptoms of having too much  "alcohol.  · Do not drink alcohol if:  ? Your health care provider tells you not to drink.  ? You are pregnant, may be pregnant, or are planning to become pregnant.  · If you drink alcohol:  ? Do not drink on an empty stomach.  ? Limit how much you use to:  § 0-1 drink a day for women.  § 0-2 drinks a day for men.  ? Be aware of how much alcohol is in your drink. In the [place] one drink equals one 12 oz bottle of beer (355 mL), one 5 oz glass of wine (148 mL), or one 1½ oz glass of hard liquor (44 mL).  ? Keep yourself hydrated with water, diet soda, or unsweetened iced tea.  § Keep in mind that regular soda, juice, and other mixers may contain a lot of sugar and must be counted as carbs.  What are tips for following this plan?    Reading food labels  · Start by checking the serving size on the \"Nutrition Facts\" label of packaged foods and drinks. The amount of calories, carbs, fats, and other nutrients listed on the label is based on one serving of the item. Many items contain more than one serving per package.  · Check the total grams (g) of carbs in one serving. You can calculate the number of servings of carbs in one serving by dividing the total carbs by 15. For example, if a food has 30 g of total carbs per serving, it would be equal to 2 servings of carbs.  · Check the number of grams (g) of saturated fats and trans fats in one serving. Choose foods that have a low amount or none of these fats.  · Check the number of milligrams (mg) of salt (sodium) in one serving. Most people should limit total sodium intake to less than 2,300 mg per day.  · Always check the nutrition information of foods labeled as \"low-fat\" or \"nonfat.\" These foods may be higher in added sugar or refined carbs and should be avoided.  · Talk to your dietitian to identify your daily goals for nutrients listed on the label.  Shopping  · Avoid buying canned, pre-made, or processed foods. These foods tend to be high in fat, sodium, and added " sugar.  · Shop around the outside edge of the grocery store. This is where you will most often find fresh fruits and vegetables, bulk grains, fresh meats, and fresh dairy.  Cooking  · Use low-heat cooking methods, such as baking, instead of high-heat cooking methods like deep frying.  · Cook using healthy oils, such as olive, canola, or sunflower oil.  · Avoid cooking with butter, cream, or high-fat meats.  Meal planning  · Eat meals and snacks regularly, preferably at the same times every day. Avoid going long periods of time without eating.  · Eat foods that are high in fiber, such as fresh fruits, vegetables, beans, and whole grains. Talk with your dietitian about how many servings of carbs you can eat at each meal.  · Eat 4-6 oz (112-168 g) of lean protein each day, such as lean meat, chicken, fish, eggs, or tofu. One ounce (oz) of lean protein is equal to:  ? 1 oz (28 g) of meat, chicken, or fish.  ? 1 egg.  ? ¼ cup (62 g) of tofu.  · Eat some foods each day that contain healthy fats, such as avocado, nuts, seeds, and fish.  What foods should I eat?  Fruits  Berries. Apples. Oranges. Peaches. Apricots. Plums. Grapes. [place]. Papaya. Pomegranate. Kiwi. Cherries.  Vegetables  Lettuce. Spinach. Leafy greens, including kale, chard, emmanuelle greens, and mustard greens. Beets. Cauliflower. Cabbage. Broccoli. Carrots. Green beans. Tomatoes. Peppers. Onions. Cucumbers. [place] sprouts.  Grains  Whole grains, such as whole-wheat or whole-grain bread, crackers, tortillas, cereal, and pasta. Unsweetened oatmeal. Quinoa. Brown or wild rice.  Meats and other proteins  Seafood. Poultry without skin. Lean cuts of poultry and beef. Tofu. Nuts. Seeds.  Dairy  Low-fat or fat-free dairy products such as milk, yogurt, and cheese.  The items listed above may not be a complete list of foods and beverages you can eat. Contact a dietitian for more information.  What foods should I avoid?  Fruits  Fruits canned with  syrup.  Vegetables  Canned vegetables. Frozen vegetables with butter or cream sauce.  Grains  Refined white flour and flour products such as bread, pasta, snack foods, and cereals. Avoid all processed foods.  Meats and other proteins  Fatty cuts of meat. Poultry with skin. Breaded or fried meats. Processed meat. Avoid saturated fats.  Dairy  Full-fat yogurt, cheese, or milk.  Beverages  Sweetened drinks, such as soda or iced tea.  The items listed above may not be a complete list of foods and beverages you should avoid. Contact a dietitian for more information.  Questions to ask a health care provider  · Do I need to meet with a diabetes educator?  · Do I need to meet with a dietitian?  · What number can I call if I have questions?  · When are the best times to check my blood glucose?  Where to find more information:  · American Diabetes Association: diabetes.org  · Academy of Nutrition and Dietetics: www.eatright.org  · National [place] of Diabetes and Digestive and Kidney Diseases: www.niddk.nih.gov  · Association of Diabetes Care and Education Specialists: www.diabeteseducator.org  Summary  · It is important to have healthy eating habits because your blood sugar (glucose) levels are greatly affected by what you eat and drink.  · A healthy meal plan will help you control your blood glucose and maintain a healthy lifestyle.  · Your health care provider may recommend that you work with a dietitian to make a meal plan that is best for you.  · Keep in mind that carbohydrates (carbs) and alcohol have immediate effects on your blood glucose levels. It is important to count carbs and to use alcohol carefully.  This information is not intended to replace advice given to you by your health care provider. Make sure you discuss any questions you have with your health care provider.  Document Revised: 11/24/2020 Document Reviewed: 11/24/2020  Elsevier Patient Education © 2021 [employer] Inc.

## 2021-09-01 ENCOUNTER — CLINICAL SUPPORT (OUTPATIENT)
Dept: INTERNAL MEDICINE | Facility: CLINIC | Age: 76
End: 2021-09-01

## 2021-09-01 DIAGNOSIS — D51.0 PERNICIOUS ANEMIA: Primary | ICD-10-CM

## 2021-09-01 PROCEDURE — 96372 THER/PROPH/DIAG INJ SC/IM: CPT | Performed by: INTERNAL MEDICINE

## 2021-09-01 RX ADMIN — CYANOCOBALAMIN 1000 MCG: 1000 INJECTION, SOLUTION INTRAMUSCULAR; SUBCUTANEOUS at 08:53

## 2021-09-03 ENCOUNTER — TELEPHONE (OUTPATIENT)
Dept: INTERNAL MEDICINE | Facility: CLINIC | Age: 76
End: 2021-09-03

## 2021-09-03 NOTE — TELEPHONE ENCOUNTER
----- Message from LISSET Kuo sent at 9/2/2021  8:01 PM CDT -----  Positive COVID antibody test suggesting recent or past infection.

## 2021-09-15 ENCOUNTER — CLINICAL SUPPORT (OUTPATIENT)
Dept: INTERNAL MEDICINE | Facility: CLINIC | Age: 76
End: 2021-09-15

## 2021-09-15 DIAGNOSIS — D51.0 PERNICIOUS ANEMIA: ICD-10-CM

## 2021-09-15 PROCEDURE — 96372 THER/PROPH/DIAG INJ SC/IM: CPT | Performed by: NURSE PRACTITIONER

## 2021-09-15 RX ADMIN — CYANOCOBALAMIN 1000 MCG: 1000 INJECTION, SOLUTION INTRAMUSCULAR; SUBCUTANEOUS at 08:53

## 2021-09-30 ENCOUNTER — OFFICE VISIT (OUTPATIENT)
Dept: CARDIOLOGY CLINIC | Age: 76
End: 2021-09-30
Payer: MEDICARE

## 2021-09-30 ENCOUNTER — CLINICAL SUPPORT (OUTPATIENT)
Dept: INTERNAL MEDICINE | Facility: CLINIC | Age: 76
End: 2021-09-30

## 2021-09-30 VITALS
SYSTOLIC BLOOD PRESSURE: 130 MMHG | BODY MASS INDEX: 31.66 KG/M2 | WEIGHT: 197 LBS | HEIGHT: 66 IN | HEART RATE: 84 BPM | DIASTOLIC BLOOD PRESSURE: 72 MMHG

## 2021-09-30 DIAGNOSIS — Z23 NEED FOR INFLUENZA VACCINATION: Primary | ICD-10-CM

## 2021-09-30 DIAGNOSIS — E78.2 MIXED HYPERLIPIDEMIA: ICD-10-CM

## 2021-09-30 DIAGNOSIS — I25.10 CORONARY ARTERY DISEASE INVOLVING NATIVE CORONARY ARTERY OF NATIVE HEART WITHOUT ANGINA PECTORIS: Primary | ICD-10-CM

## 2021-09-30 DIAGNOSIS — Z95.1 STATUS POST AORTO-CORONARY ARTERY BYPASS GRAFT: ICD-10-CM

## 2021-09-30 PROCEDURE — 90662 IIV NO PRSV INCREASED AG IM: CPT | Performed by: NURSE PRACTITIONER

## 2021-09-30 PROCEDURE — 99213 OFFICE O/P EST LOW 20 MIN: CPT | Performed by: INTERNAL MEDICINE

## 2021-09-30 PROCEDURE — G0008 ADMIN INFLUENZA VIRUS VAC: HCPCS | Performed by: NURSE PRACTITIONER

## 2021-09-30 RX ORDER — ZINC GLUCONATE 50 MG
50 TABLET ORAL DAILY
COMMUNITY

## 2021-09-30 RX ORDER — ERGOCALCIFEROL 1.25 MG/1
50000 CAPSULE ORAL WEEKLY
COMMUNITY
End: 2022-10-13

## 2021-09-30 RX ADMIN — CYANOCOBALAMIN 1000 MCG: 1000 INJECTION, SOLUTION INTRAMUSCULAR; SUBCUTANEOUS at 12:00

## 2021-09-30 ASSESSMENT — ENCOUNTER SYMPTOMS
GASTROINTESTINAL NEGATIVE: 1
NAUSEA: 0
RESPIRATORY NEGATIVE: 1
SHORTNESS OF BREATH: 0
DIARRHEA: 0
EYES NEGATIVE: 1
VOMITING: 0

## 2021-09-30 NOTE — PROGRESS NOTES
[facility] Progress [facility]                            Date:  9/30/2021  Patient: Betina Orellana. Age:  67 y.o., 1945      Reason for evaluation:         SUBJECTIVE:    Returns today follow-up assessment for coronary artery disease previous CABG hyperlipidemia. Overall feeling well. Remains moderately active. Blood pressure 130/72 heart 84. Recent LDL cholesterol 103. Denies claudication denies chest pain denies palpitations has not had to take any sublingual nitroglycerin recently denies significant dyspnea. Review of Systems   Constitutional: Negative. Negative for chills, fever and unexpected weight change. HENT: Negative. Eyes: Negative. Respiratory: Negative. Negative for shortness of breath. Cardiovascular: Negative. Negative for chest pain. Gastrointestinal: Negative. Negative for diarrhea, nausea and vomiting. Endocrine: Negative. Genitourinary: Negative. Musculoskeletal: Negative. Skin: Negative. Neurological: Negative. All other systems reviewed and are negative. OBJECTIVE:     /72   Pulse 84   Ht 5' 6\" (1.676 m)   Wt 197 lb (89.4 kg)   BMI 31.80 kg/m²     Labs:   CBC: No results for input(s): WBC, HGB, HCT, PLT in the last 72 hours. BMP:No results for input(s): NA, K, CO2, BUN, CREATININE, LABGLOM, GLUCOSE in the last 72 hours. BNP: No results for input(s): BNP in the last 72 hours. PT/INR: No results for input(s): PROTIME, INR in the last 72 hours. APTT:No results for input(s): APTT in the last 72 hours. CARDIAC ENZYMES:No results for input(s): CKTOTAL, CKMB, CKMBINDEX, TROPONINI in the last 72 hours. FASTING LIPID PANEL:  Lab Results   Component Value Date    HDL 41 09/23/2020    LDLCALC 62 09/23/2020    TRIG 148 09/23/2020     LIVER PROFILE:No results for input(s): AST, ALT, LABALBU in the last 72 hours.         Past Medical History:   Diagnosis Date    CAD (coronary artery disease)     Chronic back pain Vitamins-Minerals (MULTIVITAMIN PO) Take  by mouth daily.  metFORMIN (GLUCOPHAGE) 500 MG tablet Take 1,000 mg by mouth 2 times daily (with meals)        No current facility-administered medications for this visit. Social History     Socioeconomic History    Marital status:      Spouse name: Not on file    Number of children: Not on file    Years of education: Not on file    Highest education level: Not on file   Occupational History    Not on file   Tobacco Use    Smoking status: Former Smoker     Packs/day: 0.50     Years: 10.00     Pack years: 5.00     Types: Cigarettes     Quit date: 65     Years since quittin.7    Smokeless tobacco: Never Used   Vaping Use    Vaping Use: Never assessed   Substance and Sexual Activity    Alcohol use: No    Drug use: No    Sexual activity: Not on file   Other Topics Concern    Not on file   Social History Narrative    Not on file     Social Determinants of Health     Financial Resource Strain:     Difficulty of Paying Living Expenses:    Food Insecurity:     Worried About [address] in the Last Year:     [address] [employer] in the Last Year:    Transportation Needs:     Lack of Transportation (Medical):  Lack of Transportation (Non-Medical):    Physical Activity:     Days of Exercise per Week:     Minutes of Exercise per Session:    Stress:     Feeling of Stress :    Social Connections:     Frequency of Communication with Friends and Family:     Frequency of Social Gatherings with Friends and Family:     Attends Bahai Services:     Active Member of Clubs or Organizations:     Attends Club or Organization Meetings:     Marital Status:    Intimate Partner Violence:     Fear of Current or Ex-Partner:     Emotionally Abused:     Physically Abused:     Sexually Abused:        Physical Examination:  /72   Pulse 84   Ht 5' 6\" (1.676 m)   Wt 197 lb (89.4 kg)   BMI 31.80 kg/m²   Physical Exam  Vitals reviewed. Constitutional:       Appearance: He is well-developed. Neck:      Vascular: No carotid bruit or JVD. Cardiovascular:      Rate and Rhythm: Normal rate and regular rhythm. Heart sounds: Normal heart sounds. No murmur heard. No friction rub. No gallop. Pulmonary:      Effort: Pulmonary effort is normal. No respiratory distress. Breath sounds: Normal breath sounds. No wheezing or rales. Abdominal:      General: There is no distension. Tenderness: There is no abdominal tenderness. Lymphadenopathy:      Cervical: No cervical adenopathy. Skin:     General: Skin is warm and dry. ASSESSMENT:     Diagnosis Orders   1. Coronary artery disease involving native coronary artery of native heart without angina pectoris     2. Status post aorto-coronary artery bypass graft     3. Mixed hyperlipidemia         PLAN:  No orders of the defined types were placed in this encounter. No orders of the defined types were placed in this encounter. 1. Continue present medications  2. Recommend follow-up assessment in 6 months    Return in about 6 months (around 3/30/2022) for return to Dr. Michaeline Kawasaki only. Michelle Meyer MD 9/30/2021 11:25 AM CDT    [facility]      Thisdictation was generated by voice recognition computer software. Although all attempts are made to edit the dictation for accuracy, there may be errors in the transcription that are not intended.

## 2021-10-12 ENCOUNTER — OFFICE VISIT (OUTPATIENT)
Dept: INTERNAL MEDICINE | Facility: CLINIC | Age: 76
End: 2021-10-12

## 2021-10-12 VITALS
OXYGEN SATURATION: 97 % | WEIGHT: 195 LBS | SYSTOLIC BLOOD PRESSURE: 124 MMHG | DIASTOLIC BLOOD PRESSURE: 70 MMHG | TEMPERATURE: 97.5 F | BODY MASS INDEX: 31.34 KG/M2 | HEIGHT: 66 IN | HEART RATE: 91 BPM

## 2021-10-12 DIAGNOSIS — Z91.89 POTENTIAL FOR DEFICIENT KNOWLEDGE OF DIABETES MELLITUS: ICD-10-CM

## 2021-10-12 DIAGNOSIS — I10 ESSENTIAL HYPERTENSION: ICD-10-CM

## 2021-10-12 DIAGNOSIS — E11.65 TYPE 2 DIABETES MELLITUS WITH HYPERGLYCEMIA, WITHOUT LONG-TERM CURRENT USE OF INSULIN (HCC): Primary | ICD-10-CM

## 2021-10-12 PROCEDURE — 99213 OFFICE O/P EST LOW 20 MIN: CPT | Performed by: NURSE PRACTITIONER

## 2021-10-12 PROCEDURE — 96372 THER/PROPH/DIAG INJ SC/IM: CPT | Performed by: NURSE PRACTITIONER

## 2021-10-12 RX ORDER — BLOOD SUGAR DIAGNOSTIC
1 STRIP MISCELLANEOUS 3 TIMES DAILY
Qty: 100 EACH | Refills: 5 | Status: SHIPPED | OUTPATIENT
Start: 2021-10-12 | End: 2021-12-09

## 2021-10-12 RX ORDER — ZINC GLUCONATE 50 MG
50 TABLET ORAL DAILY
COMMUNITY

## 2021-10-12 RX ORDER — BLOOD-GLUCOSE METER
1 KIT MISCELLANEOUS AS NEEDED
Qty: 1 EACH | Refills: 0 | Status: SHIPPED | OUTPATIENT
Start: 2021-10-12 | End: 2021-10-18

## 2021-10-12 RX ORDER — GLYBURIDE 5 MG/1
TABLET ORAL
Qty: 180 TABLET | Refills: 3
Start: 2021-10-12 | End: 2022-04-05

## 2021-10-12 RX ADMIN — CYANOCOBALAMIN 1000 MCG: 1000 INJECTION, SOLUTION INTRAMUSCULAR; SUBCUTANEOUS at 10:39

## 2021-10-12 NOTE — PROGRESS NOTES
Subjective   Michael Novak is a 76 y.o. male.   Chief Complaint   Patient presents with   • Diabetes       Ms. Novak present for diabetic and hypertension follow up. At last visit patient's A1C had increased and patient admitted to poor diet and is not regularly monitoring his blood sugars. He reports taking his medicines as prescribed. He denies skipping or missing doses. Diet education multiple session with the patient and still eating carbohydrates inappropriately. He does report regular exercise at work which is very labor intensive. He continued to deny nutritional assistance.     Patient's blood pressure in the office today controlled. He denies checking blood pressures at home. He did not bring blood pressure logs for my review. He denies shortness of breath, exercise intolerance, palpitation, or low extremity edema. He reports taking his blood pressure medicine daily and denies skipping or missing doses. He denies side effects with current medical therapy.     Diabetes  He presents for his follow-up diabetic visit. He has type 2 diabetes mellitus. His disease course has been worsening. Hypoglycemia symptoms include dizziness and sleepiness. Pertinent negatives for hypoglycemia include no confusion or nervousness/anxiousness. Pertinent negatives for diabetes include no blurred vision, no chest pain, no fatigue, no polydipsia, no polyphagia, no weakness and no weight loss. There are no hypoglycemic complications. Diabetic complications include heart disease and impotence. Risk factors for coronary artery disease include male sex, dyslipidemia, obesity and hypertension. Current diabetic treatment includes oral agent (triple therapy) and diet. He is compliant with treatment most of the time. His weight is fluctuating minimally. He is following a generally healthy diet. Meal planning includes avoidance of concentrated sweets. He participates in exercise intermittently. His home blood glucose trend  is fluctuating dramatically. His breakfast blood glucose is taken between 8-9 am. His breakfast blood glucose range is generally 70-90 mg/dl. His lunch blood glucose is taken between 12-1 pm. His lunch blood glucose range is generally >200 mg/dl. His dinner blood glucose is taken between 5-6 pm. His dinner blood glucose range is generally 140-180 mg/dl. An ACE inhibitor/angiotensin II receptor blocker is being taken. He does not see a podiatrist.Eye exam is current.   Hypertension  This is a chronic problem. The current episode started more than 1 year ago. The problem has been gradually improving since onset. The problem is controlled. Pertinent negatives include no blurred vision, chest pain, neck pain, palpitations, peripheral edema or shortness of breath. Risk factors for coronary artery disease include dyslipidemia, diabetes mellitus, family history, male gender and obesity. Past treatments include lifestyle changes. Current antihypertension treatment includes angiotensin blockers and lifestyle changes. The current treatment provides moderate improvement. Compliance problems include diet and exercise.         The following portions of the patient's history were reviewed and updated as appropriate: allergies, current medications, past family history, past medical history, past social history, past surgical history and problem list.    Review of Systems   Constitutional: Negative for activity change, appetite change, fatigue, fever, unexpected weight gain and unexpected weight loss.   HENT: Negative for swollen glands, trouble swallowing and voice change.    Eyes: Negative for blurred vision and visual disturbance.   Respiratory: Negative for cough and shortness of breath.    Cardiovascular: Negative for chest pain, palpitations and leg swelling.   Gastrointestinal: Negative for abdominal pain, constipation, diarrhea, nausea, vomiting and indigestion.   Endocrine: Negative for cold intolerance, heat intolerance,  polydipsia and polyphagia.   Genitourinary: Positive for impotence. Negative for dysuria and frequency.   Musculoskeletal: Negative for arthralgias, back pain, joint swelling and neck pain.   Skin: Negative for color change, rash and skin lesions.   Neurological: Positive for dizziness. Negative for weakness, headache, memory problem and confusion.   Hematological: Does not bruise/bleed easily.   Psychiatric/Behavioral: Negative for agitation, hallucinations and suicidal ideas. The patient is not nervous/anxious.        Objective   Past Medical History:   Diagnosis Date   • Back pain    • Coronary artery disease    • Diabetes mellitus (HCC)    • Hearing deficit    • Hypertension    • Prostate cancer (HCC)       Past Surgical History:   Procedure Laterality Date   • CORONARY ARTERY BYPASS GRAFT     • ROTATOR CUFF REPAIR Left 03/15/2017        Current Outpatient Medications:   •  Accu-Chek Softclix Lancets lancets, 1 each by Other route 2 (two) times a day. Use as instructed, Disp: 200 each, Rfl: 3  •  aspirin 325 MG tablet, Take 325 mg by mouth Daily., Disp: , Rfl:   •  atorvastatin (LIPITOR) 40 MG tablet, Take 1 tablet by mouth once daily, Disp: 90 tablet, Rfl: 3  •  glucose blood (OneTouch Verio) test strip, 1 each by Other route 3 (Three) Times a Day. Use as instructed, Disp: 100 each, Rfl: 5  •  glucose monitor monitoring kit, 1 each 2 (Two) Times a Day., Disp: 1 each, Rfl: 0  •  glyburide (DIAbeta) 5 MG tablet, TAKE 1 TABLETS BY MOUTH BEFORE BREAKFAST, LUNCH and SUPPER if BS >150, Disp: 180 tablet, Rfl: 3  •  losartan (COZAAR) 50 MG tablet, Take 1 tablet by mouth Daily., Disp: 90 tablet, Rfl: 3  •  metFORMIN (GLUCOPHAGE) 1000 MG tablet, Take 1 tablet by mouth 2 (Two) Times a Day With Meals., Disp: 180 tablet, Rfl: 3  •  Multiple Vitamins-Minerals (MULTIVITAMIN MEN) tablet, Take 1 tablet by mouth Daily., Disp: , Rfl:   •  omeprazole (priLOSEC) 20 MG capsule, Take 1 capsule by mouth Daily., Disp: 90 capsule, Rfl:  3  •  Semaglutide,0.25 or 0.5MG/DOS, (Ozempic, 0.25 or 0.5 MG/DOSE,) 2 MG/1.5ML solution pen-injector, Inject 0.25 mg under the skin into the appropriate area as directed 1 (One) Time Per Week., Disp: 1 pen, Rfl: 2  •  sildenafil (VIAGRA) 100 MG tablet, Take 1 tablet by mouth Daily As Needed for Erectile Dysfunction., Disp: 30 tablet, Rfl: 2  •  vitamin D (ERGOCALCIFEROL) 1.25 MG (09286 UT) capsule capsule, Take 1 capsule by mouth once a week, Disp: 5 capsule, Rfl: 11  •  glucose monitor monitoring kit, 1 each As Needed (TID before meals)., Disp: 1 each, Rfl: 0  •  zinc gluconate 50 MG tablet, Take 50 mg by mouth Daily., Disp: , Rfl:     Current Facility-Administered Medications:   •  cyanocobalamin injection 1,000 mcg, 1,000 mcg, Intramuscular, Q28 Days, Paramjit Shi MD, 1,000 mcg at 10/12/21 1039      Vitals:    10/12/21 1032   BP: 124/70   Pulse: 91   Temp: 97.5 °F (36.4 °C)   SpO2: 97%         10/12/21  1032   Weight: 88.5 kg (195 lb)       Body mass index is 31.47 kg/m².    Physical Exam  Vitals and nursing note reviewed.   Constitutional:       Appearance: Normal appearance. He is well-developed. He is obese.   HENT:      Head: Normocephalic and atraumatic.      Right Ear: Hearing and external ear normal.      Left Ear: Hearing and external ear normal.      Nose: Nose normal.   Eyes:      General: Lids are normal. Lids are everted, no foreign bodies appreciated. Vision grossly intact.      Extraocular Movements: Extraocular movements intact.      Conjunctiva/sclera: Conjunctivae normal.      Pupils: Pupils are equal, round, and reactive to light.   Neck:      Thyroid: No thyromegaly.      Vascular: No carotid bruit.   Cardiovascular:      Rate and Rhythm: Normal rate and regular rhythm.      Pulses: Normal pulses.      Heart sounds: Normal heart sounds.   Pulmonary:      Effort: Pulmonary effort is normal.      Breath sounds: Normal breath sounds.   Abdominal:      General: Abdomen is protuberant. Bowel  sounds are normal.      Palpations: Abdomen is soft.      Tenderness: There is no abdominal tenderness.   Musculoskeletal:      Cervical back: Normal range of motion and neck supple.      Right lower leg: No edema.      Left lower leg: No edema.   Lymphadenopathy:      Cervical: No cervical adenopathy.   Skin:     General: Skin is warm and dry.      Capillary Refill: Capillary refill takes less than 2 seconds.   Neurological:      General: No focal deficit present.      Mental Status: He is alert and oriented to person, place, and time.      Deep Tendon Reflexes: Reflexes are normal and symmetric.   Psychiatric:         Attention and Perception: Attention normal.         Mood and Affect: Mood normal.         Speech: Speech normal.         Behavior: Behavior normal. Behavior is cooperative.         Thought Content: Thought content normal.         Cognition and Memory: Cognition normal.               Assessment/Plan   Diagnoses and all orders for this visit:    1. Type 2 diabetes mellitus with hyperglycemia, without long-term current use of insulin ([facility]) (Primary)  -     glucose monitor monitoring kit; 1 each As Needed (TID before meals).  Dispense: 1 each; Refill: 0  -     glucose blood (OneTouch Verio) test strip; 1 each by Other route 3 (Three) Times a Day. Use as instructed  Dispense: 100 each; Refill: 5  -     glyburide (DIAbeta) 5 MG tablet; TAKE 1 TABLETS BY MOUTH BEFORE BREAKFAST, LUNCH and SUPPER if BS >150  Dispense: 180 tablet; Refill: 3    2. Essential hypertension    3. Potential for deficient knowledge of diabetes mellitus      Patient will only take glyburide when blood sugars are >150 mg/dl. Patient will check blood sugar before each meal. Patient will increase ozempic to 0.5 this Sunday. Will have him return in 3 months for diabetic follow up. We discussed ADA diet and will continue to work on avoiding carbohydrates and increase vegetable fibers and protein.     Continues to decline nutritional  consultation with dietician or nutritionist.

## 2021-10-18 DIAGNOSIS — E11.65 TYPE 2 DIABETES MELLITUS WITH HYPERGLYCEMIA, WITHOUT LONG-TERM CURRENT USE OF INSULIN (HCC): ICD-10-CM

## 2021-10-18 RX ORDER — BLOOD-GLUCOSE METER
EACH MISCELLANEOUS
Qty: 1 KIT | Refills: 0 | Status: SHIPPED | OUTPATIENT
Start: 2021-10-18 | End: 2023-01-06 | Stop reason: SDUPTHER

## 2021-11-08 ENCOUNTER — CLINICAL SUPPORT (OUTPATIENT)
Dept: INTERNAL MEDICINE | Facility: CLINIC | Age: 76
End: 2021-11-08

## 2021-11-08 DIAGNOSIS — D51.0 PERNICIOUS ANEMIA: ICD-10-CM

## 2021-11-08 PROCEDURE — 96372 THER/PROPH/DIAG INJ SC/IM: CPT | Performed by: NURSE PRACTITIONER

## 2021-11-08 RX ADMIN — CYANOCOBALAMIN 1000 MCG: 1000 INJECTION, SOLUTION INTRAMUSCULAR; SUBCUTANEOUS at 08:10

## 2021-11-18 ENCOUNTER — CLINICAL SUPPORT (OUTPATIENT)
Dept: INTERNAL MEDICINE | Facility: CLINIC | Age: 76
End: 2021-11-18

## 2021-11-18 DIAGNOSIS — D51.0 PERNICIOUS ANEMIA: Primary | ICD-10-CM

## 2021-11-18 PROCEDURE — 96372 THER/PROPH/DIAG INJ SC/IM: CPT | Performed by: INTERNAL MEDICINE

## 2021-11-18 RX ADMIN — CYANOCOBALAMIN 1000 MCG: 1000 INJECTION, SOLUTION INTRAMUSCULAR; SUBCUTANEOUS at 11:59

## 2021-11-29 ENCOUNTER — CLINICAL SUPPORT (OUTPATIENT)
Dept: INTERNAL MEDICINE | Facility: CLINIC | Age: 76
End: 2021-11-29

## 2021-11-29 DIAGNOSIS — D51.0 PERNICIOUS ANEMIA: ICD-10-CM

## 2021-11-29 PROCEDURE — 96372 THER/PROPH/DIAG INJ SC/IM: CPT | Performed by: INTERNAL MEDICINE

## 2021-11-29 RX ADMIN — CYANOCOBALAMIN 1000 MCG: 1000 INJECTION, SOLUTION INTRAMUSCULAR; SUBCUTANEOUS at 08:41

## 2021-12-09 DIAGNOSIS — E11.65 TYPE 2 DIABETES MELLITUS WITH HYPERGLYCEMIA, WITHOUT LONG-TERM CURRENT USE OF INSULIN (HCC): ICD-10-CM

## 2021-12-09 RX ORDER — BLOOD SUGAR DIAGNOSTIC
STRIP MISCELLANEOUS
Qty: 100 EACH | Refills: 12 | Status: SHIPPED | OUTPATIENT
Start: 2021-12-09 | End: 2022-07-25 | Stop reason: SDUPTHER

## 2021-12-15 ENCOUNTER — CLINICAL SUPPORT (OUTPATIENT)
Dept: INTERNAL MEDICINE | Facility: CLINIC | Age: 76
End: 2021-12-15

## 2021-12-15 DIAGNOSIS — D51.0 PERNICIOUS ANEMIA: ICD-10-CM

## 2021-12-15 PROCEDURE — 96372 THER/PROPH/DIAG INJ SC/IM: CPT | Performed by: INTERNAL MEDICINE

## 2021-12-15 RX ADMIN — CYANOCOBALAMIN 1000 MCG: 1000 INJECTION, SOLUTION INTRAMUSCULAR; SUBCUTANEOUS at 11:31

## 2021-12-19 DIAGNOSIS — I10 ESSENTIAL HYPERTENSION: ICD-10-CM

## 2021-12-19 DIAGNOSIS — E66.9 OBESITY (BMI 30.0-34.9): ICD-10-CM

## 2021-12-19 DIAGNOSIS — E11.65 TYPE 2 DIABETES MELLITUS WITH HYPERGLYCEMIA, WITHOUT LONG-TERM CURRENT USE OF INSULIN (HCC): ICD-10-CM

## 2021-12-20 RX ORDER — SEMAGLUTIDE 1.34 MG/ML
INJECTION, SOLUTION SUBCUTANEOUS
Qty: 2 ML | Refills: 2 | Status: SHIPPED | OUTPATIENT
Start: 2021-12-20 | End: 2022-01-14 | Stop reason: SDUPTHER

## 2022-01-05 ENCOUNTER — CLINICAL SUPPORT (OUTPATIENT)
Dept: INTERNAL MEDICINE | Facility: CLINIC | Age: 77
End: 2022-01-05

## 2022-01-05 DIAGNOSIS — D51.0 PERNICIOUS ANEMIA: Primary | ICD-10-CM

## 2022-01-05 PROCEDURE — 96372 THER/PROPH/DIAG INJ SC/IM: CPT | Performed by: NURSE PRACTITIONER

## 2022-01-05 RX ADMIN — CYANOCOBALAMIN 1000 MCG: 1000 INJECTION, SOLUTION INTRAMUSCULAR; SUBCUTANEOUS at 15:12

## 2022-01-14 ENCOUNTER — OFFICE VISIT (OUTPATIENT)
Dept: INTERNAL MEDICINE | Facility: CLINIC | Age: 77
End: 2022-01-14

## 2022-01-14 VITALS
HEART RATE: 71 BPM | SYSTOLIC BLOOD PRESSURE: 140 MMHG | BODY MASS INDEX: 31.02 KG/M2 | OXYGEN SATURATION: 99 % | DIASTOLIC BLOOD PRESSURE: 76 MMHG | TEMPERATURE: 96.9 F | WEIGHT: 193 LBS | HEIGHT: 66 IN

## 2022-01-14 DIAGNOSIS — E66.9 OBESITY (BMI 30.0-34.9): ICD-10-CM

## 2022-01-14 DIAGNOSIS — E11.65 TYPE 2 DIABETES MELLITUS WITH HYPERGLYCEMIA, WITHOUT LONG-TERM CURRENT USE OF INSULIN: ICD-10-CM

## 2022-01-14 DIAGNOSIS — I10 ESSENTIAL HYPERTENSION: ICD-10-CM

## 2022-01-14 LAB — HBA1C MFR BLD: 6.5 %

## 2022-01-14 PROCEDURE — 99213 OFFICE O/P EST LOW 20 MIN: CPT | Performed by: NURSE PRACTITIONER

## 2022-01-14 PROCEDURE — 3044F HG A1C LEVEL LT 7.0%: CPT | Performed by: NURSE PRACTITIONER

## 2022-01-14 PROCEDURE — 83036 HEMOGLOBIN GLYCOSYLATED A1C: CPT | Performed by: NURSE PRACTITIONER

## 2022-01-14 RX ORDER — SEMAGLUTIDE 1.34 MG/ML
0.5 INJECTION, SOLUTION SUBCUTANEOUS WEEKLY
Qty: 2 ML | Refills: 2 | Status: SHIPPED | OUTPATIENT
Start: 2022-01-14 | End: 2022-06-10

## 2022-01-14 NOTE — PROGRESS NOTES
Subjective   Michael Novak is a 76 y.o. male.   Chief Complaint   Patient presents with   • Diabetes     a1c: 6.5       Mr. Novak present to the office today for diabetes and hypertension follow up. He has been working on his diet and reporting eating more vegetables and protein and limiting carbohydrates significantly. Patient was started on Ozempic 3 months earlier and blood sugars are consistently < 180. He reports blood sugars in the 's and -180, but is taking a glipizide with meal if > 150 as discussed.         Diabetes  He presents for his follow-up diabetic visit. He has type 2 diabetes mellitus. His disease course has been improving. There are no hypoglycemic associated symptoms. Pertinent negatives for hypoglycemia include no confusion, dizziness, headaches or nervousness/anxiousness. Pertinent negatives for diabetes include no blurred vision, no chest pain, no fatigue, no foot paresthesias, no polydipsia, no polyphagia, no weakness and no weight loss. There are no hypoglycemic complications. Symptoms are stable. Diabetic complications include heart disease. Risk factors for coronary artery disease include male sex, obesity, stress, diabetes mellitus and hypertension. Current diabetic treatment includes insulin injections and oral agent (dual therapy). He is compliant with treatment most of the time. He is following a generally healthy diet. Meal planning includes avoidance of concentrated sweets and carbohydrate counting. He participates in exercise three times a week. His home blood glucose trend is decreasing steadily. His breakfast blood glucose is taken between 7-8 am. His breakfast blood glucose range is generally  mg/dl. His dinner blood glucose is taken between 6-7 pm. His dinner blood glucose range is generally 140-180 mg/dl. An ACE inhibitor/angiotensin II receptor blocker is being taken. He does not see a podiatrist.Eye exam is current.   Hypertension  This is a  chronic problem. The current episode started more than 1 year ago. The problem has been waxing and waning since onset. Pertinent negatives include no anxiety, blurred vision, chest pain, headaches, malaise/fatigue, neck pain, orthopnea, palpitations, peripheral edema or shortness of breath. There are no associated agents to hypertension. Risk factors for coronary artery disease include male gender, obesity and diabetes mellitus. Past treatments include angiotensin blockers and lifestyle changes. Current antihypertension treatment includes lifestyle changes and angiotensin blockers. The current treatment provides mild improvement. There are no compliance problems.         The following portions of the patient's history were reviewed and updated as appropriate: allergies, current medications, past family history, past medical history, past social history, past surgical history and problem list.    Review of Systems   Constitutional: Negative for activity change, appetite change, fatigue, fever, malaise/fatigue, unexpected weight gain and unexpected weight loss.   HENT: Negative for swollen glands, trouble swallowing and voice change.    Eyes: Negative for blurred vision and visual disturbance.   Respiratory: Negative for cough and shortness of breath.    Cardiovascular: Negative for chest pain, palpitations, orthopnea and leg swelling.   Gastrointestinal: Negative for abdominal pain, constipation, diarrhea, nausea, vomiting and indigestion.   Endocrine: Negative for cold intolerance, heat intolerance, polydipsia and polyphagia.   Genitourinary: Negative for dysuria and frequency.   Musculoskeletal: Negative for arthralgias, back pain, joint swelling and neck pain.   Skin: Negative for color change, rash and skin lesions.   Neurological: Negative for dizziness, weakness, headache, memory problem and confusion.   Hematological: Does not bruise/bleed easily.   Psychiatric/Behavioral: Negative for agitation,  hallucinations and suicidal ideas. The patient is not nervous/anxious.        Objective   Past Medical History:   Diagnosis Date   • Back pain    • Coronary artery disease    • Diabetes mellitus (HCC)    • Hearing deficit    • Hypertension    • Prostate cancer (HCC)       Past Surgical History:   Procedure Laterality Date   • CORONARY ARTERY BYPASS GRAFT     • ROTATOR CUFF REPAIR Left 03/15/2017        Current Outpatient Medications:   •  Accu-Chek Softclix Lancets lancets, 1 each by Other route 2 (two) times a day. Use as instructed, Disp: 200 each, Rfl: 3  •  aspirin 325 MG tablet, Take 325 mg by mouth Daily., Disp: , Rfl:   •  atorvastatin (LIPITOR) 40 MG tablet, Take 1 tablet by mouth once daily, Disp: 90 tablet, Rfl: 3  •  Blood Glucose Monitoring Suppl (Accu-Chek Guide Me) w/Device kit, USE 1  TO CHECK GLUCOSE THREE TIMES DAILY AS NEEDED BEFORE MEAL(S), Disp: 1 kit, Rfl: 0  •  Collagen-Vitamin C-Biotin (COLLAGEN 1500/C PO), Take  by mouth., Disp: , Rfl:   •  glucose blood (Accu-Chek Tram Plus) test strip, One twice daily , Disp: 100 each, Rfl: 12  •  glucose monitor monitoring kit, 1 each 2 (Two) Times a Day., Disp: 1 each, Rfl: 0  •  glyburide (DIAbeta) 5 MG tablet, TAKE 1 TABLETS BY MOUTH BEFORE BREAKFAST, LUNCH and SUPPER if BS >150, Disp: 180 tablet, Rfl: 3  •  KRILL OIL PO, Take 1 capsule by mouth Daily., Disp: , Rfl:   •  losartan (COZAAR) 50 MG tablet, Take 1 tablet by mouth Daily., Disp: 90 tablet, Rfl: 3  •  metFORMIN (GLUCOPHAGE) 1000 MG tablet, Take 1 tablet by mouth 2 (Two) Times a Day With Meals., Disp: 180 tablet, Rfl: 3  •  Multiple Vitamins-Minerals (MULTIVITAMIN MEN) tablet, Take 1 tablet by mouth Daily., Disp: , Rfl:   •  omeprazole (priLOSEC) 20 MG capsule, Take 1 capsule by mouth Daily., Disp: 90 capsule, Rfl: 3  •  Semaglutide,0.25 or 0.5MG/DOS, (Ozempic, 0.25 or 0.5 MG/DOSE,) 2 MG/1.5ML solution pen-injector, Inject 0.5 mg under the skin into the appropriate area as directed 1 (One) Time  Per Week., Disp: 2 mL, Rfl: 2  •  sildenafil (VIAGRA) 100 MG tablet, Take 1 tablet by mouth Daily As Needed for Erectile Dysfunction., Disp: 30 tablet, Rfl: 2  •  vitamin D (ERGOCALCIFEROL) 1.25 MG (04164 UT) capsule capsule, Take 1 capsule by mouth once a week, Disp: 5 capsule, Rfl: 11  •  zinc gluconate 50 MG tablet, Take 50 mg by mouth Daily., Disp: , Rfl:     Current Facility-Administered Medications:   •  cyanocobalamin injection 1,000 mcg, 1,000 mcg, Intramuscular, Q28 Days, Paramjit Shi MD, 1,000 mcg at 01/05/22 1512      Vitals:    01/14/22 0814   BP: 140/76   Pulse: 71   Temp: 96.9 °F (36.1 °C)   SpO2: 99%         01/14/22 0814   Weight: 87.5 kg (193 lb)       Body mass index is 31.15 kg/m².    Physical Exam  Vitals and nursing note reviewed.   Constitutional:       Appearance: He is well-developed.   HENT:      Head: Normocephalic and atraumatic.      Right Ear: External ear normal.      Left Ear: External ear normal.   Eyes:      Conjunctiva/sclera: Conjunctivae normal.      Pupils: Pupils are equal, round, and reactive to light.   Neck:      Thyroid: No thyromegaly.   Cardiovascular:      Rate and Rhythm: Normal rate and regular rhythm.      Heart sounds: Normal heart sounds.   Pulmonary:      Effort: Pulmonary effort is normal.      Breath sounds: Normal breath sounds.   Abdominal:      General: Bowel sounds are normal.      Palpations: Abdomen is soft.   Musculoskeletal:      Cervical back: Normal range of motion and neck supple.   Lymphadenopathy:      Cervical: No cervical adenopathy.   Skin:     General: Skin is warm and dry.   Neurological:      Mental Status: He is alert and oriented to person, place, and time.   Psychiatric:         Behavior: Behavior normal.         Thought Content: Thought content normal.               Assessment/Plan   Diagnoses and all orders for this visit:    1. Essential hypertension  -     Semaglutide,0.25 or 0.5MG/DOS, (Ozempic, 0.25 or 0.5 MG/DOSE,) 2 MG/1.5ML  solution pen-injector; Inject 0.5 mg under the skin into the appropriate area as directed 1 (One) Time Per Week.  Dispense: 2 mL; Refill: 2    2. Type 2 diabetes mellitus with hyperglycemia, without long-term current use of insulin (HCC)  -     POC Glycated Hemoglobin, Total  -     Semaglutide,0.25 or 0.5MG/DOS, (Ozempic, 0.25 or 0.5 MG/DOSE,) 2 MG/1.5ML solution pen-injector; Inject 0.5 mg under the skin into the appropriate area as directed 1 (One) Time Per Week.  Dispense: 2 mL; Refill: 2    3. Obesity (BMI 30.0-34.9)  -     Semaglutide,0.25 or 0.5MG/DOS, (Ozempic, 0.25 or 0.5 MG/DOSE,) 2 MG/1.5ML solution pen-injector; Inject 0.5 mg under the skin into the appropriate area as directed 1 (One) Time Per Week.  Dispense: 2 mL; Refill: 2      Continue working on weight with diet adjustments and Ozempic. Blood sugars are significantly better from 8.9 down to 6.5 and 5 months. Continue Ozempic at current dose along with other therapies. Patient will need to continue checking his blood sugars daily and reminded him to bring blood sugar logs for my evaluation. Blood pressure is slightly high in the office today however reports normal blood pressures at home. Would like for him to check his blood pressure 2 to 3 days a week if greater than 140/90 we will need to adjust his losartan to 100 mg. Follow-up in 3 months.

## 2022-01-26 ENCOUNTER — CLINICAL SUPPORT (OUTPATIENT)
Dept: INTERNAL MEDICINE | Facility: CLINIC | Age: 77
End: 2022-01-26

## 2022-01-26 DIAGNOSIS — D51.0 PERNICIOUS ANEMIA: Primary | ICD-10-CM

## 2022-01-26 PROCEDURE — 96372 THER/PROPH/DIAG INJ SC/IM: CPT | Performed by: NURSE PRACTITIONER

## 2022-01-26 RX ADMIN — CYANOCOBALAMIN 1000 MCG: 1000 INJECTION, SOLUTION INTRAMUSCULAR; SUBCUTANEOUS at 10:50

## 2022-02-07 RX ORDER — OMEPRAZOLE 20 MG/1
CAPSULE, DELAYED RELEASE ORAL
Qty: 90 CAPSULE | Refills: 3 | Status: SHIPPED | OUTPATIENT
Start: 2022-02-07 | End: 2022-06-10

## 2022-02-16 ENCOUNTER — CLINICAL SUPPORT (OUTPATIENT)
Dept: INTERNAL MEDICINE | Facility: CLINIC | Age: 77
End: 2022-02-16

## 2022-02-16 DIAGNOSIS — D51.0 PERNICIOUS ANEMIA: Primary | ICD-10-CM

## 2022-02-16 PROCEDURE — 96372 THER/PROPH/DIAG INJ SC/IM: CPT | Performed by: NURSE PRACTITIONER

## 2022-02-16 RX ADMIN — CYANOCOBALAMIN 1000 MCG: 1000 INJECTION, SOLUTION INTRAMUSCULAR; SUBCUTANEOUS at 10:09

## 2022-03-01 ENCOUNTER — CLINICAL SUPPORT (OUTPATIENT)
Dept: INTERNAL MEDICINE | Facility: CLINIC | Age: 77
End: 2022-03-01

## 2022-03-01 DIAGNOSIS — D51.0 PERNICIOUS ANEMIA: ICD-10-CM

## 2022-03-01 PROCEDURE — 96372 THER/PROPH/DIAG INJ SC/IM: CPT | Performed by: FAMILY MEDICINE

## 2022-03-01 RX ADMIN — CYANOCOBALAMIN 1000 MCG: 1000 INJECTION, SOLUTION INTRAMUSCULAR; SUBCUTANEOUS at 08:10

## 2022-03-02 ENCOUNTER — TELEPHONE (OUTPATIENT)
Dept: INTERNAL MEDICINE | Facility: CLINIC | Age: 77
End: 2022-03-02

## 2022-03-02 NOTE — TELEPHONE ENCOUNTER
Caller: Michael Novak    Relationship: Other    Best call back number: [phone]    What is the best time to reach you: ANYTIME    Who are you requesting to speak with (clinical staff, provider,  specific staff member):   CLINICAL STAFF    Do you know the name of the person who called:   RAE- FOUR SOFIA     What was the call regarding:   Rae garcia [facility] called and wanted to speak with pcp or nurse to confirm when patient was diagnosed with diabetic neuropathy.     Do you require a callback:   yes

## 2022-03-04 NOTE — TELEPHONE ENCOUNTER
I looked it up and its [employer] [facility] [employer]. He was here in the office earlier this week for his b12 injection. He also has a future appt with you.

## 2022-03-04 NOTE — TELEPHONE ENCOUNTER
He has intermittent issue in bilateral feet, but not consistently as discussed in office visits. I assumed he has switched offices?

## 2022-03-07 ENCOUNTER — OFFICE VISIT (OUTPATIENT)
Dept: INTERNAL MEDICINE | Facility: CLINIC | Age: 77
End: 2022-03-07

## 2022-03-07 ENCOUNTER — HOSPITAL ENCOUNTER (OUTPATIENT)
Dept: GENERAL RADIOLOGY | Facility: HOSPITAL | Age: 77
Discharge: HOME | End: 2022-03-07
Admitting: NURSE PRACTITIONER

## 2022-03-07 VITALS
TEMPERATURE: 97.3 F | HEART RATE: 77 BPM | DIASTOLIC BLOOD PRESSURE: 80 MMHG | OXYGEN SATURATION: 98 % | HEIGHT: 66 IN | BODY MASS INDEX: 31.5 KG/M2 | SYSTOLIC BLOOD PRESSURE: 132 MMHG | WEIGHT: 196 LBS

## 2022-03-07 DIAGNOSIS — M79.605 ACUTE LEG PAIN, LEFT: ICD-10-CM

## 2022-03-07 DIAGNOSIS — M17.12 ARTHRITIS OF KNEE, LEFT: ICD-10-CM

## 2022-03-07 DIAGNOSIS — E11.40 TYPE 2 DIABETES MELLITUS WITH DIABETIC NEUROPATHY, WITHOUT LONG-TERM CURRENT USE OF INSULIN: Primary | ICD-10-CM

## 2022-03-07 PROCEDURE — 73562 X-RAY EXAM OF KNEE 3: CPT

## 2022-03-07 PROCEDURE — 99213 OFFICE O/P EST LOW 20 MIN: CPT | Performed by: NURSE PRACTITIONER

## 2022-03-07 NOTE — PROGRESS NOTES
Subjective   Michael Novak is a 76 y.o. male.   Chief Complaint   Patient presents with   • Leg Pain     Muscles in upper and lower left leg. X 1 week. Discuss neuropathy and seeing [facility] for this.       Leg Pain   The incident occurred 5 to 7 days ago. There was no injury mechanism. The pain is present in the left knee, left thigh and left leg. The quality of the pain is described as aching. The pain is at a severity of 4/10. The pain is moderate. The pain has been worsening since onset. Associated symptoms include numbness. He reports no foreign bodies present. The symptoms are aggravated by movement, palpation and weight bearing. He has tried acetaminophen and non-weight bearing for the symptoms. The treatment provided mild relief.   Diabetes  He presents for his follow-up diabetic visit. He has type 2 diabetes mellitus. His disease course has been improving. There are no hypoglycemic associated symptoms. Pertinent negatives for hypoglycemia include no confusion, dizziness or nervousness/anxiousness. Associated symptoms include foot paresthesias. Pertinent negatives for diabetes include no blurred vision, no chest pain, no fatigue, no polydipsia, no polyphagia, no weakness and no weight loss. There are no hypoglycemic complications. Symptoms are worsening. Diabetic complications include heart disease and peripheral neuropathy. Risk factors for coronary artery disease include diabetes mellitus, dyslipidemia, male sex, hypertension and obesity. Current diabetic treatment includes oral agent (dual therapy) and diet. He is compliant with treatment most of the time. His weight is fluctuating minimally. He is following a generally healthy diet. Meal planning includes calorie counting. He participates in exercise intermittently. His breakfast blood glucose is taken between 8-9 am. His breakfast blood glucose range is generally  mg/dl. An ACE inhibitor/angiotensin II receptor  blocker is being taken. He does not see a podiatrist.Eye exam is not current.        The following portions of the patient's history were reviewed and updated as appropriate: allergies, current medications, past family history, past medical history, past social history, past surgical history and problem list.    Review of Systems   Constitutional: Negative for activity change, appetite change, fatigue, fever, unexpected weight gain and unexpected weight loss.   HENT: Negative for swollen glands, trouble swallowing and voice change.    Eyes: Negative for blurred vision and visual disturbance.   Respiratory: Negative for cough and shortness of breath.    Cardiovascular: Negative for chest pain, palpitations and leg swelling.   Gastrointestinal: Negative for abdominal pain, constipation, diarrhea, nausea, vomiting and indigestion.   Endocrine: Negative for cold intolerance, heat intolerance, polydipsia and polyphagia.   Genitourinary: Negative for dysuria and frequency.   Musculoskeletal: Positive for arthralgias, gait problem and joint swelling. Negative for back pain and neck pain.   Skin: Negative for color change, rash and skin lesions.   Neurological: Positive for numbness. Negative for dizziness, weakness, headache, memory problem and confusion.   Hematological: Does not bruise/bleed easily.   Psychiatric/Behavioral: Negative for agitation, hallucinations and suicidal ideas. The patient is not nervous/anxious.        Objective   Past Medical History:   Diagnosis Date   • Back pain    • Coronary artery disease    • Diabetes mellitus (HCC)    • Hearing deficit    • Hypertension    • Prostate cancer (HCC)       Past Surgical History:   Procedure Laterality Date   • CORONARY ARTERY BYPASS GRAFT     • ROTATOR CUFF REPAIR Left 03/15/2017        Current Outpatient Medications:   •  Accu-Chek Softclix Lancets lancets, 1 each by Other route 2 (two) times a day. Use as instructed, Disp: 200 each, Rfl: 3  •  aspirin 325 MG  tablet, Take 325 mg by mouth Daily., Disp: , Rfl:   •  atorvastatin (LIPITOR) 40 MG tablet, Take 1 tablet by mouth once daily, Disp: 90 tablet, Rfl: 3  •  Blood Glucose Monitoring Suppl (Accu-Chek Guide Me) w/Device kit, USE 1  TO CHECK GLUCOSE THREE TIMES DAILY AS NEEDED BEFORE MEAL(S), Disp: 1 kit, Rfl: 0  •  Collagen-Vitamin C-Biotin (COLLAGEN 1500/C PO), Take  by mouth., Disp: , Rfl:   •  glucose blood (Accu-Chek Tram Plus) test strip, One twice daily , Disp: 100 each, Rfl: 12  •  glyburide (DIAbeta) 5 MG tablet, TAKE 1 TABLETS BY MOUTH BEFORE BREAKFAST, LUNCH and SUPPER if BS >150, Disp: 180 tablet, Rfl: 3  •  KRILL OIL PO, Take 1 capsule by mouth Daily., Disp: , Rfl:   •  losartan (COZAAR) 50 MG tablet, Take 1 tablet by mouth Daily., Disp: 90 tablet, Rfl: 3  •  metFORMIN (GLUCOPHAGE) 1000 MG tablet, TAKE 1 TABLET BY MOUTH TWICE DAILY WITH MEALS, Disp: 180 tablet, Rfl: 1  •  Multiple Vitamins-Minerals (MULTIVITAMIN MEN) tablet, Take 1 tablet by mouth Daily., Disp: , Rfl:   •  omeprazole (priLOSEC) 20 MG capsule, Take 1 capsule by mouth once daily, Disp: 90 capsule, Rfl: 3  •  Semaglutide,0.25 or 0.5MG/DOS, (Ozempic, 0.25 or 0.5 MG/DOSE,) 2 MG/1.5ML solution pen-injector, Inject 0.5 mg under the skin into the appropriate area as directed 1 (One) Time Per Week., Disp: 2 mL, Rfl: 2  •  sildenafil (VIAGRA) 100 MG tablet, Take 1 tablet by mouth Daily As Needed for Erectile Dysfunction., Disp: 30 tablet, Rfl: 2  •  vitamin D (ERGOCALCIFEROL) 1.25 MG (59122 UT) capsule capsule, Take 1 capsule by mouth once a week, Disp: 5 capsule, Rfl: 11  •  zinc gluconate 50 MG tablet, Take 50 mg by mouth Daily., Disp: , Rfl:   •  glucose monitor monitoring kit, 1 each 2 (Two) Times a Day., Disp: 1 each, Rfl: 0    Current Facility-Administered Medications:   •  cyanocobalamin injection 1,000 mcg, 1,000 mcg, Intramuscular, Q28 Days, Paramjit Shi MD, 1,000 mcg at 03/01/22 0810      Vitals:    03/07/22 1442   BP: 132/80   Pulse:  77   Temp: 97.3 °F (36.3 °C)   SpO2: 98%         03/07/22  1442   Weight: 88.9 kg (196 lb)       Body mass index is 31.64 kg/m².    Physical Exam  Vitals and nursing note reviewed.   Constitutional:       Appearance: He is well-developed.   HENT:      Head: Normocephalic and atraumatic.      Right Ear: External ear normal.      Left Ear: External ear normal.   Eyes:      Conjunctiva/sclera: Conjunctivae normal.      Pupils: Pupils are equal, round, and reactive to light.   Neck:      Thyroid: No thyromegaly.   Cardiovascular:      Rate and Rhythm: Normal rate and regular rhythm.      Pulses:           Posterior tibial pulses are 2+ on the right side and 2+ on the left side.      Heart sounds: Normal heart sounds.   Pulmonary:      Effort: Pulmonary effort is normal.      Breath sounds: Normal breath sounds.   Abdominal:      General: Bowel sounds are normal.      Palpations: Abdomen is soft.   Musculoskeletal:      Cervical back: Normal range of motion and neck supple.      Left knee: Swelling and bony tenderness present. Decreased range of motion. Tenderness present over the patellar tendon.      Left lower leg: Tenderness and bony tenderness present. No swelling or deformity. No edema.        Feet:    Feet:      Right foot:      Protective Sensation: 5 sites tested. 2 sites sensed.      Skin integrity: Callus present.      Left foot:      Protective Sensation: 5 sites tested. 2 sites sensed.      Skin integrity: Callus present.   Lymphadenopathy:      Cervical: No cervical adenopathy.   Skin:     General: Skin is warm and dry.   Neurological:      Mental Status: He is alert and oriented to person, place, and time.   Psychiatric:         Behavior: Behavior normal.         Thought Content: Thought content normal.       Assessment/Plan   Diagnoses and all orders for this visit:    1. Type 2 diabetes mellitus with diabetic neuropathy, without long-term current use of insulin (HCC) (Primary)    2. Acute leg pain,  left  -     XR Knee 3 View Left; Future  -     Ambulatory Referral to Orthopedic Surgery    3. Arthritis of knee, left  -     Ambulatory Referral to Orthopedic Surgery      Patient has significant left knee pain and xray showed arthritis in left knee. Will refer to [facility] for assessment and treatment plan. Advised use tylenol as needed, ice/heat rotation, and wear knee brace/compression.

## 2022-03-17 ENCOUNTER — CLINICAL SUPPORT (OUTPATIENT)
Dept: INTERNAL MEDICINE | Facility: CLINIC | Age: 77
End: 2022-03-17

## 2022-03-17 DIAGNOSIS — D51.0 PERNICIOUS ANEMIA: ICD-10-CM

## 2022-03-17 PROCEDURE — 96372 THER/PROPH/DIAG INJ SC/IM: CPT | Performed by: FAMILY MEDICINE

## 2022-03-17 RX ADMIN — CYANOCOBALAMIN 1000 MCG: 1000 INJECTION, SOLUTION INTRAMUSCULAR; SUBCUTANEOUS at 08:21

## 2022-04-01 ENCOUNTER — CLINICAL SUPPORT (OUTPATIENT)
Dept: INTERNAL MEDICINE | Facility: CLINIC | Age: 77
End: 2022-04-01

## 2022-04-01 DIAGNOSIS — D51.0 PERNICIOUS ANEMIA: Primary | ICD-10-CM

## 2022-04-01 PROCEDURE — 96372 THER/PROPH/DIAG INJ SC/IM: CPT | Performed by: FAMILY MEDICINE

## 2022-04-01 RX ADMIN — CYANOCOBALAMIN 1000 MCG: 1000 INJECTION, SOLUTION INTRAMUSCULAR; SUBCUTANEOUS at 11:23

## 2022-04-04 ENCOUNTER — OFFICE VISIT (OUTPATIENT)
Dept: CARDIOLOGY CLINIC | Age: 77
End: 2022-04-04
Payer: MEDICARE

## 2022-04-04 VITALS
WEIGHT: 190 LBS | SYSTOLIC BLOOD PRESSURE: 138 MMHG | HEIGHT: 66 IN | HEART RATE: 74 BPM | DIASTOLIC BLOOD PRESSURE: 62 MMHG | BODY MASS INDEX: 30.53 KG/M2

## 2022-04-04 DIAGNOSIS — E78.2 MIXED HYPERLIPIDEMIA: ICD-10-CM

## 2022-04-04 DIAGNOSIS — Z95.1 STATUS POST AORTO-CORONARY ARTERY BYPASS GRAFT: ICD-10-CM

## 2022-04-04 DIAGNOSIS — I25.10 CORONARY ARTERY DISEASE INVOLVING NATIVE CORONARY ARTERY OF NATIVE HEART WITHOUT ANGINA PECTORIS: Primary | ICD-10-CM

## 2022-04-04 PROCEDURE — 99213 OFFICE O/P EST LOW 20 MIN: CPT | Performed by: INTERNAL MEDICINE

## 2022-04-04 ASSESSMENT — ENCOUNTER SYMPTOMS
EYES NEGATIVE: 1
VOMITING: 0
SHORTNESS OF BREATH: 0
RESPIRATORY NEGATIVE: 1
DIARRHEA: 0
GASTROINTESTINAL NEGATIVE: 1
NAUSEA: 0

## 2022-04-04 NOTE — PROGRESS NOTES
[facility] Progress Note                            Date:  4/4/2022  Patient: Lance Logan. Age:  67 y.o., 1945      Reason for evaluation:         SUBJECTIVE:    Returns today follow-up assessment follow-up for coronary disease previous CABG hyperlipidemia overall doing well. Last lipid profile was 8/26/2021 LDL cholesterol 116. Slightly dizzy recently no new medication changes to account for that we will keep an eye on that. No other complaints or issues reported. Denies anginal chest pain or limiting dyspnea. Blood pressure 138/62 heart 74. Review of Systems   Constitutional: Negative. Negative for chills, fever and unexpected weight change. HENT: Negative. Eyes: Negative. Respiratory: Negative. Negative for shortness of breath. Cardiovascular: Negative. Negative for chest pain. Gastrointestinal: Negative. Negative for diarrhea, nausea and vomiting. Endocrine: Negative. Genitourinary: Negative. Musculoskeletal: Negative. Skin: Negative. Neurological: Negative. All other systems reviewed and are negative. OBJECTIVE:     /62   Pulse 74   Ht 5' 6\" (1.676 m)   Wt 190 lb (86.2 kg)   BMI 30.67 kg/m²     Labs:   CBC: No results for input(s): WBC, HGB, HCT, PLT in the last 72 hours. BMP:No results for input(s): NA, K, CO2, BUN, CREATININE, LABGLOM, GLUCOSE in the last 72 hours. BNP: No results for input(s): BNP in the last 72 hours. PT/INR: No results for input(s): PROTIME, INR in the last 72 hours. APTT:No results for input(s): APTT in the last 72 hours. CARDIAC ENZYMES:No results for input(s): CKTOTAL, CKMB, CKMBINDEX, TROPONINI in the last 72 hours. FASTING LIPID PANEL:  Lab Results   Component Value Date    HDL 41 09/23/2020    LDLCALC 62 09/23/2020    TRIG 148 09/23/2020     LIVER PROFILE:No results for input(s): AST, ALT, LABALBU in the last 72 hours.         Past Medical History:   Diagnosis Date    CAD (coronary artery disease)     Chronic back pain     Diverticulitis     Hip pain     right    Hyperlipidemia     Hypertension     Knee pain     Shoulder pain     Type II or unspecified type diabetes mellitus without mention of complication, not stated as uncontrolled      Past Surgical History:   Procedure Laterality Date    CORONARY ARTERY BYPASS GRAFT      [place] COLONOSCOPY W/BIOPSY SINGLE/MULTIPLE N/A 12/15/2017    Dr Deandre MUÑIZ (-) dysplasia x 1, HP x 1, mucosa--5 yr recall    [place] EGD TRANSORAL BIOPSY SINGLE/MULTIPLE N/A 12/15/2017    Dr Flynn [place] (-)    SHOULDER ARTHROSCOPY Left 3/16/2017    SHOULDER ARTHROSCOPIC SUBACROMIAL DECOMPRESSION, DISTAL CLAVICLE RESECTION, GLENUHUMERAL SYNOVECTOMY, OPEN ROTATOR CUFF REPAIR AND OPEN BICEPS TENODESIS LEFT  performed by Yue Benítez DO at [address] [place] [address]      age 9     Family History   Problem Relation Age of Onset    Cancer Mother     Heart Disease Father     Colon Cancer Neg Hx     Colon Polyps Neg Hx     Liver Cancer Neg Hx     Liver Disease Neg Hx     Esophageal Cancer Neg Hx     Rectal Cancer Neg Hx     Stomach Cancer Neg Hx      Allergies   Allergen Reactions    Ace Inhibitors      Other reaction(s): Cough    Valium [Diazepam] Nausea Only     Current Outpatient Medications   Medication Sig Dispense Refill    vitamin D (ERGOCALCIFEROL) 1.25 MG (72285 UT) CAPS capsule Take 50,000 Units by mouth once a week      Cyanocobalamin (VITAMIN B 12 PO) Take by mouth Twice a month      zinc gluconate 50 MG tablet Take 50 mg by mouth daily      losartan (COZAAR) 25 MG tablet Take 0.5 tablets by mouth daily 90 tablet 3    omeprazole (PRILOSEC) 20 MG delayed release capsule Take 20 mg by mouth daily      Aspirin Buf,[id number]-UfAsc, (BUFFERED ASPIRIN) 325 MG TABS Take 1 tablet by mouth once      glyBURIDE (DIABETA) 5 MG tablet Take 2.5 mg by mouth 2 times daily (with meals)       atorvastatin (LIPITOR) 20 MG tablet Take 20 mg by mouth daily      Multiple Vitamins-Minerals (MULTIVITAMIN PO) Take  by mouth daily.  metFORMIN (GLUCOPHAGE) 500 MG tablet Take 1,000 mg by mouth 2 times daily (with meals)        No current facility-administered medications for this visit. Social History     Socioeconomic History    Marital status:      Spouse name: Not on file    Number of children: Not on file    Years of education: Not on file    Highest education level: Not on file   Occupational History    Not on file   Tobacco Use    Smoking status: Former Smoker     Packs/day: 0.50     Years: 10.00     Pack years: 5.00     Types: Cigarettes     Quit date: 65     Years since quittin.2    Smokeless tobacco: Never Used   Vaping Use    Vaping Use: Not on file   Substance and Sexual Activity    Alcohol use: No    Drug use: No    Sexual activity: Not on file   Other Topics Concern    Not on file   Social History Narrative    Not on file     Social Determinants of Health     Financial Resource Strain:     Difficulty of Paying Living Expenses: Not on file   Food Insecurity:     Worried About [address] in the Last Year: Not on file    [address] in the Last Year: Not on file   Transportation Needs:     Lack of Transportation (Medical): Not on file    Lack of Transportation (Non-Medical):  Not on file   Physical Activity:     Days of Exercise per Week: Not on file    Minutes of Exercise per Session: Not on file   Stress:     Feeling of Stress : Not on file   Social Connections:     Frequency of Communication with Friends and Family: Not on file    Frequency of Social Gatherings with Friends and Family: Not on file    Attends Latter day Services: Not on file    Active Member of Clubs or Organizations: Not on file    Attends Club or Organization Meetings: Not on file    Marital Status: Not on file   Intimate Partner Violence:     Fear of Current or Ex-Partner: Not on file    Emotionally Abused: Not on file    Physically Abused: Not on file    Sexually Abused: Not on file   Housing Stability:     Unable to Pay for Housing in the Last Year: Not on file    Number of Places Lived in the Last Year: Not on file    Unstable Housing in the Last Year: Not on file       Physical Examination:  /62   Pulse 74   Ht 5' 6\" (1.676 m)   Wt 190 lb (86.2 kg)   BMI 30.67 kg/m²   Physical Exam  Vitals reviewed. Constitutional:       Appearance: He is well-developed. Neck:      Vascular: No carotid bruit or JVD. Cardiovascular:      Rate and Rhythm: Normal rate and regular rhythm. Heart sounds: Normal heart sounds. No murmur heard. No friction rub. No gallop. Pulmonary:      Effort: Pulmonary effort is normal. No respiratory distress. Breath sounds: Normal breath sounds. No wheezing or rales. Abdominal:      General: There is no distension. Tenderness: There is no abdominal tenderness. Lymphadenopathy:      Cervical: No cervical adenopathy. Skin:     General: Skin is warm and dry. ASSESSMENT:     Diagnosis Orders   1. Coronary artery disease involving native coronary artery of native heart without angina pectoris  Lipid Panel   2. Mixed hyperlipidemia  Lipid Panel   3. Status post aorto-coronary artery bypass graft  Lipid Panel       PLAN:  Orders Placed This Encounter   Procedures    Lipid Panel     No orders of the defined types were placed in this encounter. 1. Continue present medications  2. Recommend follow-up assessment in 6 months    Return in about 6 months (around 10/4/2022) for return to Dr. Shannan Perrin only. Dilia Pelaez MD 4/4/2022 2:22 PM CDT    [facility]      Thisdictation was generated by voice recognition computer software. Although all attempts are made to edit the dictation for accuracy, there may be errors in the transcription that are not intended.

## 2022-04-05 DIAGNOSIS — E11.65 TYPE 2 DIABETES MELLITUS WITH HYPERGLYCEMIA, WITHOUT LONG-TERM CURRENT USE OF INSULIN: ICD-10-CM

## 2022-04-05 RX ORDER — GLYBURIDE 5 MG/1
TABLET ORAL
Qty: 270 TABLET | Refills: 0 | Status: SHIPPED | OUTPATIENT
Start: 2022-04-05 | End: 2022-06-10

## 2022-04-19 ENCOUNTER — CLINICAL SUPPORT (OUTPATIENT)
Dept: INTERNAL MEDICINE | Facility: CLINIC | Age: 77
End: 2022-04-19

## 2022-04-19 DIAGNOSIS — D51.0 PERNICIOUS ANEMIA: ICD-10-CM

## 2022-04-19 PROCEDURE — 96372 THER/PROPH/DIAG INJ SC/IM: CPT | Performed by: FAMILY MEDICINE

## 2022-04-19 RX ADMIN — CYANOCOBALAMIN 1000 MCG: 1000 INJECTION, SOLUTION INTRAMUSCULAR; SUBCUTANEOUS at 15:52

## 2022-05-03 ENCOUNTER — CLINICAL SUPPORT (OUTPATIENT)
Dept: INTERNAL MEDICINE | Facility: CLINIC | Age: 77
End: 2022-05-03

## 2022-05-03 DIAGNOSIS — D51.0 PERNICIOUS ANEMIA: ICD-10-CM

## 2022-05-03 PROCEDURE — 96372 THER/PROPH/DIAG INJ SC/IM: CPT

## 2022-05-03 RX ADMIN — CYANOCOBALAMIN 1000 MCG: 1000 INJECTION, SOLUTION INTRAMUSCULAR; SUBCUTANEOUS at 11:14

## 2022-05-16 ENCOUNTER — CLINICAL SUPPORT (OUTPATIENT)
Dept: INTERNAL MEDICINE | Facility: CLINIC | Age: 77
End: 2022-05-16

## 2022-05-16 DIAGNOSIS — D51.0 PERNICIOUS ANEMIA: Primary | ICD-10-CM

## 2022-05-16 PROCEDURE — 96372 THER/PROPH/DIAG INJ SC/IM: CPT

## 2022-05-16 RX ADMIN — CYANOCOBALAMIN 1000 MCG: 1000 INJECTION, SOLUTION INTRAMUSCULAR; SUBCUTANEOUS at 08:13

## 2022-06-01 ENCOUNTER — CLINICAL SUPPORT (OUTPATIENT)
Dept: INTERNAL MEDICINE | Facility: CLINIC | Age: 77
End: 2022-06-01

## 2022-06-01 DIAGNOSIS — D51.0 PERNICIOUS ANEMIA: ICD-10-CM

## 2022-06-01 PROCEDURE — 96372 THER/PROPH/DIAG INJ SC/IM: CPT

## 2022-06-01 RX ADMIN — CYANOCOBALAMIN 1000 MCG: 1000 INJECTION, SOLUTION INTRAMUSCULAR; SUBCUTANEOUS at 08:52

## 2022-06-10 ENCOUNTER — OFFICE VISIT (OUTPATIENT)
Dept: INTERNAL MEDICINE | Facility: CLINIC | Age: 77
End: 2022-06-10

## 2022-06-10 VITALS
SYSTOLIC BLOOD PRESSURE: 130 MMHG | WEIGHT: 184 LBS | TEMPERATURE: 97.1 F | BODY MASS INDEX: 29.57 KG/M2 | OXYGEN SATURATION: 99 % | DIASTOLIC BLOOD PRESSURE: 64 MMHG | HEIGHT: 66 IN | HEART RATE: 66 BPM

## 2022-06-10 DIAGNOSIS — E78.2 MIXED HYPERLIPIDEMIA: ICD-10-CM

## 2022-06-10 DIAGNOSIS — E11.65 TYPE 2 DIABETES MELLITUS WITH HYPERGLYCEMIA, WITHOUT LONG-TERM CURRENT USE OF INSULIN: Primary | ICD-10-CM

## 2022-06-10 LAB — HBA1C MFR BLD: 6.5 %

## 2022-06-10 PROCEDURE — 3044F HG A1C LEVEL LT 7.0%: CPT

## 2022-06-10 PROCEDURE — 83036 HEMOGLOBIN GLYCOSYLATED A1C: CPT

## 2022-06-10 PROCEDURE — 99214 OFFICE O/P EST MOD 30 MIN: CPT

## 2022-06-10 RX ORDER — GLYBURIDE 5 MG/1
TABLET ORAL
Qty: 180 TABLET | Refills: 0 | Status: SHIPPED | OUTPATIENT
Start: 2022-06-10 | End: 2022-07-25

## 2022-06-10 NOTE — PROGRESS NOTES
Subjective   Michael Novak is a 76 y.o. male.   Chief Complaint   Patient presents with   • Diabetes     3 month follow up : A1c: 6.5       History of Present Illness    is here today for 3 month follow up on his type 2 diabetes and elevated cholesterol.  He reports that he has not had his ozempic in over a month.   He has made a lot of changes in his diet. Reports increase in proteins, eats a lot of turkey, chicken and fish. He eats very low carbohydrates. Might have a half cup of blueberries at night. He reports that he feels better, has more energy. Denies any chest pain or shortness of breath. Has had no gi symptoms.   Denies any issues or concerns today.   The following portions of the patient's history were reviewed and updated as appropriate: allergies, current medications, past family history, past medical history, past social history, past surgical history and problem list.    Review of Systems    Objective   Past Medical History:   Diagnosis Date   • Back pain    • Coronary artery disease    • Diabetes mellitus (HCC)    • Hearing deficit    • Hypertension    • Prostate cancer (HCC)       Past Surgical History:   Procedure Laterality Date   • CORONARY ARTERY BYPASS GRAFT     • ROTATOR CUFF REPAIR Left 03/15/2017        Current Outpatient Medications:   •  Accu-Chek Softclix Lancets lancets, 1 each by Other route 2 (two) times a day. Use as instructed, Disp: 200 each, Rfl: 3  •  aspirin 325 MG tablet, Take 325 mg by mouth Daily., Disp: , Rfl:   •  atorvastatin (LIPITOR) 40 MG tablet, Take 1 tablet by mouth once daily, Disp: 90 tablet, Rfl: 3  •  Blood Glucose Monitoring Suppl (Accu-Chek Guide Me) w/Device kit, USE 1  TO CHECK GLUCOSE THREE TIMES DAILY AS NEEDED BEFORE MEAL(S), Disp: 1 kit, Rfl: 0  •  Collagen-Vitamin C-Biotin (COLLAGEN 1500/C PO), Take  by mouth., Disp: , Rfl:   •  glucose blood (Accu-Chek Tram Plus) test strip, One twice daily , Disp: 100 each, Rfl: 12  •  glucose monitor  "monitoring kit, 1 each 2 (Two) Times a Day., Disp: 1 each, Rfl: 0  •  glyburide (DIAbeta) 5 MG tablet, TAKE ONE TABLET BY MOUTH BEFORE BREAKFAST AND ONE TABLET BEFORE SUPPER, Disp: 180 tablet, Rfl: 0  •  KRILL OIL PO, Take 1 capsule by mouth Daily., Disp: , Rfl:   •  losartan (COZAAR) 50 MG tablet, Take 1 tablet by mouth Daily., Disp: 90 tablet, Rfl: 3  •  metFORMIN (GLUCOPHAGE) 1000 MG tablet, TAKE 1 TABLET BY MOUTH TWICE DAILY WITH MEALS, Disp: 180 tablet, Rfl: 1  •  Multiple Vitamins-Minerals (MULTIVITAMIN MEN) tablet, Take 1 tablet by mouth Daily., Disp: , Rfl:   •  sildenafil (VIAGRA) 100 MG tablet, Take 1 tablet by mouth Daily As Needed for Erectile Dysfunction., Disp: 30 tablet, Rfl: 2  •  vitamin D (ERGOCALCIFEROL) 1.25 MG (73023 UT) capsule capsule, Take 1 capsule by mouth once a week, Disp: 5 capsule, Rfl: 11  •  zinc gluconate 50 MG tablet, Take 50 mg by mouth Daily., Disp: , Rfl:     Current Facility-Administered Medications:   •  cyanocobalamin injection 1,000 mcg, 1,000 mcg, Intramuscular, Q28 Days, Paramjit Shi MD, 1,000 mcg at 06/01/22 0852      /64 (BP Location: Left arm, Patient Position: Sitting, Cuff Size: Adult)   Pulse 66   Temp 97.1 °F (36.2 °C) (Temporal)   Ht 167.6 cm (66\")   Wt 83.5 kg (184 lb)   SpO2 99%   BMI 29.70 kg/m²      Body mass index is 29.7 kg/m².  BMI is >= 25 and <30. (Overweight) The following options were offered after discussion;: exercise counseling/recommendations and nutrition counseling/recommendations       Physical Exam  Vitals and nursing note reviewed.   Constitutional:       General: He is not in acute distress.     Appearance: Normal appearance. He is normal weight. He is not ill-appearing, toxic-appearing or diaphoretic.   HENT:      Head: Normocephalic and atraumatic.      Right Ear: Tympanic membrane, ear canal and external ear normal. There is no impacted cerumen.      Left Ear: Tympanic membrane, ear canal and external ear normal. There is no " impacted cerumen.      Ears:      Comments: Dry cerumen in left ear, encouraged use of sweet oil       Nose: Nose normal.      Mouth/Throat:      Mouth: Mucous membranes are moist.      Pharynx: Oropharynx is clear. No oropharyngeal exudate or posterior oropharyngeal erythema.   Eyes:      General:         Right eye: No discharge.         Left eye: No discharge.      Extraocular Movements: Extraocular movements intact.      Conjunctiva/sclera: Conjunctivae normal.      Pupils: Pupils are equal, round, and reactive to light.   Neck:      Vascular: No carotid bruit.   Cardiovascular:      Rate and Rhythm: Normal rate and regular rhythm.      Pulses: Normal pulses.      Heart sounds: Normal heart sounds.   Pulmonary:      Effort: Pulmonary effort is normal.      Breath sounds: Normal breath sounds.   Abdominal:      General: Abdomen is flat. Bowel sounds are normal.      Palpations: Abdomen is soft.   Musculoskeletal:         General: Normal range of motion.      Cervical back: Normal range of motion and neck supple. No rigidity or tenderness.   Skin:     General: Skin is warm and dry.      Capillary Refill: Capillary refill takes less than 2 seconds.   Neurological:      General: No focal deficit present.      Mental Status: He is alert and oriented to person, place, and time. Mental status is at baseline.   Psychiatric:         Mood and Affect: Mood normal.         Behavior: Behavior normal.         Thought Content: Thought content normal.         Judgment: Judgment normal.               Assessment & Plan   Diagnoses and all orders for this visit:    1. Type 2 diabetes mellitus without hyperglycemia, without long-term current use of insulin (HCC) (Primary)  -     POC Glycated Hemoglobin, Total  -     glyburide (DIAbeta) 5 MG tablet; TAKE ONE TABLET BY MOUTH BEFORE BREAKFAST AND ONE TABLET BEFORE SUPPER  Dispense: 180 tablet; Refill: 0    2. Mixed hyperlipidemia  -     Lipid panel                Plan of care and a1c  reviewed with .   A1C today is 6.5, consistent with 4 months ago and he has not been on his ozempic. Will continue to keep this off of his mediations. He has made great strides with his diet, plans to continue with this and is interested in titrating down his medications. Will start with the glyburide, will have him take 2 tablets total daily instead of three and recheck A1C in 3 months. He is fasting today so we will check his lipid panel.   Blood pressure is well controlled right now, will continue with current therapy.   Will see in 3 months prior to this as needed.

## 2022-06-11 LAB
CHOLEST SERPL-MCNC: 208 MG/DL (ref 0–200)
HDLC SERPL-MCNC: 63 MG/DL (ref 40–60)
LDLC SERPL CALC-MCNC: 122 MG/DL (ref 0–100)
TRIGL SERPL-MCNC: 131 MG/DL (ref 0–150)
VLDLC SERPL CALC-MCNC: 23 MG/DL (ref 5–40)

## 2022-06-13 NOTE — PROGRESS NOTES
"Total cholesterol is slightly elevated at 208, we want this less than 200 ideally.  LDL which is \"bad cholesterol\" is slightly elevated at 122, however good cholesterol HDL is at 63, at this point in time I would advise you just continue to take your 40 mg of atorvastatin daily.  I would advise avoiding/decreasing intake of fried foods, highly processed foods, fast foods and increasing intake of good fiber, fruits and vegetables, lean proteins.  We will reassess at your next visit."

## 2022-06-15 ENCOUNTER — CLINICAL SUPPORT (OUTPATIENT)
Dept: INTERNAL MEDICINE | Facility: CLINIC | Age: 77
End: 2022-06-15

## 2022-06-15 DIAGNOSIS — D51.0 PERNICIOUS ANEMIA: ICD-10-CM

## 2022-06-15 PROCEDURE — 96372 THER/PROPH/DIAG INJ SC/IM: CPT | Performed by: NURSE PRACTITIONER

## 2022-06-15 RX ADMIN — CYANOCOBALAMIN 1000 MCG: 1000 INJECTION, SOLUTION INTRAMUSCULAR; SUBCUTANEOUS at 08:22

## 2022-07-05 ENCOUNTER — CLINICAL SUPPORT (OUTPATIENT)
Dept: INTERNAL MEDICINE | Facility: CLINIC | Age: 77
End: 2022-07-05

## 2022-07-05 DIAGNOSIS — D51.0 PERNICIOUS ANEMIA: ICD-10-CM

## 2022-07-05 PROCEDURE — 96372 THER/PROPH/DIAG INJ SC/IM: CPT | Performed by: FAMILY MEDICINE

## 2022-07-05 RX ORDER — CYANOCOBALAMIN 1000 UG/ML
1000 INJECTION, SOLUTION INTRAMUSCULAR; SUBCUTANEOUS
Status: SHIPPED | OUTPATIENT
Start: 2022-07-05

## 2022-07-05 RX ADMIN — CYANOCOBALAMIN 1000 MCG: 1000 INJECTION, SOLUTION INTRAMUSCULAR; SUBCUTANEOUS at 08:09

## 2022-07-15 ENCOUNTER — CLINICAL SUPPORT (OUTPATIENT)
Dept: INTERNAL MEDICINE | Facility: CLINIC | Age: 77
End: 2022-07-15

## 2022-07-15 DIAGNOSIS — D51.0 PERNICIOUS ANEMIA: ICD-10-CM

## 2022-07-15 PROCEDURE — 96372 THER/PROPH/DIAG INJ SC/IM: CPT | Performed by: FAMILY MEDICINE

## 2022-07-15 RX ADMIN — CYANOCOBALAMIN 1000 MCG: 1000 INJECTION, SOLUTION INTRAMUSCULAR; SUBCUTANEOUS at 08:15

## 2022-07-20 ENCOUNTER — APPOINTMENT (OUTPATIENT)
Dept: CT IMAGING | Age: 77
End: 2022-07-20
Payer: MEDICARE

## 2022-07-20 ENCOUNTER — HOSPITAL ENCOUNTER (OUTPATIENT)
Age: 77
Setting detail: OBSERVATION
Discharge: HOME | End: 2022-07-21
Attending: PEDIATRICS | Admitting: INTERNAL MEDICINE
Payer: MEDICARE

## 2022-07-20 ENCOUNTER — APPOINTMENT (OUTPATIENT)
Dept: GENERAL RADIOLOGY | Age: 77
End: 2022-07-20
Payer: MEDICARE

## 2022-07-20 DIAGNOSIS — R40.4 TRANSIENT ALTERATION OF AWARENESS: ICD-10-CM

## 2022-07-20 DIAGNOSIS — R73.9 HYPERGLYCEMIA: Primary | ICD-10-CM

## 2022-07-20 DIAGNOSIS — R79.89 ELEVATED LACTIC ACID LEVEL: ICD-10-CM

## 2022-07-20 LAB
ALBUMIN SERPL-MCNC: 4.3 G/DL (ref 3.5–5.2)
ALP BLD-CCNC: 75 U/L (ref 40–130)
ALT SERPL-CCNC: 39 U/L (ref 5–41)
ANION GAP SERPL CALCULATED.3IONS-SCNC: 17 MMOL/L (ref 7–19)
AST SERPL-CCNC: 34 U/L (ref 5–40)
BACTERIA: NEGATIVE /HPF
BASOPHILS ABSOLUTE: 0 K/UL (ref 0–0.2)
BASOPHILS RELATIVE PERCENT: 0.4 % (ref 0–1)
BILIRUB SERPL-MCNC: 0.3 MG/DL (ref 0.2–1.2)
BILIRUBIN URINE: NEGATIVE
BLOOD, URINE: NEGATIVE
BUN BLDV-MCNC: 27 MG/DL (ref 8–23)
C-REACTIVE PROTEIN: 0.52 MG/DL (ref 0–0.5)
CALCIUM SERPL-MCNC: 9.9 MG/DL (ref 8.8–10.2)
CHLORIDE BLD-SCNC: 103 MMOL/L (ref 98–111)
CHP ED QC CHECK: NORMAL
CLARITY: CLEAR
CO2: 21 MMOL/L (ref 22–29)
COLOR: YELLOW
CREAT SERPL-MCNC: 0.9 MG/DL (ref 0.5–1.2)
CRYSTALS, UA: ABNORMAL /HPF
EOSINOPHILS ABSOLUTE: 0.1 K/UL (ref 0–0.6)
EOSINOPHILS RELATIVE PERCENT: 1.7 % (ref 0–5)
EPITHELIAL CELLS, UA: 0 /HPF (ref 0–5)
GFR AFRICAN AMERICAN: >59
GFR NON-AFRICAN AMERICAN: >60
GLUCOSE BLD-MCNC: 118 MG/DL
GLUCOSE BLD-MCNC: 118 MG/DL (ref 70–99)
GLUCOSE BLD-MCNC: 71 MG/DL (ref 74–109)
GLUCOSE BLD-MCNC: 85 MG/DL (ref 70–99)
GLUCOSE URINE: NEGATIVE MG/DL
HCT VFR BLD CALC: 42.3 % (ref 42–52)
HEMOGLOBIN: 13.5 G/DL (ref 14–18)
HYALINE CASTS: 2 /HPF (ref 0–8)
IMMATURE GRANULOCYTES #: 0 K/UL
KETONES, URINE: ABNORMAL MG/DL
LACTIC ACID: 2.4 MMOL/L (ref 0.5–1.9)
LACTIC ACID: 4.2 MMOL/L (ref 0.5–1.9)
LEUKOCYTE ESTERASE, URINE: NEGATIVE
LYMPHOCYTES ABSOLUTE: 1.6 K/UL (ref 1.1–4.5)
LYMPHOCYTES RELATIVE PERCENT: 21.6 % (ref 20–40)
MCH RBC QN AUTO: 29.5 PG (ref 27–31)
MCHC RBC AUTO-ENTMCNC: 31.9 G/DL (ref 33–37)
MCV RBC AUTO: 92.4 FL (ref 80–94)
MONOCYTES ABSOLUTE: 0.9 K/UL (ref 0–0.9)
MONOCYTES RELATIVE PERCENT: 12.3 % (ref 0–10)
NEUTROPHILS ABSOLUTE: 4.6 K/UL (ref 1.5–7.5)
NEUTROPHILS RELATIVE PERCENT: 63.6 % (ref 50–65)
NITRITE, URINE: NEGATIVE
PDW BLD-RTO: 14.9 % (ref 11.5–14.5)
PERFORMED ON: ABNORMAL
PERFORMED ON: NORMAL
PH UA: 5 (ref 5–8)
PLATELET # BLD: 178 K/UL (ref 130–400)
PMV BLD AUTO: 10.7 FL (ref 9.4–12.4)
POTASSIUM REFLEX MAGNESIUM: 3.9 MMOL/L (ref 3.5–5)
PROCALCITONIN: 0.04 NG/ML (ref 0–0.09)
PROTEIN UA: 30 MG/DL
RBC # BLD: 4.58 M/UL (ref 4.7–6.1)
RBC UA: 2 /HPF (ref 0–4)
SARS-COV-2, NAAT: NOT DETECTED
SODIUM BLD-SCNC: 141 MMOL/L (ref 136–145)
SPECIFIC GRAVITY UA: 1.03 (ref 1–1.03)
TOTAL CK: 236 U/L (ref 39–308)
TOTAL PROTEIN: 7 G/DL (ref 6.6–8.7)
TROPONIN: <0.01 NG/ML (ref 0–0.03)
TSH SERPL DL<=0.05 MIU/L-ACNC: 4.89 UIU/ML (ref 0.27–4.2)
UROBILINOGEN, URINE: 1 E.U./DL
VITAMIN B-12: 1249 PG/ML (ref 211–946)
WBC # BLD: 7.2 K/UL (ref 4.8–10.8)
WBC UA: 1 /HPF (ref 0–5)

## 2022-07-20 PROCEDURE — 87040 BLOOD CULTURE FOR BACTERIA: CPT

## 2022-07-20 PROCEDURE — 71045 X-RAY EXAM CHEST 1 VIEW: CPT | Performed by: RADIOLOGY

## 2022-07-20 PROCEDURE — 70450 CT HEAD/BRAIN W/O DYE: CPT

## 2022-07-20 PROCEDURE — 81001 URINALYSIS AUTO W/SCOPE: CPT

## 2022-07-20 PROCEDURE — 71045 X-RAY EXAM CHEST 1 VIEW: CPT

## 2022-07-20 PROCEDURE — 80053 COMPREHEN METABOLIC PANEL: CPT

## 2022-07-20 PROCEDURE — G0378 HOSPITAL OBSERVATION PER HR: HCPCS

## 2022-07-20 PROCEDURE — 93005 ELECTROCARDIOGRAM TRACING: CPT | Performed by: PEDIATRICS

## 2022-07-20 PROCEDURE — 96372 THER/PROPH/DIAG INJ SC/IM: CPT

## 2022-07-20 PROCEDURE — 83605 ASSAY OF LACTIC ACID: CPT

## 2022-07-20 PROCEDURE — 84443 ASSAY THYROID STIM HORMONE: CPT

## 2022-07-20 PROCEDURE — 82550 ASSAY OF CK (CPK): CPT

## 2022-07-20 PROCEDURE — 87635 SARS-COV-2 COVID-19 AMP PRB: CPT

## 2022-07-20 PROCEDURE — 82607 VITAMIN B-12: CPT

## 2022-07-20 PROCEDURE — 99285 EMERGENCY DEPT VISIT HI MDM: CPT

## 2022-07-20 PROCEDURE — 6360000002 HC RX W HCPCS: Performed by: NURSE PRACTITIONER

## 2022-07-20 PROCEDURE — 84484 ASSAY OF TROPONIN QUANT: CPT

## 2022-07-20 PROCEDURE — 2580000003 HC RX 258: Performed by: PEDIATRICS

## 2022-07-20 PROCEDURE — 70450 CT HEAD/BRAIN W/O DYE: CPT | Performed by: RADIOLOGY

## 2022-07-20 PROCEDURE — 82947 ASSAY GLUCOSE BLOOD QUANT: CPT

## 2022-07-20 PROCEDURE — 84145 PROCALCITONIN (PCT): CPT

## 2022-07-20 PROCEDURE — 85025 COMPLETE CBC W/AUTO DIFF WBC: CPT

## 2022-07-20 PROCEDURE — 36415 COLL VENOUS BLD VENIPUNCTURE: CPT

## 2022-07-20 PROCEDURE — 86140 C-REACTIVE PROTEIN: CPT

## 2022-07-20 PROCEDURE — 2580000003 HC RX 258: Performed by: NURSE PRACTITIONER

## 2022-07-20 RX ORDER — SODIUM CHLORIDE 0.9 % (FLUSH) 0.9 %
5-40 SYRINGE (ML) INJECTION EVERY 12 HOURS SCHEDULED
Status: DISCONTINUED | OUTPATIENT
Start: 2022-07-20 | End: 2022-07-21 | Stop reason: HOSPADM

## 2022-07-20 RX ORDER — SODIUM CHLORIDE 9 MG/ML
INJECTION, SOLUTION INTRAVENOUS CONTINUOUS
Status: DISCONTINUED | OUTPATIENT
Start: 2022-07-20 | End: 2022-07-21 | Stop reason: HOSPADM

## 2022-07-20 RX ORDER — LOSARTAN POTASSIUM 25 MG/1
25 TABLET ORAL DAILY
COMMUNITY
End: 2022-10-13

## 2022-07-20 RX ORDER — SODIUM CHLORIDE 9 MG/ML
INJECTION, SOLUTION INTRAVENOUS PRN
Status: DISCONTINUED | OUTPATIENT
Start: 2022-07-20 | End: 2022-07-21 | Stop reason: HOSPADM

## 2022-07-20 RX ORDER — SODIUM CHLORIDE 0.9 % (FLUSH) 0.9 %
5-40 SYRINGE (ML) INJECTION PRN
Status: DISCONTINUED | OUTPATIENT
Start: 2022-07-20 | End: 2022-07-21 | Stop reason: HOSPADM

## 2022-07-20 RX ORDER — ENOXAPARIN SODIUM 100 MG/ML
40 INJECTION SUBCUTANEOUS EVERY 24 HOURS
Status: DISCONTINUED | OUTPATIENT
Start: 2022-07-20 | End: 2022-07-21 | Stop reason: HOSPADM

## 2022-07-20 RX ORDER — DEXTROSE MONOHYDRATE 100 MG/ML
INJECTION, SOLUTION INTRAVENOUS CONTINUOUS PRN
Status: DISCONTINUED | OUTPATIENT
Start: 2022-07-20 | End: 2022-07-21 | Stop reason: HOSPADM

## 2022-07-20 RX ORDER — 0.9 % SODIUM CHLORIDE 0.9 %
1000 INTRAVENOUS SOLUTION INTRAVENOUS ONCE
Status: COMPLETED | OUTPATIENT
Start: 2022-07-20 | End: 2022-07-20

## 2022-07-20 RX ORDER — ACETAMINOPHEN 325 MG/1
650 TABLET ORAL EVERY 6 HOURS PRN
Status: DISCONTINUED | OUTPATIENT
Start: 2022-07-20 | End: 2022-07-21 | Stop reason: HOSPADM

## 2022-07-20 RX ORDER — ASPIRIN 325 MG
325 TABLET ORAL DAILY
COMMUNITY

## 2022-07-20 RX ORDER — ATORVASTATIN CALCIUM 40 MG/1
40 TABLET, FILM COATED ORAL DAILY
COMMUNITY
End: 2022-10-13 | Stop reason: SDUPTHER

## 2022-07-20 RX ORDER — ONDANSETRON 2 MG/ML
4 INJECTION INTRAMUSCULAR; INTRAVENOUS EVERY 6 HOURS PRN
Status: DISCONTINUED | OUTPATIENT
Start: 2022-07-20 | End: 2022-07-21 | Stop reason: HOSPADM

## 2022-07-20 RX ORDER — POLYETHYLENE GLYCOL 3350 17 G/17G
17 POWDER, FOR SOLUTION ORAL DAILY PRN
Status: DISCONTINUED | OUTPATIENT
Start: 2022-07-20 | End: 2022-07-21 | Stop reason: HOSPADM

## 2022-07-20 RX ORDER — ONDANSETRON 4 MG/1
4 TABLET, ORALLY DISINTEGRATING ORAL EVERY 8 HOURS PRN
Status: DISCONTINUED | OUTPATIENT
Start: 2022-07-20 | End: 2022-07-21 | Stop reason: HOSPADM

## 2022-07-20 RX ORDER — GLYBURIDE 5 MG/1
15 TABLET ORAL
COMMUNITY
End: 2022-10-13

## 2022-07-20 RX ORDER — ACETAMINOPHEN 650 MG/1
650 SUPPOSITORY RECTAL EVERY 6 HOURS PRN
Status: DISCONTINUED | OUTPATIENT
Start: 2022-07-20 | End: 2022-07-21 | Stop reason: HOSPADM

## 2022-07-20 RX ADMIN — SODIUM CHLORIDE, PRESERVATIVE FREE 10 ML: 5 INJECTION INTRAVENOUS at 20:58

## 2022-07-20 RX ADMIN — ENOXAPARIN SODIUM 40 MG: 100 INJECTION SUBCUTANEOUS at 20:58

## 2022-07-20 RX ADMIN — SODIUM CHLORIDE: 9 INJECTION, SOLUTION INTRAVENOUS at 20:59

## 2022-07-20 RX ADMIN — SODIUM CHLORIDE 1000 ML: 9 INJECTION, SOLUTION INTRAVENOUS at 14:36

## 2022-07-20 ASSESSMENT — ENCOUNTER SYMPTOMS
ABDOMINAL PAIN: 0
BACK PAIN: 0
NAUSEA: 1
RHINORRHEA: 0
VOMITING: 0
SHORTNESS OF BREATH: 0
COLOR CHANGE: 0
EYE DISCHARGE: 0
COUGH: 0

## 2022-07-20 ASSESSMENT — PAIN - FUNCTIONAL ASSESSMENT
PAIN_FUNCTIONAL_ASSESSMENT: NONE - DENIES PAIN
PAIN_FUNCTIONAL_ASSESSMENT: NONE - DENIES PAIN

## 2022-07-20 NOTE — ED PROVIDER NOTES
[facility] EMERGENCY DEPT  eMERGENCY dEPARTMENT eNCOUnter      Pt Name: Rebecca Robledo  MRN: [id number]  [place] 1945  Date of evaluation: 7/20/2022  Provider: Estelita Eid MD    CHIEF COMPLAINT       Chief Complaint   Patient presents with    Hypoglycemia         HISTORY OF PRESENT ILLNESS   (Location/Symptom, Timing/Onset,Context/Setting, Quality, Duration, Modifying Factors, Severity)  Note limiting factors. Rebecca Robledo is a 67 y.o. male who presents to the emergency department with seizure activity. Patient arrives per EMS after episode of seizure activity. Patient states that \"my sugar dropped. \"  Patient took his metformin and glyburide at 5:00 this morning. 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 When patient arrived home around 10 AM, patient took a shower and accidentally took his metformin and glyburide doses again. Patient states \"I normally take a shower in the evening and automatically take my medications after my shower. \"  Patient and wife state that patient was at the kitchen table when he began feeling lightheaded. Patient's blood glucose was 36. Wife states that patient began to slowly tip over along with his chair. Wife broke his fall by easing him down onto her leg. Patient ended up prone on the floor. Wife states that patient's arms and legs were jerking violently. Patient was able to remain conscious and was speaking to her. Wife began to give patient honey and a candy bar. Patient states that he remembers speaking to her and eating the candy bar. HPI    NursingNotes were reviewed. REVIEW OF SYSTEMS    (2-9 systems for level 4, 10 or more for level 5)     Review of Systems   Constitutional:  Negative for chills and fever. HENT:  Negative for congestion and rhinorrhea. Eyes:  Negative for discharge. Respiratory:  Negative for cough and shortness of breath. Cardiovascular:  Negative for chest pain and palpitations. Gastrointestinal:  Positive for nausea. Negative for abdominal pain and vomiting. Genitourinary:  Negative for difficulty urinating and dysuria. Musculoskeletal:  Negative for back pain and neck pain. Skin:  Negative for color change and pallor. Neurological:  Positive for seizures, weakness and light-headedness. Negative for syncope. Psychiatric/Behavioral:  Negative for agitation and confusion. All other systems reviewed and are negative. PAST MEDICALHISTORY     Past Medical History:   Diagnosis Date    Hyperlipidemia     Hypertension          SURGICAL HISTORY     History reviewed. No pertinent surgical history. CURRENT MEDICATIONS     Previous Medications    ASPIRIN 325 MG TABLET    Take 325 mg by mouth in the morning. ATORVASTATIN (LIPITOR) 40 MG TABLET    Take 40 mg by mouth in the morning. GLYBURIDE (DIABETA) 5 MG TABLET    Take 15 mg by mouth daily (with breakfast)    LOSARTAN (COZAAR) 25 MG TABLET    Take 25 mg by mouth in the morning. METFORMIN (GLUCOPHAGE) 1000 MG TABLET    Take 1,000 mg by mouth in the morning and 1,000 mg in the evening. Take with meals. ALLERGIES     Patient has no known allergies. FAMILY HISTORY     History reviewed. No pertinent family history.        SOCIAL HISTORY       Social History     Socioeconomic History    Marital status: Single     Spouse name: None    Number of children: None    Years of education: None    Highest education level: None   Tobacco Use    Smoking status: Former     Types: Cigarettes    Smokeless tobacco: Never   Vaping Use    Vaping Use: Never used   Substance and Sexual Activity    Alcohol use: Not Currently    Drug use: Never       SCREENINGS    Jason Coma Scale  Eye Opening: Spontaneous  Best Verbal Response: Oriented  Best Motor Response: Obeys commands  Jason Coma Scale Score: 15        PHYSICAL EXAM    (up to 7 for level 4, 8 or more for level 5)     ED Triage Vitals [07/20/22 1406]   BP Temp Temp Source Heart Rate Resp SpO2 Height Weight   134/66 97.7 °F (36.5 °C) Oral 73 16 94 % 5' 6\" (1.676 m) 185 lb (83.9 kg)       Physical Exam  Vitals and nursing note reviewed. Constitutional:       General: He is not in acute distress. Appearance: Normal appearance. HENT:      Head: Normocephalic. Comments: Bruising superior and lateral to right orbit. No palpable deformity. Patient denies tenderness to palpation. Right Ear: External ear normal.      Left Ear: External ear normal.      Nose: Nose normal.      Mouth/Throat:      Mouth: Mucous membranes are moist.      Pharynx: Oropharynx is clear. No oropharyngeal exudate. Eyes:      General: No scleral icterus. Conjunctiva/sclera: Conjunctivae normal.      Pupils: Pupils are equal, round, and reactive to light. Cardiovascular:      Rate and Rhythm: Normal rate and regular rhythm. Pulses: Normal pulses. Heart sounds: Normal heart sounds. Pulmonary:      Effort: Pulmonary effort is normal.      Breath sounds: Normal breath sounds. Abdominal:      General: Bowel sounds are normal.      Palpations: Abdomen is soft. Tenderness: There is no abdominal tenderness. There is no guarding. Musculoskeletal:         General: No tenderness or deformity. Cervical back: Neck supple. No rigidity. Right lower leg: No edema. Left lower leg: No edema. Skin:     General: Skin is warm and dry. Capillary Refill: Capillary refill takes less than 2 seconds. Coloration: Skin is not jaundiced. Neurological:      General: No focal deficit present. Mental Status: He is alert and oriented to person, place, and time. Mental status is at baseline. Cranial Nerves: No cranial nerve deficit. Sensory: No sensory deficit. Motor: No weakness.       Coordination: Coordination normal.   Psychiatric:         Mood and Affect: Mood normal.         Behavior: Behavior normal.       DIAGNOSTIC RESULTS     EKG: All EKG's areinterpreted by the Emergency Department Physician who either signs or Co-signs this chart in the absence of a cardiologist.    EKG dated 7/20/2022 at 1430 7 PM: Normal sinus rhythm, rate 69. Possible left atrial enlargement. Left axis deviation. RSR prime in V1. , , QTc 462. RADIOLOGY:  Non-plain film images such as CT, Ultrasound and MRI are read by the radiologist. Plain radiographic images are visualized and preliminarily interpreted bythe emergency physician with the below findings:          CT Head WO Contrast   Final Result   1. Chronic microvascular ischemic changes with age-appropriate parenchymal volume loss. 2. No evidence of acute cortical infarction or intracranial hemorrhage. 3. Mild maxillary sinus inflammatory changes. Recommendation: Follow up as clinically indicated. All CT scans at this facility utilize dose modulation, iterative reconstruction, and/or weight based dosing when appropriate to reduce radiation dose to as low as reasonably achievable.    Electronically Signed by Nasir Silverio MD at 20-Jul-2022 04:34:59 PM                       LABS:  Labs Reviewed   CBC WITH AUTO DIFFERENTIAL - Abnormal; Notable for the following components:       Result Value    RBC 4.58 (*)     Hemoglobin 13.5 (*)     MCHC 31.9 (*)     RDW 14.9 (*)     Monocytes % 12.3 (*)     All other components within normal limits   COMPREHENSIVE METABOLIC PANEL W/ REFLEX TO MG FOR LOW K - Abnormal; Notable for the following components:    CO2 21 (*)     Glucose 71 (*)     BUN 27 (*)     All other components within normal limits   URINALYSIS WITH REFLEX TO CULTURE - Abnormal; Notable for the following components:    Ketones, Urine TRACE (*)     Protein, UA 30 (*)     All other components within normal limits   LACTIC ACID - Abnormal; Notable for the following components:    Lactic Acid 4.2 (*)     All other components within normal limits    Narrative:     CALL  [facility] tel. ,  Chemistry results called to and read back by Jose Angel Jefferson 07/20/2022 15:01,  by NIGHAT   MICROSCOPIC URINALYSIS - Abnormal; Notable for the following components:    Bacteria, UA NEGATIVE (*)     Crystals, UA NEG (*)     All other components within normal limits   POCT GLUCOSE - Abnormal; Notable for the following components:    POC Glucose 118 (*)     All other components within normal limits   POCT GLUCOSE - Normal   CULTURE, BLOOD 1   CULTURE, BLOOD 2   TROPONIN   CK   LACTIC ACID   POCT GLUCOSE       All other labs were within normal range or not returned as of this dictation. EMERGENCY DEPARTMENT COURSE and DIFFERENTIAL DIAGNOSIS/MDM:   Vitals:    Vitals:    07/20/22 1702 07/20/22 1732 07/20/22 1734 07/20/22 1802   BP: 138/67 133/72  131/65   Pulse: 66   65   Resp:       Temp:       TempSrc:       SpO2: 96%  100% 97%   Weight:       Height:           MDM     Amount and/or Complexity of Data Reviewed  Clinical lab tests: reviewed  Tests in the radiology section of CPT®: reviewed    79-year-old male presents secondary to seizure like activity. Patient was hypoglycemic and began having jerking. Patient was conscious and able to speak and eat during episode of \"jerking. \"  Lab, EKG, and radiology results reviewed. Discussed with Dr. Ismael Escalante, neurologist.  It is Dr. Ismael Escalante opinion that patient had effect of hypoglycemia that was not a true seizure. Patient has taken 2 doses of  glyburide and metformin this morning. Discussed with Dr. Anthony Bernardo, hospitalist, due to the long action of glyburide, patient will be admitted for observation and glucose testing overnight. CONSULTS:  None    PROCEDURES:  Unless otherwise noted below, none     Procedures    FINAL IMPRESSION      1. Hyperglycemia    2. Elevated lactic acid level    3.  Transient alteration of awareness          DISPOSITION/PLAN   DISPOSITION Decision To Admit 07/20/2022 06:37:20 PM               (Please note that portions of this note were completed with a voice recognition program.  Efforts were made to edit thedictations but occasionally words are mis-transcribed.)    Halina Saldana MD (electronically signed)  Attending Emergency Physician          Halina Saldana MD  07/20/22 2041

## 2022-07-21 VITALS
WEIGHT: 185 LBS | RESPIRATION RATE: 18 BRPM | BODY MASS INDEX: 29.73 KG/M2 | TEMPERATURE: 97 F | OXYGEN SATURATION: 92 % | DIASTOLIC BLOOD PRESSURE: 68 MMHG | HEART RATE: 54 BPM | SYSTOLIC BLOOD PRESSURE: 151 MMHG | HEIGHT: 66 IN

## 2022-07-21 LAB
ANION GAP SERPL CALCULATED.3IONS-SCNC: 9 MMOL/L (ref 7–19)
BASOPHILS ABSOLUTE: 0 K/UL (ref 0–0.2)
BASOPHILS RELATIVE PERCENT: 0.6 % (ref 0–1)
BUN BLDV-MCNC: 22 MG/DL (ref 8–23)
CALCIUM SERPL-MCNC: 8.8 MG/DL (ref 8.8–10.2)
CHLORIDE BLD-SCNC: 107 MMOL/L (ref 98–111)
CO2: 21 MMOL/L (ref 22–29)
CREAT SERPL-MCNC: 0.8 MG/DL (ref 0.5–1.2)
EKG P AXIS: 46 DEGREES
EKG P-R INTERVAL: 172 MS
EKG Q-T INTERVAL: 430 MS
EKG QRS DURATION: 96 MS
EKG QTC CALCULATION (BAZETT): 445 MS
EKG T AXIS: 60 DEGREES
EOSINOPHILS ABSOLUTE: 0.1 K/UL (ref 0–0.6)
EOSINOPHILS RELATIVE PERCENT: 1.7 % (ref 0–5)
GFR AFRICAN AMERICAN: >59
GFR NON-AFRICAN AMERICAN: >60
GLUCOSE BLD-MCNC: 123 MG/DL (ref 70–99)
GLUCOSE BLD-MCNC: 225 MG/DL (ref 74–109)
GLUCOSE BLD-MCNC: 346 MG/DL (ref 70–99)
HCT VFR BLD CALC: 36.4 % (ref 42–52)
HEMOGLOBIN: 11.9 G/DL (ref 14–18)
IMMATURE GRANULOCYTES #: 0 K/UL
LACTIC ACID: 1.1 MMOL/L (ref 0.5–1.9)
LYMPHOCYTES ABSOLUTE: 1.4 K/UL (ref 1.1–4.5)
LYMPHOCYTES RELATIVE PERCENT: 25.2 % (ref 20–40)
MCH RBC QN AUTO: 29.8 PG (ref 27–31)
MCHC RBC AUTO-ENTMCNC: 32.7 G/DL (ref 33–37)
MCV RBC AUTO: 91 FL (ref 80–94)
MONOCYTES ABSOLUTE: 0.7 K/UL (ref 0–0.9)
MONOCYTES RELATIVE PERCENT: 13 % (ref 0–10)
NEUTROPHILS ABSOLUTE: 3.2 K/UL (ref 1.5–7.5)
NEUTROPHILS RELATIVE PERCENT: 59.3 % (ref 50–65)
PDW BLD-RTO: 14.6 % (ref 11.5–14.5)
PERFORMED ON: ABNORMAL
PERFORMED ON: ABNORMAL
PLATELET # BLD: 157 K/UL (ref 130–400)
PMV BLD AUTO: 10.9 FL (ref 9.4–12.4)
POTASSIUM REFLEX MAGNESIUM: 4.2 MMOL/L (ref 3.5–5)
RBC # BLD: 4 M/UL (ref 4.7–6.1)
SODIUM BLD-SCNC: 137 MMOL/L (ref 136–145)
WBC # BLD: 5.4 K/UL (ref 4.8–10.8)

## 2022-07-21 PROCEDURE — 6370000000 HC RX 637 (ALT 250 FOR IP): Performed by: NURSE PRACTITIONER

## 2022-07-21 PROCEDURE — 82947 ASSAY GLUCOSE BLOOD QUANT: CPT

## 2022-07-21 PROCEDURE — 85025 COMPLETE CBC W/AUTO DIFF WBC: CPT

## 2022-07-21 PROCEDURE — 83605 ASSAY OF LACTIC ACID: CPT

## 2022-07-21 PROCEDURE — 80048 BASIC METABOLIC PNL TOTAL CA: CPT

## 2022-07-21 PROCEDURE — 36415 COLL VENOUS BLD VENIPUNCTURE: CPT

## 2022-07-21 PROCEDURE — G0378 HOSPITAL OBSERVATION PER HR: HCPCS

## 2022-07-21 PROCEDURE — 93010 ELECTROCARDIOGRAM REPORT: CPT | Performed by: INTERNAL MEDICINE

## 2022-07-21 PROCEDURE — 99222 1ST HOSP IP/OBS MODERATE 55: CPT | Performed by: PSYCHIATRY & NEUROLOGY

## 2022-07-21 RX ORDER — ASPIRIN 325 MG
325 TABLET ORAL DAILY
Status: DISCONTINUED | OUTPATIENT
Start: 2022-07-21 | End: 2022-07-21 | Stop reason: HOSPADM

## 2022-07-21 RX ORDER — ATORVASTATIN CALCIUM 40 MG/1
40 TABLET, FILM COATED ORAL DAILY
Status: DISCONTINUED | OUTPATIENT
Start: 2022-07-21 | End: 2022-07-21 | Stop reason: HOSPADM

## 2022-07-21 RX ORDER — LOSARTAN POTASSIUM 50 MG/1
25 TABLET ORAL DAILY
Status: DISCONTINUED | OUTPATIENT
Start: 2022-07-21 | End: 2022-07-21 | Stop reason: HOSPADM

## 2022-07-21 RX ADMIN — ASPIRIN 325 MG: 325 TABLET ORAL at 07:41

## 2022-07-21 RX ADMIN — ATORVASTATIN CALCIUM 40 MG: 40 TABLET, FILM COATED ORAL at 07:41

## 2022-07-21 RX ADMIN — LOSARTAN POTASSIUM 25 MG: 50 TABLET, FILM COATED ORAL at 07:41

## 2022-07-21 NOTE — PROGRESS NOTES
4 Eyes Skin Assessment    Michelle Morfin is being assessed upon: Admission    I agree that I, María Nails RN, along with Evelin Bell RN (either 2 RN's or 1 LPN and 1 RN) have performed a thorough Head to Toe Skin Assessment on the patient. ALL assessment sites listed below have been assessed. Areas assessed by both nurses:     [x]   Head, Face, and Ears   [x]   Shoulders, Back, and Chest  [x]   Arms, Elbows, and Hands   [x]   Coccyx, Sacrum, and Ischium  [x]   Legs, Feet, and Heels    Does the Patient have Skin Breakdown? No    Domingo Prevention initiated: No  Wound Care Orders initiated: No    WOC nurse consulted for Pressure Injury (Stage 3,4, Unstageable, DTI, NWPT, and Complex wounds) and New or Established Ostomies: No        Primary Nurse eSignature:  María Nails RN on 7/20/2022 at 9:44 PM      Co-Signer eSignature: Electronically signed by Kristen Hill RN on 7/20/22 at 10:10 PM CDT

## 2022-07-21 NOTE — H&P
[id number] [facility]      [facility]ist - History & Physical      PCP: Shawn Yeh MD    Date of Admission: 7/20/2022    Date of Service: 7/20/2022    Chief Complaint:  Low blood sugar    History Of Present Illness: The patient is a 67 y.o. male who presented to [facility] ED complaining of low blood sugar and seizure like activity. Pt has history of diabetes, HTN and hyperlipidemia. He tells me that he accidentally took his night doses of metformin and glyburide 5 hours after his am dose. He describes hypoglycemic event developing sweats, tremor and confusion this morning felt faint and placed himself on floor of his kitchen. He describes \"violent\" shaking of all four extremities. He denies loss of consciousness. He was able to hear his wife attempt to give his candy bar and some honey. He tells me that his blood sugar at the time was in the 30's. He denies fevers as well as recent illness. In ED, patient was able to eat food and his blood sugar was 118, initial lactic acid was 4.2 with this repeated at 2.4 following iv fluids. CT of head no acute changes, sodium 141, potassium 3.9, creatinine 0.9/BUN 27, glucose 71, LFTs normal, ck 236, wbc 7k, hgb 13.5, platelets 291N. Pt is admitted to hospitalist service for further evaluation and treatment. Past Medical History:        Diagnosis Date    Hyperlipidemia     Hypertension        Past Surgical History:    History reviewed. No pertinent surgical history. Home Medications:  Prior to Admission medications    Medication Sig Start Date End Date Taking? Authorizing Provider   metFORMIN (GLUCOPHAGE) 1000 MG tablet Take 1,000 mg by mouth in the morning and 1,000 mg in the evening. Take with meals. Yes Historical Provider, MD   glyBURIDE (DIABETA) 5 MG tablet Take 15 mg by mouth daily (with breakfast)   Yes Historical Provider, MD   atorvastatin (LIPITOR) 40 MG tablet Take 40 mg by mouth in the morning.    Yes Historical Provider, MD   losartan (COZAAR) 25 MG tablet Take 25 mg by mouth in the morning. Yes Historical Provider, MD   aspirin 325 MG tablet Take 325 mg by mouth in the morning. Yes Historical Provider, MD       Allergies:    Patient has no known allergies. Social History:    The patient currently lives wife  Tobacco:   reports that he has quit smoking. His smoking use included cigarettes. He has never used smokeless tobacco.  Alcohol:   reports that he does not currently use alcohol. Illicit Drugs: denies    Family History:  History reviewed. No pertinent family history. Review of Systems:   Review of Systems   Constitutional:  Positive for diaphoresis. Respiratory:  Negative for shortness of breath. Cardiovascular:  Negative for chest pain. Gastrointestinal:  Negative for abdominal pain and vomiting. Neurological:  Positive for dizziness, tremors, syncope, weakness and light-headedness. All other systems reviewed and are negative. 14 point review of systems is negative except as specifically addressed above. Physical Examination:  BP (!) 143/68   Pulse 67   Temp 97.7 °F (36.5 °C) (Oral)   Resp 16   Ht 5' 6\" (1.676 m)   Wt 185 lb (83.9 kg)   SpO2 93%   BMI 29.86 kg/m²   Physical Exam  Vitals reviewed. Constitutional:       Appearance: He is not toxic-appearing. HENT:      Head: Atraumatic. Right Ear: External ear normal.      Left Ear: External ear normal.      Mouth/Throat:      Pharynx: Oropharynx is clear. Eyes:      Conjunctiva/sclera: Conjunctivae normal.   Cardiovascular:      Rate and Rhythm: Normal rate and regular rhythm. Pulmonary:      Breath sounds: Normal breath sounds. Abdominal:      General: Abdomen is flat. Tenderness: There is no abdominal tenderness. Musculoskeletal:         General: Normal range of motion. Cervical back: Neck supple. Skin:     General: Skin is warm and dry. Neurological:      General: No focal deficit present.       Mental Status: He is alert and oriented to person, place, and time. Diagnostic Data:  CBC:  Recent Labs     07/20/22  1425   WBC 7.2   HGB 13.5*   HCT 42.3        BMP:  Recent Labs     07/20/22  1425      K 3.9      CO2 21*   BUN 27*   CREATININE 0.9   CALCIUM 9.9     Recent Labs     07/20/22  1425   AST 34   ALT 39   BILITOT 0.3   ALKPHOS 75     Coag Panel:   Cardiac Enzymes:   Recent Labs     07/20/22  1425   CKTOTAL 236   TROPONINI <0.01       Urinalysis:  Lab Results   Component Value Date/Time    NITRU Negative 07/20/2022 05:02 PM    WBCUA 1 07/20/2022 05:02 PM    BACTERIA NEGATIVE 07/20/2022 05:02 PM    RBCUA 2 07/20/2022 05:02 PM    BLOODU Negative 07/20/2022 05:02 PM    SPECGRAV 1.028 07/20/2022 05:02 PM    GLUCOSEU Negative 07/20/2022 05:02 PM       CT Head WO Contrast    Result Date: 7/20/2022  Exam: CT OF THE BRAIN WITHOUT CONTRAST Clinical data: First seizure, hit head. Technique: Contiguous axial images are obtained from the skull base to vertex without intravenous contrast. Reformatted/MPR images were performed. Radiation dose: CTDIvol =41.87 mGy, DLP =760.14 mGy x cm. Prior studies: No prior studies submitted. Findings:  Patchy hypoattenuation in the periventricular white matter compatible with chronic microvascular ischemic changes. No evidence of acute cortical infarction, hemorrhage, mass or mass effect. The ventricles and sulci are slightly prominent but are symmetric. No hydrocephalus or abnormal extra-axial fluid collections are present. The posterior fossa is unremarkable. The skull base and calvarium are intact. Mild maxillary sinus inflammatory changes; the included portions of the paranasal sinuses and mastoid air cells are otherwise grossly clear. 1. Chronic microvascular ischemic changes with age-appropriate parenchymal volume loss. 2. No evidence of acute cortical infarction or intracranial hemorrhage. 3. Mild maxillary sinus inflammatory changes.  Recommendation: Follow up as

## 2022-07-21 NOTE — PROGRESS NOTES
Shelton Benitez arrived to room # 325. Presented with: hypoglycemia  Mental Status: Patient is oriented, alert, coherent, logical, thought processes intact, and able to concentrate and follow conversation. Vitals:    07/20/22 2041   BP: (!) 153/67   Pulse: 58   Resp: 18   Temp: 97.5 °F (36.4 °C)   SpO2: 98%     Patient safety contract and falls prevention contract reviewed with patient Yes. Oriented Patient to room. Call light within reach. Yes.   Needs, issues or concerns expressed at this time: no.      Electronically signed by Adeline Campbell RN on 7/20/2022 at 9:43 PM

## 2022-07-21 NOTE — CONSULTS
[facility] Neurology Consult  ? ? Patient: Sruthi Castrejon  MR#: [id number]  Account Number: [de-identified]   Room: 18/36-46   YOB: 1945  Date of Progress Note: 7/21/2022  Time of Note 7:52 AM  Attending Physician: Tacho Mata MD  Consulting Physician: Doni Meredith M.D.  ?  ? CHIEF COMPLAINT: Involuntary jerking of his arms and legs  ? HISTORY OF PRESENT ILLNESS:   This 67 y.o. male who presents with hypoglycemia. He is a known diabetic and accidentally took his oral hypoglycemics earlier than usual which caused a blood sugar down in the 30s. It caused him to be very tremulous in his arms and legs estimated to have been about 2 minutes. He was able to speak during this and actually told his wife to call 911. No tongue biting or incontinence noted. Case discussed with ED physician. Patient denies a prior history of seizure nor any symptoms suggestive of seizure. CT of the head showing nothing acute. Records reviewed in detail. REVIEW OF SYSTEMS:  Constitutional - No fever or chills. No diaphoresis or significant fatigue. HENT - No tinnitus or significant hearing loss. Eyes - no sudden vision change or eye pain  Respiratory - no significant shortness of breath or cough  Cardiovascular - no chest pain No palpitations or significant leg swelling  Gastrointestinal - no abdominal swelling or pain. Genitourinary - No difficulty urinating, dysuria  Musculoskeletal - no back pain or myalgia. Skin - no color change or rash  Neurologic - No seizures. No lateralizing weakness. Hematologic - no easy bruising or excessive bleeding. Psychiatric - no severe anxiety or nervousness. All other review of systems are negative. ? Past Medical History:      Diagnosis Date    Hyperlipidemia     Hypertension      Past Surgical History:  History reviewed. No pertinent surgical history.   Medications in Hospital:     Current Facility-Administered Medications:     aspirin tablet 325 mg, 325 mg, Oral, Daily, CARITO Baumann CNP, 325 mg at 07/21/22 0741    atorvastatin (LIPITOR) tablet 40 mg, 40 mg, Oral, Daily, CARITO Baumann CNP, 40 mg at 07/21/22 0741    losartan (COZAAR) tablet 25 mg, 25 mg, Oral, Daily, CARITO Baumann CNP, 25 mg at 07/21/22 0741    sodium chloride flush 0.9 % injection 5-40 mL, 5-40 mL, IntraVENous, 2 times per day, CARITO Baumann CNP, 10 mL at 07/20/22 2058    sodium chloride flush 0.9 % injection 5-40 mL, 5-40 mL, IntraVENous, PRN, CARITO Baumann CNP    0.9 % sodium chloride infusion, , IntraVENous, PRN, CARITO Baumann CNP    enoxaparin (LOVENOX) injection 40 mg, 40 mg, SubCUTAneous, Q24H, CARITO Baumann CNP, 40 mg at 07/20/22 2058    ondansetron (ZOFRAN-ODT) disintegrating tablet 4 mg, 4 mg, Oral, Q8H PRN **OR** ondansetron (ZOFRAN) injection 4 mg, 4 mg, IntraVENous, Q6H PRN, CARITO Baumann CNP    polyethylene glycol (GLYCOLAX) packet 17 g, 17 g, Oral, Daily PRN, CARITO Baumann CNP    acetaminophen (TYLENOL) tablet 650 mg, 650 mg, Oral, Q6H PRN **OR** acetaminophen (TYLENOL) suppository 650 mg, 650 mg, Rectal, Q6H PRN, CARITO Baumann CNP    0.9 % sodium chloride infusion, , IntraVENous, Continuous, CARITO Baumann CNP, Last Rate: 100 mL/hr at 07/20/22 2059, New Bag at 07/20/22 2059    glucose chewable tablet 16 g, 4 tablet, Oral, PRN, CARITO Baumann CNP    dextrose bolus 10% 125 mL, 125 mL, IntraVENous, PRN **OR** dextrose bolus 10% 250 mL, 250 mL, IntraVENous, PRN, CARITO Baumann CNP    glucagon (rDNA) injection 1 mg, 1 mg, SubCUTAneous, PRN, CARITO Baumann CNP    dextrose 10 % infusion, , IntraVENous, Continuous PRN, CARITO Baumann CNP  Allergies: Patient has no known allergies. Social History:   TOBACCO:  reports that he has quit smoking. His smoking use included cigarettes. He has never used smokeless tobacco.  ETOH:  reports that he does not currently use alcohol.   Family History:   History reviewed. No pertinent family history. ?  ?  Physical Exam:    Vitals: BP (!) 151/68   Pulse 54   Temp 97 °F (36.1 °C)   Resp 18   Ht 5' 6\" (1.676 m)   Wt 185 lb (83.9 kg)   SpO2 92%   BMI 29.86 kg/m²   Constitutional - well developed, well nourished. Eyes - conjunctiva normal. Pupils react to light  Ear, nose, throat -hearing intact to finger rub No scars, masses, or lesions over external nose or ears, no atrophy of tongue  Neck-symmetric, no masses noted, no jugular vein distension  Respiration- chest wall appears symmetric, good expansion,   normal effort without use of accessory muscles  Musculoskeletal - no significant wasting of muscles noted, no bony deformities  Extremities-no clubbing, cyanosis or edema  Skin - warm, dry, and intact. No rash, erythema, or pallor. Psychiatric - mood, affect, and behavior appear normal.   Neurological exam  Awake, alert, fluent oriented x 3 appropriate affect  Attention and concentration appear appropriate  Recent and remote memory appears unremarkable  Speech normal without dysarthria  No clear issues with language of fund of knowledge  Cranial Nerve Exam   CN II- Visual fields grossly unremarkable  CN III, IV,VI-EOMI, No nystagmus, conjugate eye movements, no ptosis  CN V-sensation intact to LT over face  CN VII-no facial assymetry  CN VIII-Hearing intact to voice  CN IX and X- Palate elevates in midline  CN XI-good shoulder shrug  CN XII-Tongue midline with no fasciculations or fibrillations  Motor Exam  V/V throughout upper and lower extremities bilaterally, no cogwheeling, normal tone  Sensory Exam  Sensation intact to light touch and temperature upper and lower extremities bilaterally  Reflexes 2+ at the knees and otherwise absent  Tremors- no tremors in hands or head noted  Gait  Not tested  Coordination  Finger to nose-unremarkable  ?   ?  CBC:   Recent Labs     07/20/22  1425 07/21/22  0322   WBC 7.2 5.4   HGB 13.5* 11.9*    157     BMP:   Recent Labs     07/20/22  1425 07/20/22  1700 07/21/22  0322     --  137   K 3.9  --  4.2     --  107   CO2 21*  --  21*   BUN 27*  --  22   CREATININE 0.9  --  0.8   GLUCOSE 71* 118 225*     Hepatic:   Recent Labs     07/20/22  1425   AST 34   ALT 39   BILITOT 0.3   ALKPHOS 75     Lipids: No results for input(s): CHOL, HDL in the last 72 hours. Invalid input(s): LDLCALCU  INR: No results for input(s): INR in the last 72 hours. ?  ?  Assessment and Plan   1.  2-minute episode of uncontrolled shaking of the arms and legs associated with a blood sugar of 36. Patient with no confusion nor any focal signs or symptoms suggestive of stroke or seizure. Symptoms were associated with hypoglycemia. No neurological work-up felt necessary. Call if symptoms return or as needed.   ?  ?      Rebecca Dickerson MD  Board Certified in Neurology

## 2022-07-21 NOTE — CARE COORDINATION
Pt states, he independent at baseline w/ADL's/IADL's, driving. Pt states, he has seen a dietician in the past regarding DM. Pt states, this is the first time he has had trouble with his medications. Pt states, he is Provider q 3 months. Pt has a ride home today   07/21/22 1057   Service Assessment   Patient Orientation Alert and Oriented   Cognition Alert   History Provided By Significant Other   Primary Caregiver Self   Support Systems Spouse/Significant Other   PCP Verified by CM Yes  (pt reports seeing his PCP q 3 months)   Prior Functional Level Independent in ADLs/IADLs   Current Functional Level Independent in ADLs/IADLs   Can patient return to prior living arrangement Yes   Ability to make needs known: Good   Family able to assist with home care needs: Yes   Would you like for me to discuss the discharge plan with any other family members/significant others, and if so, who? No   Financial Resources Medicaid; Medicare   Community Resources   (none needed)   Social/Functional History   Lives With Significant other   Type of [address] One level   [address] to enter with rails   Bathroom Shower/Tub Tub/Shower unit   Bathroom Toilet Standard   Bathroom Accessibility Accessible   Receives Help From   (S/O)   ADL Assistance Independent   Homemaking Assistance Independent   Ambulation Assistance Independent   Transfer Assistance Independent   Active  Yes   Mode of Transportation Car   Occupation Retired   Discharge Planning   Living Arrangements Spouse/Significant Other   Current Services Prior To Admission None   Potential Assistance Needed N/A   DME Ordered? No   Potential Assistance Purchasing Medications No   History of falls?  0   Services At/After Discharge   Transition of Care Consult (CM Consult)   (none needed)   Services At/After Discharge None   Mode of Transport at Discharge Self   Confirm Follow Up Transport   (s/o)   PCP Laurie Sharma  Patient Contact Information:    [phone] hospitals  [phone] (home)   Telephone Information:   Mobile [phone]     Above information verified? [x]   Yes  []   No      Emergency Contacts:    Extended Emergency Contact Information  Primary Emergency Contact: [address] [place] [address]  Mobile Phone: [phone]  Relation: Other  Preferred language: English   needed? No      Have you been vaccinated for COVID-19 (SARS-CoV-2)? []   Yes  [x]   No                   If so, when?     Which :         []   Pfizer-BioNTech  []   Moderna  []   [place] Rota  []   Other:         Pharmacy:    [address] [place] [address], [facility]. [place] [address] [phone] Christiano Moralesg [phone]  [facility] [address] [id number]  Phone: [phone] Fax: [phone]      Patient Deficits:    [x]   Yes   []   No

## 2022-07-21 NOTE — DISCHARGE SUMMARY
[place] [address]    DEPARTMENT OF HOSPITALIST MEDICINE      DISCHARGE SUMMARY:      PATIENT NAME:  Licha Adams  :  1945  MRN:  [id number]    Admission Date:   2022  2:01 PM Attending: Sukhjinder Sharp MD   Discharge Date:   2022              PCP: Deb Vega MD  Length of Stay: 1 days     Chief Complaint on Admission:   Chief Complaint   Patient presents with    Hypoglycemia       Consultants:     IP CONSULT TO NEUROLOGY  IP CONSULT TO [place]       Discharge Problem List:   Principal Problem:    Hypoglycemia  Active Problems:    Seizure-like activity ([address]    Elevated lactic acid level  Resolved Problems:    * No resolved hospital problems. *         Last dated Assessment and Plan . ..2022      CUMULATIVE  HOSPITAL  COURSE  AND  TREATMENT:  The patient is a 67 y.o. male who presented to [facility] ED complaining of low blood sugar and seizure like activity. Pt has history of diabetes, HTN and hyperlipidemia. He tells me that he accidentally took his night doses of metformin and glyburide 5 hours after his am dose. He describes hypoglycemic event developing sweats, tremor and confusion this morning felt faint and placed himself on floor of his kitchen. He describes \"violent\" shaking of all four extremities. He denies loss of consciousness. He was able to hear his wife attempt to give his candy bar and some honey. He tells me that his blood sugar at the time was in the 30's. He denies fevers as well as recent illness. In ED, patient was able to eat food and his blood sugar was 118, initial lactic acid was 4.2 with this repeated at 2.4 following iv fluids. CT of head no acute changes, sodium 141, potassium 3.9, creatinine 0.9/BUN 27, glucose 71, LFTs normal, ck 236, wbc 7k, hgb 13.5, platelets 826G. Pt is admitted to hospitalist service for further evaluation and treatment. Pt blood glucose normalized.   His lactic is back to nornal limits. Neurology has seem and no new treatments. He has ecchymosis around the right eye. No complaint of pain or visual problems. He will go home with family. Moniter blood sugar tid for next few days the resume regular schedule. Dispense meds out in daily pill containers to avoid repeat dosing in the future. He will follow up with PCP    OBJECTIVE:  BP (!) 151/68   Pulse 54   Temp 97 °F (36.1 °C)   Resp 18   Ht 5' 6\" (1.676 m)   Wt 185 lb (83.9 kg)   SpO2 92%   BMI 29.86 kg/m²       Heart: RRR   Lungs: Bilateral fair air entry   Abdomen: Soft, non-tender   Extremities: No edema   Neurologic: Alert and oriented   Skin: Warm and dry          Laboratory Data:  Recent Labs     07/20/22  1425 07/21/22  0322   WBC 7.2 5.4   HGB 13.5* 11.9*    157     Recent Labs     07/20/22  1425 07/20/22  1700 07/21/22  0322     --  137   K 3.9  --  4.2     --  107   CO2 21*  --  21*   BUN 27*  --  22   CREATININE 0.9  --  0.8   GLUCOSE 71* 118 225*     Recent Labs     07/20/22  1425   AST 34   ALT 39   BILITOT 0.3   ALKPHOS 75     Troponin T:   Recent Labs     07/20/22  1425   TROPONINI <0.01     Pro-BNP: No results for input(s): BNP in the last 72 hours. INR: No results for input(s): INR in the last 72 hours. UA:  Recent Labs     07/20/22  1702   COLORU YELLOW   PHUR 5.0   WBCUA 1   RBCUA 2   BACTERIA NEGATIVE*   CLARITYU Clear   SPECGRAV 1.028   LEUKOCYTESUR Negative   UROBILINOGEN 1.0   BILIRUBINUR Negative   BLOODU Negative   GLUCOSEU Negative     A1C: No results for input(s): LABA1C in the last 72 hours. ABG:No results for input(s): PHART, ZJA7TMJ, PO2ART, XLL8JUV, BEART, HGBAE, P9PDVBQI, CARBOXHGBART in the last 72 hours. Impressions of imaging performed in 48 hours before discharge:    CT Head WO Contrast    Result Date: 7/20/2022  1. Chronic microvascular ischemic changes with age-appropriate parenchymal volume loss.  2. No evidence of acute cortical infarction or intracranial hemorrhage. 3. Mild maxillary sinus inflammatory changes. Recommendation: Follow up as clinically indicated. All CT scans at this facility utilize dose modulation, iterative reconstruction, and/or weight based dosing when appropriate to reduce radiation dose to as low as reasonably achievable. Electronically Signed by Tawanda Curry MD at 20-Jul-2022 04:34:59 PM             XR CHEST PORTABLE    Result Date: 7/20/2022  Median sternotomy wires. No acute cardiopulmonary process. Recommendation: Follow up as clinically indicated. Electronically Signed by Alison Lew MD at 20-Jul-2022 08:58:41 PM                   Medication List        CONTINUE taking these medications      aspirin 325 MG tablet     atorvastatin 40 MG tablet  Commonly known as: LIPITOR     glyBURIDE 5 MG tablet  Commonly known as: DIABETA     losartan 25 MG tablet  Commonly known as: COZAAR     metFORMIN 1000 MG tablet  Commonly known as: GLUCOPHAGE                ISSUES TO BE ADDRESSED AT HOSPITAL FOLLOW-UP VISIT:    Advised patient to follow-up closely with PCP upon discharge for management of chronic medical issues  Please see the detailed discharge directions delivered from earlier today! Condition on Discharge: stable  Discharge Disposition: Home    Recommended Follow Up:  Carlos Arcos MD  [address] [place] [address]   [facility] [facility] [id number] [phone]    Follow up in 1 week(s)      Followup Appointments Scheduled at Time of Discharge:  No future appointments. Discharge Instructions:   Please see the discharge paperwork. Patient was seen at bedside today, and the examination shows improvement since yesterday. Detailed discharge directions delivered to the patient by myself and our nursing staff, who verbalizes understanding and is very happy and satisfied with the plan. Patient has been advised to continue all medications as prescribed and advised, and f/u with PCP within 1 week.  Patient is stable from medical standpoint to be discharged. Total time spent during patient evaluation and assessment, discussion with the nurse/family, addressing discharge medications/scripts and coordination of care for safe discharge was in excess of 35 minutes.       Signed Electronically:    CARITO Flores CNP  9:30 AM 7/21/2022

## 2022-07-21 NOTE — FLOWSHEET NOTE
07/21/22 1106   Encounter Summary   Encounter Overview/Reason  Advance Care Planning  (Living Will)   Service Provided For: Patient   Referral/Consult From: Nurse  (Consult:  Advance Directives)   Complexity of Encounter Moderate   Begin Time 0954   End Time  1045   Total Time Calculated 51 min   Encounter    Type Initial Screen/Assessment   Spiritual/Emotional needs   Type Spiritual Support   Advance Care Planning   Type ACP conversation   Assessment/Intervention/Outcome   Assessment Calm; Hopeful  (Pt said \"Being dischaged. \")   Intervention Active listening;Explored/Affirmed feelings, thoughts, concerns   Outcome Optimistic; Concerns relieved   S:  Patient said \"Am alive. \"  Said - unable to complete a Living Will. O:  Patient appeared cheerful, talked a bit about family origin; received information re: LW.  A:  Patient seems receptive to ACP conversation in the future; receptive to visit. P:  Spiritual Care: open to talk with patient through phone in the future.     Electronically signed by Cris Shepard  HeTexted on 7/21/2022 at 11:14 AM

## 2022-07-22 ENCOUNTER — CARE COORDINATION (OUTPATIENT)
Dept: CASE MANAGEMENT | Age: 77
End: 2022-07-22

## 2022-07-22 NOTE — CARE COORDINATION
[facility] Transitions Initial Follow Up Call    Call within 2 business days of discharge: Yes    Patient: Oscar Cho Patient : 1945   MRN: [id number]  Reason for Admission: Hypoglycemia, et al    Discharge Date: 22 RARS: Readmission Risk Score: 8.1      Last Discharge [facility]       Date Complaint Diagnosis Description Type Department Provider    22 Hypoglycemia Hyperglycemia . .. ED to Hosp-Admission (Discharged) (ADMITTED) [facility] Abhishek Carrillo MD; Dane Velarde. .. Spoke with: [address]: [facility]      Non-face-to-face services provided:  Reviewed encounter information for continuity of care prior to follow up Care Transitions Coordination phone call - chart notes, consults, progress notes, test results, med list, appointments, AVS, other information. Care Transitions 24 Hour Call    Do you have a copy of your discharge instructions?: Yes  Do you have all of your prescriptions and are they filled?: Yes  Have you been contacted by a [address]?: No  Have you scheduled your follow up appointment?: Yes  How are you going to get to your appointment?: Car - drive self  Do you feel like you have everything you need to keep you well at home?: Yes  Care Transitions Interventions       Placed a call to the number listed for patient for an initial follow up call for Care Transitions Coordination following discharge from the hospital.  Introduced myself and explained the purpose of my call as well as verifying name, date of birth of patient. Spoke with patient regarding hospitalization, discharge and status thus far. He reported that he is doing well so far. He said that his blood sugar is staying up between 100-120. He said that is where he feels his best most of the time. He said he is still a little weak from the whole incident. He is taking all of his meds and ordered. He is aware of his hospital follow up appointment. He is not aware of any other issues. He sees no need for future calls. Reminded of the purpose of Care Transitions Coordination calls. Given the opportunity to ask questions and answered appropriately. Ensured to have CTN contact information to be used as needed. Encouraged to call for new/ongoing issues, questions, concerns, etc if CTN can be of assistance. Encouraged to make contact with PCP as indicated for any further needs as well. No further scheduled calls needed at this time. Transitions of Care Initial Call    Was this an external facility discharge? No   Challenges to be reviewed by the provider   Additional needs identified to be addressed with provider: No  none             Method of communication with provider : none    Advance Care Planning:   Does patient have an Advance Directive: not on file; education provided. Advance Care Planning   Healthcare Decision Maker:  Patient does not have a current health care decision maker listed and is not able to name one at this time. Care Transition Nurse contacted the patient by telephone to perform post hospital discharge assessment. Verified name and  with patient as identifiers. Provided introduction to self, and explanation of the CTN role. CTN reviewed discharge instructions, medical action plan and red flags with patient who verbalized understanding. Patient given an opportunity to ask questions and does not have any further questions or concerns at this time. Were discharge instructions available to patient? Yes. Reviewed appropriate site of care based on symptoms and resources available to patient including: PCP  Urgent care clinics  [address]   When to call [phone]. The patient agrees to contact the PCP office for questions related to their healthcare. Medication reconciliation was performed with patient, who verbalizes understanding of administration of home medications.  Advised obtaining a 90-day supply of all daily and as-needed medications. CTN provided contact information. No further follow-up call indicated based on severity of symptoms and risk factors. Follow Up  No future appointments.     Jan Argueta RN

## 2022-07-25 ENCOUNTER — OFFICE VISIT (OUTPATIENT)
Dept: INTERNAL MEDICINE | Facility: CLINIC | Age: 77
End: 2022-07-25

## 2022-07-25 VITALS
SYSTOLIC BLOOD PRESSURE: 108 MMHG | BODY MASS INDEX: 28.93 KG/M2 | OXYGEN SATURATION: 98 % | HEART RATE: 71 BPM | HEIGHT: 66 IN | DIASTOLIC BLOOD PRESSURE: 70 MMHG | TEMPERATURE: 97.3 F | WEIGHT: 180 LBS

## 2022-07-25 DIAGNOSIS — E11.649 TYPE 2 DIABETES MELLITUS WITH HYPOGLYCEMIA WITHOUT COMA, WITHOUT LONG-TERM CURRENT USE OF INSULIN: ICD-10-CM

## 2022-07-25 LAB
BLOOD CULTURE, ROUTINE: NORMAL
CULTURE, BLOOD 2: NORMAL

## 2022-07-25 PROCEDURE — 99495 TRANSJ CARE MGMT MOD F2F 14D: CPT | Performed by: NURSE PRACTITIONER

## 2022-07-25 PROCEDURE — 1111F DSCHRG MED/CURRENT MED MERGE: CPT | Performed by: NURSE PRACTITIONER

## 2022-07-25 NOTE — PROGRESS NOTES
Transitional Care Follow Up Visit    Michael Novak is a 76 y.o. male who presents for a transitional care management visit.    Patient was admitted at [facility].    Admission Date: 7/20/2022    Discharge Date: 7/21/2022    Within 48 business hours after discharge [facility] Nurse Line  contacted him via telephone to coordinate his care and needs.      I reviewed and discussed the details of that call along with the discharge summary, hospital problems, inpatient lab results, inpatient diagnostic studies, and consultation reports with .     Current outpatient and discharge medications have been reconciled for the patient.  Reviewed by: LISSET Brownlee      No flowsheet data found.    Course During Hospital Stay:    The patient presented to the [facility] emergency department on 7/20/2022 with complaints of low blood sugar. The patient apparently had taken his morning and evening doses of glyburide and metformin only 5 hours apart. He had noted his blood sugar at home was down to 37 and he had gone to the kitchen to make himself a glass of chocolate milk. He has sat down at the table and noted violent shaking of all 4 extremities.  He told his significant other he felt as though he was going to pass out and was able to lower himself to the floor.  His significant other attempted to give him honey and a candy bar.  He was then taken to the emergency department.  In the emergency department, he was noted to have lactic acidosis.  CT of the head was performed which showed no acute changes.  Chest x-ray reviewed and showed no acute process.  The patient was evaluated by neurology during hospitalization.  The patient's symptoms were felt to be associated with hypoglycemia, and once his blood glucose had normalized he was not showing any focal signs or symptoms suggestive of stroke or seizure.  No further neurological work-up was felt to be necessary at that time.    HPI:  The patient  presents to the office today to follow-up on hospitalization as described above.  The patient was instructed to resume his diabetes medications at time of discharge however he has not been taking either medication (glyburide and metformin) since being discharged from the hospital.  He has been checking his blood glucose multiple times per day and states it has been running anywhere from 100-140 throughout the day, sometimes higher after eating.  The patient states he has been on a low-carb diet and is eating just a couple of large meals during the day.  His last hemoglobin A1c assessed in June was 6.5%.  He has had a 16 pound weight loss since an office visit in March.     The following portions of the patient's history were reviewed and updated as appropriate: allergies, current medications, past family history, past medical history, past social history, past surgical history and problem list.    Objective     Past Medical History:   Diagnosis Date   • Back pain    • Coronary artery disease    • Diabetes mellitus (HCC)    • Hearing deficit    • Hypertension    • Prostate cancer (HCC)       Past Surgical History:   Procedure Laterality Date   • CORONARY ARTERY BYPASS GRAFT     • ROTATOR CUFF REPAIR Left 03/15/2017        Current Outpatient Medications:   •  Accu-Chek Softclix Lancets lancets, 1 each by Other route 2 (two) times a day. Use as instructed, Disp: 200 each, Rfl: 3  •  aspirin 325 MG tablet, Take 325 mg by mouth Daily., Disp: , Rfl:   •  atorvastatin (LIPITOR) 40 MG tablet, Take 1 tablet by mouth once daily, Disp: 90 tablet, Rfl: 3  •  Blood Glucose Monitoring Suppl (Accu-Chek Guide Me) w/Device kit, USE 1  TO CHECK GLUCOSE THREE TIMES DAILY AS NEEDED BEFORE MEAL(S), Disp: 1 kit, Rfl: 0  •  Collagen-Vitamin C-Biotin (COLLAGEN 1500/C PO), Take  by mouth., Disp: , Rfl:   •  glucose blood (Accu-Chek Tram Plus) test strip, 1 each by Other route 2 (Two) Times a Day. Use as instructed, Disp: 100 each, Rfl:  12  •  glucose monitor monitoring kit, 1 each 2 (Two) Times a Day., Disp: 1 each, Rfl: 0  •  KRILL OIL PO, Take 1 capsule by mouth Daily., Disp: , Rfl:   •  losartan (COZAAR) 50 MG tablet, Take 1 tablet by mouth Daily., Disp: 90 tablet, Rfl: 3  •  Multiple Vitamins-Minerals (MULTIVITAMIN MEN) tablet, Take 1 tablet by mouth Daily., Disp: , Rfl:   •  sildenafil (VIAGRA) 100 MG tablet, Take 1 tablet by mouth Daily As Needed for Erectile Dysfunction., Disp: 30 tablet, Rfl: 2  •  vitamin D (ERGOCALCIFEROL) 1.25 MG (04101 UT) capsule capsule, Take 1 capsule by mouth once a week, Disp: 5 capsule, Rfl: 11  •  zinc gluconate 50 MG tablet, Take 50 mg by mouth Daily., Disp: , Rfl:   •  metFORMIN (GLUCOPHAGE) 1000 MG tablet, TAKE 1 TABLET BY MOUTH TWICE DAILY WITH MEALS, Disp: 180 tablet, Rfl: 1    Current Facility-Administered Medications:   •  cyanocobalamin injection 1,000 mcg, 1,000 mcg, Intramuscular, Q14 Days, Kasey Suresh DO, 1,000 mcg at 07/15/22 0815     Vitals:    07/25/22 0905   BP: 108/70   Pulse: 71   Temp: 97.3 °F (36.3 °C)   SpO2: 98%         07/25/22  0905   Weight: 81.6 kg (180 lb)     BMI is >= 25 and <30. (Overweight) The following options were offered after discussion;: exercise counseling/recommendations and nutrition counseling/recommendations      Physical Exam  Vitals and nursing note reviewed.   Constitutional:       General: He is not in acute distress.     Appearance: He is not ill-appearing or toxic-appearing.   HENT:      Head: Normocephalic and atraumatic.      Mouth/Throat:      Mouth: Mucous membranes are moist.      Pharynx: Oropharynx is clear.   Cardiovascular:      Rate and Rhythm: Normal rate and regular rhythm.      Pulses: Normal pulses.      Heart sounds: Normal heart sounds.   Pulmonary:      Effort: Pulmonary effort is normal.      Breath sounds: No wheezing, rhonchi or rales.   Abdominal:      General: Bowel sounds are normal. There is no distension.      Palpations: Abdomen is  soft.      Tenderness: There is no abdominal tenderness.   Musculoskeletal:         General: No swelling or tenderness. Normal range of motion.      Cervical back: Normal range of motion and neck supple. No tenderness.   Skin:     General: Skin is warm and dry.      Findings: No erythema or rash.   Neurological:      General: No focal deficit present.      Mental Status: He is alert and oriented to person, place, and time.   Psychiatric:         Mood and Affect: Mood normal.         Behavior: Behavior normal.         Thought Content: Thought content normal.         Judgment: Judgment normal.       Assessment & Plan   Diagnoses and all orders for this visit:    1. Type 2 diabetes mellitus with hypoglycemia without coma, without long-term current use of insulin ([facility])  -     glucose blood (Accu-Chek Tram Plus) test strip; 1 each by Other route 2 (Two) Times a Day. Use as instructed  Dispense: 100 each; Refill: 12       Patient was hospitalized at [facility] from 7/20 through 7/21 with an episode of hypoglycemia which caused concern for possible seizure like activity.  The patient was conscious and coherent during this episode, able to speak.  He was evaluated by neurology during his hospitalization and his symptoms were felt to be related to hypoglycemia.  No further neurological work-up is indicated at that time.  CT of the head was negative for acute process.  Note the patient had not taken his diabetes medications only 5 hours apart that day, likely leading to hypoglycemic episode.  The patient's hemoglobin A1c was well controlled in June and at 6.5%.  He continues on low-carb diet at this time although I did caution him to eat smaller, more frequent meals throughout the day rather than 2 large meals for the day as I feel this will better regulate his blood glucose throughout the day.  At this time, I feel he can discontinue glyburide and continue on metformin therapy only.  He has an appointment in September in  which his hemoglobin A1c will be reassessed and at that time we can make further assessment.  He will continue to monitor his blood glucoses regularly at home and record these measurements.

## 2022-08-01 ENCOUNTER — CLINICAL SUPPORT (OUTPATIENT)
Dept: INTERNAL MEDICINE | Facility: CLINIC | Age: 77
End: 2022-08-01

## 2022-08-01 DIAGNOSIS — D51.0 PERNICIOUS ANEMIA: ICD-10-CM

## 2022-08-01 PROCEDURE — 96372 THER/PROPH/DIAG INJ SC/IM: CPT

## 2022-08-01 RX ADMIN — CYANOCOBALAMIN 1000 MCG: 1000 INJECTION, SOLUTION INTRAMUSCULAR; SUBCUTANEOUS at 07:44

## 2022-08-08 DIAGNOSIS — E55.9 VITAMIN D DEFICIENCY: ICD-10-CM

## 2022-08-08 DIAGNOSIS — I10 ESSENTIAL HYPERTENSION: ICD-10-CM

## 2022-08-08 RX ORDER — ERGOCALCIFEROL 1.25 MG/1
50000 CAPSULE ORAL WEEKLY
Qty: 12 CAPSULE | Refills: 3 | Status: SHIPPED | OUTPATIENT
Start: 2022-08-08

## 2022-08-08 RX ORDER — LOSARTAN POTASSIUM 50 MG/1
50 TABLET ORAL DAILY
Qty: 90 TABLET | Refills: 3 | Status: SHIPPED | OUTPATIENT
Start: 2022-08-08

## 2022-08-08 RX ORDER — ATORVASTATIN CALCIUM 40 MG/1
40 TABLET, FILM COATED ORAL DAILY
Qty: 90 TABLET | Refills: 3 | Status: SHIPPED | OUTPATIENT
Start: 2022-08-08

## 2022-08-15 ENCOUNTER — CLINICAL SUPPORT (OUTPATIENT)
Dept: INTERNAL MEDICINE | Facility: CLINIC | Age: 77
End: 2022-08-15

## 2022-08-15 DIAGNOSIS — D51.0 PERNICIOUS ANEMIA: Primary | ICD-10-CM

## 2022-08-15 PROCEDURE — 96372 THER/PROPH/DIAG INJ SC/IM: CPT | Performed by: NURSE PRACTITIONER

## 2022-08-15 RX ADMIN — CYANOCOBALAMIN 1000 MCG: 1000 INJECTION, SOLUTION INTRAMUSCULAR; SUBCUTANEOUS at 08:01

## 2022-08-26 DIAGNOSIS — N52.9 ERECTILE DYSFUNCTION OF ORGANIC ORIGIN: ICD-10-CM

## 2022-08-26 RX ORDER — SILDENAFIL 100 MG/1
100 TABLET, FILM COATED ORAL DAILY PRN
Qty: 30 TABLET | Refills: 2 | Status: SHIPPED | OUTPATIENT
Start: 2022-08-26

## 2022-08-30 ENCOUNTER — CLINICAL SUPPORT (OUTPATIENT)
Dept: INTERNAL MEDICINE | Facility: CLINIC | Age: 77
End: 2022-08-30

## 2022-08-30 DIAGNOSIS — D51.0 PERNICIOUS ANEMIA: ICD-10-CM

## 2022-08-30 PROCEDURE — 96372 THER/PROPH/DIAG INJ SC/IM: CPT | Performed by: FAMILY MEDICINE

## 2022-08-30 RX ADMIN — CYANOCOBALAMIN 1000 MCG: 1000 INJECTION, SOLUTION INTRAMUSCULAR; SUBCUTANEOUS at 07:48

## 2022-09-08 ENCOUNTER — LAB (OUTPATIENT)
Dept: INTERNAL MEDICINE | Facility: CLINIC | Age: 77
End: 2022-09-08

## 2022-09-08 DIAGNOSIS — E78.2 MIXED HYPERLIPIDEMIA: ICD-10-CM

## 2022-09-08 DIAGNOSIS — D64.9 ANEMIA, UNSPECIFIED TYPE: ICD-10-CM

## 2022-09-08 DIAGNOSIS — I10 ESSENTIAL HYPERTENSION: ICD-10-CM

## 2022-09-08 DIAGNOSIS — Z12.5 SCREENING PSA (PROSTATE SPECIFIC ANTIGEN): ICD-10-CM

## 2022-09-08 DIAGNOSIS — E11.649 TYPE 2 DIABETES MELLITUS WITH HYPOGLYCEMIA WITHOUT COMA, WITHOUT LONG-TERM CURRENT USE OF INSULIN: ICD-10-CM

## 2022-09-08 DIAGNOSIS — E55.9 VITAMIN D DEFICIENCY: Primary | ICD-10-CM

## 2022-09-09 LAB
25(OH)D3+25(OH)D2 SERPL-MCNC: 63.9 NG/ML (ref 30–100)
ALBUMIN SERPL-MCNC: 4.6 G/DL (ref 3.5–5.2)
ALBUMIN/GLOB SERPL: 2.3 G/DL
ALP SERPL-CCNC: 80 U/L (ref 39–117)
ALT SERPL-CCNC: 34 U/L (ref 1–41)
APPEARANCE UR: CLEAR
AST SERPL-CCNC: 30 U/L (ref 1–40)
BACTERIA #/AREA URNS HPF: NORMAL /HPF
BASOPHILS # BLD AUTO: 0.02 10*3/MM3 (ref 0–0.2)
BASOPHILS NFR BLD AUTO: 0.4 % (ref 0–1.5)
BILIRUB SERPL-MCNC: 0.5 MG/DL (ref 0–1.2)
BILIRUB UR QL STRIP: NEGATIVE
BUN SERPL-MCNC: 23 MG/DL (ref 8–23)
BUN/CREAT SERPL: 31.9 (ref 7–25)
CALCIUM SERPL-MCNC: 9.5 MG/DL (ref 8.6–10.5)
CASTS URNS MICRO: NORMAL
CHLORIDE SERPL-SCNC: 103 MMOL/L (ref 98–107)
CHOLEST SERPL-MCNC: 164 MG/DL (ref 0–200)
CO2 SERPL-SCNC: 23 MMOL/L (ref 22–29)
COLOR UR: YELLOW
CREAT SERPL-MCNC: 0.72 MG/DL (ref 0.76–1.27)
EGFRCR-CYS SERPLBLD CKD-EPI 2021: 94.1 ML/MIN/1.73
EOSINOPHIL # BLD AUTO: 0.07 10*3/MM3 (ref 0–0.4)
EOSINOPHIL NFR BLD AUTO: 1.3 % (ref 0.3–6.2)
EPI CELLS #/AREA URNS HPF: NORMAL /HPF
ERYTHROCYTE [DISTWIDTH] IN BLOOD BY AUTOMATED COUNT: 13.6 % (ref 12.3–15.4)
GLOBULIN SER CALC-MCNC: 2 GM/DL
GLUCOSE SERPL-MCNC: 129 MG/DL (ref 65–99)
GLUCOSE UR QL STRIP: NEGATIVE
HBA1C MFR BLD: 7.5 % (ref 4.8–5.6)
HCT VFR BLD AUTO: 39.8 % (ref 37.5–51)
HDLC SERPL-MCNC: 64 MG/DL (ref 40–60)
HGB BLD-MCNC: 13.4 G/DL (ref 13–17.7)
HGB UR QL STRIP: NEGATIVE
IMM GRANULOCYTES # BLD AUTO: 0.01 10*3/MM3 (ref 0–0.05)
IMM GRANULOCYTES NFR BLD AUTO: 0.2 % (ref 0–0.5)
KETONES UR QL STRIP: NEGATIVE
LDLC SERPL CALC-MCNC: 86 MG/DL (ref 0–100)
LEUKOCYTE ESTERASE UR QL STRIP: ABNORMAL
LYMPHOCYTES # BLD AUTO: 1.37 10*3/MM3 (ref 0.7–3.1)
LYMPHOCYTES NFR BLD AUTO: 26.3 % (ref 19.6–45.3)
MCH RBC QN AUTO: 30.2 PG (ref 26.6–33)
MCHC RBC AUTO-ENTMCNC: 33.7 G/DL (ref 31.5–35.7)
MCV RBC AUTO: 89.6 FL (ref 79–97)
MICROALBUMIN UR-MCNC: 85.3 UG/ML
MONOCYTES # BLD AUTO: 0.56 10*3/MM3 (ref 0.1–0.9)
MONOCYTES NFR BLD AUTO: 10.7 % (ref 5–12)
NEUTROPHILS # BLD AUTO: 3.18 10*3/MM3 (ref 1.7–7)
NEUTROPHILS NFR BLD AUTO: 61.1 % (ref 42.7–76)
NITRITE UR QL STRIP: NEGATIVE
NRBC BLD AUTO-RTO: 0 /100 WBC (ref 0–0.2)
PH UR STRIP: 5.5 [PH] (ref 5–8)
PLATELET # BLD AUTO: 184 10*3/MM3 (ref 140–450)
POTASSIUM SERPL-SCNC: 4.3 MMOL/L (ref 3.5–5.2)
PROT SERPL-MCNC: 6.6 G/DL (ref 6–8.5)
PROT UR QL STRIP: ABNORMAL
PSA SERPL-MCNC: 0.33 NG/ML (ref 0–4)
RBC # BLD AUTO: 4.44 10*6/MM3 (ref 4.14–5.8)
RBC #/AREA URNS HPF: NORMAL /HPF
SODIUM SERPL-SCNC: 137 MMOL/L (ref 136–145)
SP GR UR STRIP: 1.03 (ref 1–1.03)
TRIGL SERPL-MCNC: 76 MG/DL (ref 0–150)
TSH SERPL DL<=0.005 MIU/L-ACNC: 3.72 UIU/ML (ref 0.27–4.2)
URATE SERPL-MCNC: 4.5 MG/DL (ref 3.4–7)
UROBILINOGEN UR STRIP-MCNC: ABNORMAL MG/DL
VLDLC SERPL CALC-MCNC: 14 MG/DL (ref 5–40)
WBC # BLD AUTO: 5.21 10*3/MM3 (ref 3.4–10.8)
WBC #/AREA URNS HPF: NORMAL /HPF

## 2022-09-12 ENCOUNTER — OFFICE VISIT (OUTPATIENT)
Dept: INTERNAL MEDICINE | Facility: CLINIC | Age: 77
End: 2022-09-12

## 2022-09-12 VITALS
OXYGEN SATURATION: 98 % | TEMPERATURE: 96.8 F | DIASTOLIC BLOOD PRESSURE: 72 MMHG | WEIGHT: 176 LBS | HEIGHT: 66 IN | HEART RATE: 62 BPM | SYSTOLIC BLOOD PRESSURE: 128 MMHG | BODY MASS INDEX: 28.28 KG/M2

## 2022-09-12 DIAGNOSIS — E78.2 MIXED HYPERLIPIDEMIA: ICD-10-CM

## 2022-09-12 DIAGNOSIS — I10 PRIMARY HYPERTENSION: ICD-10-CM

## 2022-09-12 DIAGNOSIS — E11.65 TYPE 2 DIABETES MELLITUS WITH HYPERGLYCEMIA, WITHOUT LONG-TERM CURRENT USE OF INSULIN: ICD-10-CM

## 2022-09-12 DIAGNOSIS — Z00.00 ENCOUNTER FOR SUBSEQUENT ANNUAL WELLNESS VISIT (AWV) IN MEDICARE PATIENT: Primary | ICD-10-CM

## 2022-09-12 DIAGNOSIS — H61.23 BILATERAL IMPACTED CERUMEN: ICD-10-CM

## 2022-09-12 DIAGNOSIS — I25.10 CORONARY ARTERY DISEASE INVOLVING NATIVE CORONARY ARTERY OF NATIVE HEART WITHOUT ANGINA PECTORIS: ICD-10-CM

## 2022-09-12 PROCEDURE — G0439 PPPS, SUBSEQ VISIT: HCPCS | Performed by: NURSE PRACTITIONER

## 2022-09-12 PROCEDURE — 1159F MED LIST DOCD IN RCRD: CPT | Performed by: NURSE PRACTITIONER

## 2022-09-12 PROCEDURE — 1126F AMNT PAIN NOTED NONE PRSNT: CPT | Performed by: NURSE PRACTITIONER

## 2022-09-12 PROCEDURE — 1170F FXNL STATUS ASSESSED: CPT | Performed by: NURSE PRACTITIONER

## 2022-09-12 PROCEDURE — 69209 REMOVE IMPACTED EAR WAX UNI: CPT | Performed by: NURSE PRACTITIONER

## 2022-09-12 NOTE — PROGRESS NOTES
Procedure   Ear Cerumen Removal    Date/Time: 9/12/2022 8:33 AM  Performed by: Rylie Snow APRN  Authorized by: Rylie Snow APRN     Anesthesia:  Local Anesthetic: none  Location details: left ear and right ear  Patient tolerance: patient tolerated the procedure well with no immediate complications  Procedure type: irrigation   Sedation:  Patient sedated: no

## 2022-09-12 NOTE — PROGRESS NOTES
The ABCs of the Annual Wellness Visit  Subsequent Medicare Wellness Visit    Chief Complaint   Patient presents with   • Medicare Wellness-subsequent      Subjective    History of Present Illness:  Michael Novak is a 76 y.o. male who presents for a Subsequent Medicare Wellness Visit.  He states he has had some diarrhea off and on for the last 2 weeks.  He denies any abdominal pain, fever/chills.  He has not seen any blood in his stool and denies black or tarry stools.  He is unsure if he has ever had diarrhea as a result of metformin.    During his last office visit in July, he was followed up for a an overnight hospitalization in which his blood glucose dropped down to 37 and he had violent shaking of all 4 extremities.  He had been evaluated by neurology during that hospitalization and it was felt his symptoms were likely related to hypoglycemia, and not as a result of seizure activity.  His hemoglobin A1c at that time was 6.5, and glyburide was discontinued.  He was continued on metformin 1000 mg twice daily and he is currently on this dosage.  His A1c is now 7.5.  The patient does admit he has increased his carbohydrate intake in the last couple of months.  He is checking his blood glucose around 3 times a day and on average this is running around 120.  He does perform intermittent fasting, fasting for 16 hours a day.  During his 8-hour timeframe of eating, he usually eats 2 large meals.    The patient is status post coronary artery bypass grafting and does follow with cardiology, next scheduled to see in October.  He is maintained on aspirin and atorvastatin.  He states that he has been doing some research on statin therapy and hopes that 1 day he can discontinue this.  We did discuss the rationale for statin therapy with history of CABG, and I have encouraged him to speak to his cardiologist about this.  We may be able to decrease dosage of statin given that his cholesterol is much improved from when  it was last reassessed, however I would not feel comfortable with him stopping statin therapy altogether at this time.  Patient's blood pressure is well controlled in the office today at 128/72.  He does not monitor his blood pressure at home.    The patient's last colonoscopy was in 2017 at [facility] and he did have polyps noted.  Recommended to repeat in 5 years.    Blood pressure is well controlled in the office today.  We will continue current medications.    The following portions of the patient's history were reviewed and   updated as appropriate: allergies, current medications, past family history, past medical history, past social history, past surgical history and problem list.    Compared to one year ago, the patient feels his physical   health is better.    Compared to one year ago, the patient feels his mental   health is the same.    Recent Hospitalizations:  He was admitted to [facility] during the last year.  He did go to the emergency department at [facility] on 7/20/2022 with complaints of low blood sugar.  He apparently had taken his morning and evening doses of diabetes medications only 5 hours apart, and blood glucose had gone down to 37.  He had violent shaking of all 4 extremities at home and thought he may pass out. The patient was evaluated by neurology during hospitalization.  The patient's symptoms were felt to be associated with hypoglycemia, and once his blood glucose had normalized he was not showing any focal signs or symptoms suggestive of stroke or seizure.  No further neurological work-up was felt to be necessary at that time.     Current Medical Providers:  Patient Care Team:  Rylie Snow APRN as PCP - General (Nurse Practitioner)    Outpatient Medications Prior to Visit   Medication Sig Dispense Refill   • Accu-Chek Softclix Lancets lancets 1 each by Other route 2 (two) times a day. Use as instructed 200 each 3   • aspirin 325 MG tablet Take 325 mg by mouth  Daily.     • atorvastatin (LIPITOR) 40 MG tablet Take 1 tablet by mouth Daily. 90 tablet 3   • Blood Glucose Monitoring Suppl (Accu-Chek Guide Me) w/Device kit USE 1  TO CHECK GLUCOSE THREE TIMES DAILY AS NEEDED BEFORE MEAL(S) 1 kit 0   • Collagen-Vitamin C-Biotin (COLLAGEN 1500/C PO) Take  by mouth.     • glucose blood (Accu-Chek Tram Plus) test strip 1 each by Other route 2 (Two) Times a Day. Use as instructed 100 each 12   • glucose monitor monitoring kit 1 each 2 (Two) Times a Day. 1 each 0   • KRILL OIL PO Take 1 capsule by mouth Daily.     • losartan (COZAAR) 50 MG tablet Take 1 tablet by mouth Daily. 90 tablet 3   • metFORMIN (GLUCOPHAGE) 1000 MG tablet Take 1 tablet by mouth 2 (Two) Times a Day With Meals. 180 tablet 3   • Multiple Vitamins-Minerals (MULTIVITAMIN MEN) tablet Take 1 tablet by mouth Daily.     • sildenafil (VIAGRA) 100 MG tablet Take 1 tablet by mouth Daily As Needed for Erectile Dysfunction. 30 tablet 2   • vitamin D (ERGOCALCIFEROL) 1.25 MG (15262 UT) capsule capsule Take 1 capsule by mouth 1 (One) Time Per Week. 12 capsule 3   • zinc gluconate 50 MG tablet Take 50 mg by mouth Daily.       Facility-Administered Medications Prior to Visit   Medication Dose Route Frequency Provider Last Rate Last Admin   • cyanocobalamin injection 1,000 mcg  1,000 mcg Intramuscular Q14 Days Kasey Suresh DO   1,000 mcg at 08/30/22 0748       No opioid medication identified on active medication list. I have reviewed chart for other potential  high risk medication/s and harmful drug interactions in the elderly.          Aspirin is on active medication list. Aspirin use is indicated based on review of current medical condition/s. Pros and cons of this therapy have been discussed today. Benefits of this medication outweigh potential harm.  Patient has been encouraged to continue taking this medication.  .      Patient Active Problem List   Diagnosis   • Prostate cancer (HCC)   • Encounter for consultation  "  • Use of leuprolide acetate (Lupron)   • Former smoker   • Encounter to discuss procedure   • History of radiation therapy   • Encounter for follow-up surveillance of prostate cancer   • Anemia   • Chronic heartburn   • Complete tear of left rotator cuff   • Family history of colonic polyps   • Pernicious anemia   • ACE-inhibitor cough   • Arthralgia of right shoulder region   • Back pain   • At low risk for fall   • Dizziness   • Elevated lipids   • Elevated PSA   • Erectile dysfunction of organic origin   • Fatigue   • Gastroesophageal reflux disease without esophagitis   • Knee pain   • Negative depression screening   • Vitamin D deficiency   • Coronary artery disease involving native coronary artery without angina pectoris   • Coronary artery disease involving native coronary artery   • Mixed hyperlipidemia   • Status post aorto-coronary artery bypass graft   • Primary hypertension     Advance Care Planning  Advance Directive is not on file.  ACP discussion was declined by the patient. Patient does not have an advance directive, declines further assistance.          Objective    Vitals:    09/12/22 0735   BP: 128/72   BP Location: Left arm   Patient Position: Sitting   Cuff Size: Adult   Pulse: 62   Temp: 96.8 °F (36 °C)   TempSrc: Temporal   SpO2: 98%   Weight: 79.8 kg (176 lb)   Height: 167.6 cm (66\")   PainSc: 0-No pain     Estimated body mass index is 28.41 kg/m² as calculated from the following:    Height as of this encounter: 167.6 cm (66\").    Weight as of this encounter: 79.8 kg (176 lb).    BMI is >= 25 and <30. (Overweight) The following options were offered after discussion;: exercise counseling/recommendations and nutrition counseling/recommendations    Does the patient have evidence of cognitive impairment? No    Physical Exam  Lab Results   Component Value Date    CHLPL 164 09/08/2022    TRIG 76 09/08/2022    HDL 64 (H) 09/08/2022    LDL 86 09/08/2022    VLDL 14 09/08/2022    HGBA1C 7.50 (H) " 2022     HEALTH RISK ASSESSMENT    Smoking Status:  Social History     Tobacco Use   Smoking Status Former Smoker   • Packs/day: 0.50   • Years: 12.00   • Pack years: 6.00   • Quit date:    • Years since quittin.7   Smokeless Tobacco Never Used     Alcohol Consumption:  Social History     Substance and Sexual Activity   Alcohol Use No     Fall Risk Screen:    YO Fall Risk Assessment was completed, and patient is at LOW risk for falls.Assessment completed on:2022    Depression Screening:  PHQ-2/PHQ-9 Depression Screening 2022   Retired PHQ-9 Total Score -   Retired Total Score -   Little Interest or Pleasure in Doing Things 0-->not at all   Feeling Down, Depressed or Hopeless 0-->not at all   PHQ-9: Brief Depression Severity Measure Score 0       Health Habits and Functional and Cognitive Screening:  Functional & Cognitive Status 2022   Do you have difficulty preparing food and eating? No   Do you have difficulty bathing yourself, getting dressed or grooming yourself? No   Do you have difficulty using the toilet? No   Do you have difficulty moving around from place to place? No   Do you have trouble with steps or getting out of a bed or a chair? No   Current Diet Low Carb Diet   Dental Exam Up to date   Eye Exam Up to date   Exercise (times per week) 5 times per week   Current Exercises Include Walking   Current Exercise Activities Include -   Do you need help using the phone?  No   Are you deaf or do you have serious difficulty hearing?  Yes   Do you need help with transportation? No   Do you need help shopping? No   Do you need help preparing meals?  No   Do you need help with housework?  No   Do you need help with laundry? No   Do you need help taking your medications? No   Do you need help managing money? No   Do you ever drive or ride in a car without wearing a seat belt? No   Have you felt unusual stress, anger or loneliness in the last month? No   Who do you live with? Spouse    If you need help, do you have trouble finding someone available to you? No   Have you been bothered in the last four weeks by sexual problems? No   Do you have difficulty concentrating, remembering or making decisions? Yes       Age-appropriate Screening Schedule:  Refer to the list below for future screening recommendations based on patient's age, sex and/or medical conditions. Orders for these recommended tests are listed in the plan section. The patient has been provided with a written plan.    Health Maintenance   Topic Date Due   • TDAP/TD VACCINES (1 - Tdap) Never done   • INFLUENZA VACCINE  10/01/2022   • DIABETIC EYE EXAM  11/23/2022   • HEMOGLOBIN A1C  03/08/2023   • LIPID PANEL  09/08/2023   • URINE MICROALBUMIN  09/08/2023   • ZOSTER VACCINE  Completed              Assessment & Plan   CMS Preventative Services Quick Reference  Risk Factors Identified During Encounter  Cardiovascular Disease  Hearing Problem  The above risks/problems have been discussed with the patient.  Follow up actions/plans if indicated are seen below in the Assessment/Plan Section.  Pertinent information has been shared with the patient in the After Visit Summary.    Diagnoses and all orders for this visit:    1. Encounter for subsequent annual wellness visit (AWV) in Medicare patient (Primary)    2. Bilateral impacted cerumen  -     Cerumen Removal    3. Coronary artery disease involving native coronary artery of native heart without angina pectoris    4. Mixed hyperlipidemia    5. Type 2 diabetes mellitus with hyperglycemia, without long-term current use of insulin (HCC)    6. Primary hypertension      Patient presents for subsequent annual wellness visit.  His A1c has gone up to 7.5 from 6.5 in June.  He is now on metformin monotherapy since the end of July.  We will continue with metformin and patient is going to continue with weight loss efforts with intermittent fasting and he is going to decrease carbohydrate intake as well.   We will reassess A1c in 3 months.  He has lost 20 pounds since March.  Regarding his diarrhea, unsure if this is a side effect of metformin or a self-limiting illness.  Have advised him to take a probiotic over-the-counter to see if this helps.    Patient follows with cardiology regularly for coronary artery disease status post coronary artery bypass grafting.  Patient is currently on aspirin and Lipitor.  His lipid panel is much improved compared to June with total cholesterol going from 208 to 164.  LDL cholesterol down to 86 from 122.  He may be able to decrease dosage of Lipitor, and I would encourage him to speak to his cardiologist about this in October.    PSA within normal limits.    Patient will be due for colonoscopy at the end of this year due to polyps on his last colonoscopy in 2017.  We will send referral if patient does not want to make the appointment himself.    Follow Up:   Return in about 3 months (around 12/12/2022) for Recheck.     An After Visit Summary and PPPS were made available to the patient.    I spent 35 minutes caring for  on this date of service. This time includes time spent by me in the following activities:preparing for the visit, reviewing tests, obtaining and/or reviewing a separately obtained history, performing a medically appropriate examination and/or evaluation , counseling and educating the patient/family/caregiver, ordering medications, tests, or procedures and documenting information in the medical record

## 2022-09-30 ENCOUNTER — CLINICAL SUPPORT (OUTPATIENT)
Dept: INTERNAL MEDICINE | Facility: CLINIC | Age: 77
End: 2022-09-30

## 2022-09-30 DIAGNOSIS — D51.0 PERNICIOUS ANEMIA: ICD-10-CM

## 2022-09-30 PROCEDURE — 96372 THER/PROPH/DIAG INJ SC/IM: CPT | Performed by: FAMILY MEDICINE

## 2022-09-30 RX ADMIN — CYANOCOBALAMIN 1000 MCG: 1000 INJECTION, SOLUTION INTRAMUSCULAR; SUBCUTANEOUS at 07:50

## 2022-10-03 ENCOUNTER — TELEPHONE (OUTPATIENT)
Dept: CARDIOLOGY CLINIC | Age: 77
End: 2022-10-03

## 2022-10-03 NOTE — TELEPHONE ENCOUNTER
Left vm with patient's emergency contact as his phone was not in service that I was needing to move patient's appointment time on 10/13 with Dr. Ruby Vuong.

## 2022-10-05 ENCOUNTER — TELEPHONE (OUTPATIENT)
Dept: CARDIOLOGY CLINIC | Age: 77
End: 2022-10-05

## 2022-10-05 NOTE — TELEPHONE ENCOUNTER
Tried to reach patient and emergency contact to advise Dr. Adriel Hunter will not be in clinic on 10/13 at 9:15 and patient will be seeing an APRN. Phones are no longer in service.

## 2022-10-12 ENCOUNTER — TELEPHONE (OUTPATIENT)
Dept: CARDIOLOGY CLINIC | Age: 77
End: 2022-10-12

## 2022-10-13 ENCOUNTER — OFFICE VISIT (OUTPATIENT)
Dept: CARDIOLOGY CLINIC | Age: 77
End: 2022-10-13
Payer: MEDICARE

## 2022-10-13 VITALS
DIASTOLIC BLOOD PRESSURE: 64 MMHG | HEIGHT: 66 IN | BODY MASS INDEX: 27.9 KG/M2 | SYSTOLIC BLOOD PRESSURE: 116 MMHG | HEART RATE: 66 BPM | WEIGHT: 173.6 LBS | OXYGEN SATURATION: 98 %

## 2022-10-13 DIAGNOSIS — I10 ESSENTIAL HYPERTENSION: ICD-10-CM

## 2022-10-13 DIAGNOSIS — I25.10 CORONARY ARTERY DISEASE INVOLVING NATIVE CORONARY ARTERY OF NATIVE HEART WITHOUT ANGINA PECTORIS: Primary | ICD-10-CM

## 2022-10-13 DIAGNOSIS — E78.5 DYSLIPIDEMIA: ICD-10-CM

## 2022-10-13 PROCEDURE — 1123F ACP DISCUSS/DSCN MKR DOCD: CPT | Performed by: NURSE PRACTITIONER

## 2022-10-13 PROCEDURE — 99214 OFFICE O/P EST MOD 30 MIN: CPT | Performed by: NURSE PRACTITIONER

## 2022-10-13 RX ORDER — ATORVASTATIN CALCIUM 40 MG/1
20 TABLET, FILM COATED ORAL DAILY
Qty: 30 TABLET | Refills: 3 | Status: SHIPPED | OUTPATIENT
Start: 2022-10-13

## 2022-10-13 RX ORDER — ACETAMINOPHEN 160 MG
2000 TABLET,DISINTEGRATING ORAL DAILY
COMMUNITY

## 2022-10-13 ASSESSMENT — ENCOUNTER SYMPTOMS
SORE THROAT: 0
WHEEZING: 0
CHEST TIGHTNESS: 0
SHORTNESS OF BREATH: 0
COUGH: 0

## 2022-10-14 ENCOUNTER — CLINICAL SUPPORT (OUTPATIENT)
Dept: INTERNAL MEDICINE | Facility: CLINIC | Age: 77
End: 2022-10-14

## 2022-10-14 DIAGNOSIS — D51.0 PERNICIOUS ANEMIA: ICD-10-CM

## 2022-10-14 PROCEDURE — 96372 THER/PROPH/DIAG INJ SC/IM: CPT | Performed by: FAMILY MEDICINE

## 2022-10-14 RX ADMIN — CYANOCOBALAMIN 1000 MCG: 1000 INJECTION, SOLUTION INTRAMUSCULAR; SUBCUTANEOUS at 08:56

## 2022-10-31 ENCOUNTER — CLINICAL SUPPORT (OUTPATIENT)
Dept: INTERNAL MEDICINE | Facility: CLINIC | Age: 77
End: 2022-10-31

## 2022-10-31 DIAGNOSIS — Z23 NEED FOR IMMUNIZATION AGAINST INFLUENZA: Primary | ICD-10-CM

## 2022-10-31 DIAGNOSIS — D51.0 PERNICIOUS ANEMIA: ICD-10-CM

## 2022-10-31 PROCEDURE — 90662 IIV NO PRSV INCREASED AG IM: CPT | Performed by: NURSE PRACTITIONER

## 2022-10-31 PROCEDURE — G0008 ADMIN INFLUENZA VIRUS VAC: HCPCS | Performed by: NURSE PRACTITIONER

## 2022-10-31 PROCEDURE — 96372 THER/PROPH/DIAG INJ SC/IM: CPT | Performed by: NURSE PRACTITIONER

## 2022-10-31 RX ADMIN — CYANOCOBALAMIN 1000 MCG: 1000 INJECTION, SOLUTION INTRAMUSCULAR; SUBCUTANEOUS at 10:54

## 2022-11-15 ENCOUNTER — CLINICAL SUPPORT (OUTPATIENT)
Dept: INTERNAL MEDICINE | Facility: CLINIC | Age: 77
End: 2022-11-15

## 2022-11-15 DIAGNOSIS — D51.0 PERNICIOUS ANEMIA: ICD-10-CM

## 2022-11-15 PROCEDURE — 96372 THER/PROPH/DIAG INJ SC/IM: CPT | Performed by: NURSE PRACTITIONER

## 2022-11-15 RX ADMIN — CYANOCOBALAMIN 1000 MCG: 1000 INJECTION, SOLUTION INTRAMUSCULAR; SUBCUTANEOUS at 09:33

## 2022-11-30 ENCOUNTER — CLINICAL SUPPORT (OUTPATIENT)
Dept: INTERNAL MEDICINE | Facility: CLINIC | Age: 77
End: 2022-11-30

## 2022-11-30 DIAGNOSIS — D51.0 PERNICIOUS ANEMIA: ICD-10-CM

## 2022-11-30 PROCEDURE — 96372 THER/PROPH/DIAG INJ SC/IM: CPT | Performed by: NURSE PRACTITIONER

## 2022-11-30 RX ADMIN — CYANOCOBALAMIN 1000 MCG: 1000 INJECTION, SOLUTION INTRAMUSCULAR; SUBCUTANEOUS at 07:45

## 2022-12-14 ENCOUNTER — CLINICAL SUPPORT (OUTPATIENT)
Dept: INTERNAL MEDICINE | Facility: CLINIC | Age: 77
End: 2022-12-14

## 2022-12-14 ENCOUNTER — ANESTHESIA EVENT (OUTPATIENT)
Dept: ENDOSCOPY | Age: 77
End: 2022-12-14
Payer: MEDICARE

## 2022-12-14 DIAGNOSIS — D51.0 PERNICIOUS ANEMIA: Primary | ICD-10-CM

## 2022-12-14 PROCEDURE — 96372 THER/PROPH/DIAG INJ SC/IM: CPT | Performed by: NURSE PRACTITIONER

## 2022-12-14 RX ADMIN — CYANOCOBALAMIN 1000 MCG: 1000 INJECTION, SOLUTION INTRAMUSCULAR; SUBCUTANEOUS at 08:54

## 2022-12-15 ENCOUNTER — ANESTHESIA (OUTPATIENT)
Dept: ENDOSCOPY | Age: 77
End: 2022-12-15
Payer: MEDICARE

## 2022-12-15 ENCOUNTER — HOSPITAL ENCOUNTER (OUTPATIENT)
Age: 77
Setting detail: OUTPATIENT SURGERY
Discharge: HOME | End: 2022-12-15
Attending: INTERNAL MEDICINE | Admitting: INTERNAL MEDICINE
Payer: MEDICARE

## 2022-12-15 VITALS
BODY MASS INDEX: 27.32 KG/M2 | TEMPERATURE: 97.8 F | HEIGHT: 66 IN | DIASTOLIC BLOOD PRESSURE: 71 MMHG | OXYGEN SATURATION: 98 % | HEART RATE: 55 BPM | WEIGHT: 170 LBS | RESPIRATION RATE: 16 BRPM | SYSTOLIC BLOOD PRESSURE: 134 MMHG

## 2022-12-15 DIAGNOSIS — Z86.010 HX OF COLONIC POLYPS: ICD-10-CM

## 2022-12-15 LAB
GLUCOSE BLD-MCNC: 110 MG/DL (ref 70–99)
PERFORMED ON: ABNORMAL

## 2022-12-15 PROCEDURE — 82947 ASSAY GLUCOSE BLOOD QUANT: CPT

## 2022-12-15 PROCEDURE — 7100000010 HC PHASE II RECOVERY - FIRST 15 MIN: Performed by: INTERNAL MEDICINE

## 2022-12-15 PROCEDURE — 3609010700 HC COLONOSCOPY POLYPECTOMY REMOVAL SNARE/STOMA: Performed by: INTERNAL MEDICINE

## 2022-12-15 PROCEDURE — 2500000003 HC RX 250 WO HCPCS: Performed by: NURSE ANESTHETIST, CERTIFIED REGISTERED

## 2022-12-15 PROCEDURE — 3700000000 HC ANESTHESIA ATTENDED CARE: Performed by: INTERNAL MEDICINE

## 2022-12-15 PROCEDURE — 2580000003 HC RX 258: Performed by: INTERNAL MEDICINE

## 2022-12-15 PROCEDURE — 88305 TISSUE EXAM BY PATHOLOGIST: CPT

## 2022-12-15 PROCEDURE — 7100000011 HC PHASE II RECOVERY - ADDTL 15 MIN: Performed by: INTERNAL MEDICINE

## 2022-12-15 PROCEDURE — 2709999900 HC NON-CHARGEABLE SUPPLY: Performed by: INTERNAL MEDICINE

## 2022-12-15 PROCEDURE — 3700000001 HC ADD 15 MINUTES (ANESTHESIA): Performed by: INTERNAL MEDICINE

## 2022-12-15 PROCEDURE — 6360000002 HC RX W HCPCS: Performed by: NURSE ANESTHETIST, CERTIFIED REGISTERED

## 2022-12-15 RX ORDER — PROPOFOL 10 MG/ML
INJECTION, EMULSION INTRAVENOUS PRN
Status: DISCONTINUED | OUTPATIENT
Start: 2022-12-15 | End: 2022-12-15 | Stop reason: SDUPTHER

## 2022-12-15 RX ORDER — SODIUM CHLORIDE, SODIUM LACTATE, POTASSIUM CHLORIDE, CALCIUM CHLORIDE 600; 310; 30; 20 MG/100ML; MG/100ML; MG/100ML; MG/100ML
INJECTION, SOLUTION INTRAVENOUS CONTINUOUS
Status: DISCONTINUED | OUTPATIENT
Start: 2022-12-15 | End: 2022-12-15 | Stop reason: HOSPADM

## 2022-12-15 RX ORDER — LIDOCAINE HYDROCHLORIDE 10 MG/ML
INJECTION, SOLUTION INFILTRATION; PERINEURAL PRN
Status: DISCONTINUED | OUTPATIENT
Start: 2022-12-15 | End: 2022-12-15 | Stop reason: SDUPTHER

## 2022-12-15 RX ADMIN — PROPOFOL 200 MG: 10 INJECTION, EMULSION INTRAVENOUS at 09:09

## 2022-12-15 RX ADMIN — SODIUM CHLORIDE, POTASSIUM CHLORIDE, SODIUM LACTATE AND CALCIUM CHLORIDE: 600; 310; 30; 20 INJECTION, SOLUTION INTRAVENOUS at 07:45

## 2022-12-15 RX ADMIN — LIDOCAINE HYDROCHLORIDE 40 MG: 10 INJECTION, SOLUTION INFILTRATION; PERINEURAL at 09:09

## 2022-12-15 ASSESSMENT — PAIN SCALES - GENERAL
PAINLEVEL_OUTOF10: 0

## 2022-12-15 ASSESSMENT — PAIN - FUNCTIONAL ASSESSMENT: PAIN_FUNCTIONAL_ASSESSMENT: NONE - DENIES PAIN

## 2022-12-15 NOTE — DISCHARGE INSTRUCTIONS
Recommendations:  1. Repeat colonoscopy: pending pathology -in 5 years due to findings noted colonoscopy today and is less than optimal prep quality; sooner if he were to develop lower GI symptoms such as bleeding, abdominal pain, change in bowel habits or stool caliber or if the patient has anemia or unexplained weight loss in the future. 2. Await biopsy results-you will receive a letter with your results within 7-10 days     - Resume previous meds and diet  - GI clinic f/u PRN   - Keep scheduled f/u appts with other MDs      - NO ASA/NSAIDs x 2 weeks      Colonoscopy: What to Expect at [address] [facility]  After a colonoscopy, you'll stay at the clinic until you wake up. Then you can go home. But you'll need to arrange for a ride. Your doctor will tell you when you can eat and do your other usual activities. Your doctor will talk to you about when you'll need your next colonoscopy. Your doctor can help you decide how often you need to be checked. This will depend on the results of your test and your risk for colorectal cancer. After the test, you may be bloated or have gas pains. You may need to pass gas. If a biopsy was done or a polyp was removed, you may have streaks of blood in your stool (feces) for a few days. Problems such as heavy rectal bleeding may not occur until several weeks after the test. This isn't common. But it can happen after polyps are removed. This care sheet gives you a general idea about how long it will take for you to recover. But each person recovers at a different pace. Follow the steps below to get better as quickly as possible. How can you care for yourself at home? Activity    Rest when you feel tired. You can do your normal activities when it feels okay to do so. Diet    Follow your doctor's directions for eating. Unless your doctor has told you not to, drink plenty of fluids. This helps to replace the fluids that were lost during the colon prep.      Do not drink alcohol. Medicines    Your doctor will tell you if and when you can restart your medicines. You will also be given instructions about taking any new medicines. If you stopped taking aspirin or some other blood thinner, your doctor will tell you when to start taking it again. If polyps were removed or a biopsy was done during the test, your doctor may tell you not to take aspirin or other anti-inflammatory medicines for a few days. These include ibuprofen (Advil, Motrin) and naproxen (Aleve). Other instructions    For your safety, do not drive or operate machinery until the medicine wears off and you can think clearly. Your doctor may tell you not to drive or operate machinery until the day after your test.     Do not sign legal documents or make major decisions until the medicine wears off and you can think clearly. The anesthesia can make it hard for you to fully understand what you are agreeing to. Follow-up care is a key part of your treatment and safety. Be sure to make and go to all appointments, and call your doctor if you are having problems. It's also a good idea to know your test results and keep a list of the medicines you take. When should you call for help? Call 911 anytime you think you may need emergency care. For example, call if:    You passed out (lost consciousness). You pass maroon or bloody stools. You have trouble breathing. Call your doctor now or seek immediate medical care if:    You have pain that does not get better after you take pain medicine. You are sick to your stomach or cannot drink fluids. You have new or worse belly pain. You have blood in your stools. You have a fever. You cannot pass stools or gas. Watch closely for changes in your health, and be sure to contact your doctor if you have any problems. Where can you learn more?   Go to http://www.woods.com/ and enter E264 to learn more about \"Colonoscopy: What to Expect at Home. \"  Current as of: May 4, 2022               Content Version: 13.5  © 5700-6110 [employer]. Care instructions adapted under license by [facility] ([facility]). If you have questions about a medical condition or this instruction, always ask your healthcare professional. [address] any warranty or liability for your use of this information.

## 2022-12-15 NOTE — ANESTHESIA POSTPROCEDURE EVALUATION
Department of Anesthesiology  Postprocedure Note    Patient: Marlyn Lopez MRN: [id number]  YOB: 1945  Date of evaluation: 12/15/2022      Procedure Summary     Date: 12/15/22 Room / Location: [address]    Anesthesia Start: 9093 Anesthesia Stop:     Procedure: COLONOSCOPY POLYPECTOMY REMOVAL SNARE/STOMA Diagnosis:       Hx of colonic polyps      (Hx of colonic polyps [Z86.010])    Surgeons: Ana Luisa Rene MD Responsible Provider: CARITO Norton CRNA    Anesthesia Type: general, TIVA ASA Status: 3          Anesthesia Type: No value filed.     Last Phase I: Last Score: 10    Last Phase II:        Anesthesia Post Evaluation    Patient location during evaluation: bedside  Patient participation: complete - patient participated  Level of consciousness: sleepy but conscious  Pain score: 0  Airway patency: patent  Nausea & Vomiting: no nausea and no vomiting  Complications: no  Cardiovascular status: hemodynamically stable and blood pressure returned to baseline  Respiratory status: acceptable and nasal cannula  Hydration status: stable

## 2022-12-15 NOTE — H&P
Patient Name: Jeffrey Dela rCuz : 1945  MRN: [id number]  DATE: 12/15/22    Allergies: Allergies   Allergen Reactions    Ace Inhibitors      Other reaction(s): Cough    Valium [Diazepam] Nausea Only        ENDOSCOPY  History and Physical    Procedure:    [] Diagnostic Colonoscopy       [] Screening Colonoscopy  [] EGD      [] ERCP      [] EUS       [] Other    [x] Previous office notes/History and Physical reviewed from the patients chart. Please see EMR for further details of HPI. I have examined the patient's status immediately prior to the procedure and:      Indications/HPI:    []Abdominal Pain   []Cancer- GI/Lung     []Fhx of colon CA/polyps  []History of Polyps  []Barretts            []Melena  []Abnormal Imaging              []Dysphagia              []Persistent Pneumonia   []Anemia                            []Food Impaction        [x]History of Polyps  [] GI Bleed             []Pulmonary nodule/Mass   []Change in bowel habits []Heartburn/Reflux  []Rectal Bleed (BRBPR)  []Chest Pain - Non Cardiac []Heme (+) Stool []Ulcers  []Constipation  []Hemoptysis  []Varices  []Diarrhea  []Hypoxemia    []Nausea/Vomiting   [x]Screening   []Crohns/Colitis  []Other:     Anesthesia:   [x] MAC [] Moderate Sedation   [] General   [] None     ROS: 12 pt Review of Symptoms was negative unless mentioned above    Medications:   Prior to Admission medications    Medication Sig Start Date End Date Taking? Authorizing Provider   Cholecalciferol (VITAMIN D3) 50 MCG ( UT) CAPS Take 2,000 Units by mouth daily    Historical Provider, MD Yanez Cem Oil 300 MG CAPS Take 300 mg by mouth daily    Historical Provider, MD   atorvastatin (LIPITOR) 40 MG tablet Take 0.5 tablets by mouth daily 10/13/22   Genet Harry APRN - NP   metFORMIN (GLUCOPHAGE) 1000 MG tablet Take 1,000 mg by mouth in the morning and 1,000 mg in the evening. Take with meals.     Historical Provider, MD   aspirin 325 MG tablet Take 325 mg by mouth in the morning. Historical Provider, MD   Cyanocobalamin (VITAMIN B 12 PO) Take by mouth Twice a month    Historical Provider, MD   zinc gluconate 50 MG tablet Take 50 mg by mouth daily    Historical Provider, MD   losartan (COZAAR) 25 MG tablet Take 0.5 tablets by mouth daily 3/22/21   Jay Ortega MD   omeprazole (PRILOSEC) 20 MG delayed release capsule Take 20 mg by mouth daily  Patient not taking: No sig reported    Historical Provider, MD   Aspirin Buf,[id number], (BUFFERED ASPIRIN) 325 MG TABS Take 1 tablet by mouth once    Historical Provider, MD   Multiple Vitamins-Minerals (MULTIVITAMIN PO) Take  by mouth daily.     Historical Provider, MD       Past Medical History:  Past Medical History:   Diagnosis Date    CAD (coronary artery disease)     Chronic back pain     Diverticulitis     Hip pain     right    Hyperlipidemia     Hypertension     Knee pain     Shoulder pain     Type II or unspecified type diabetes mellitus without mention of complication, not stated as uncontrolled        Past Surgical History:  Past Surgical History:   Procedure Laterality Date    CORONARY ARTERY BYPASS GRAFT      [place] COLONOSCOPY W/BIOPSY SINGLE/MULTIPLE N/A 12/15/2017    Dr Radha MUÑIZ (-) dysplasia x 1, HP x 1, mucosa--5 yr recall    [place] EGD TRANSORAL BIOPSY SINGLE/MULTIPLE N/A 12/15/2017    Dr Windy Strong (-)    SHOULDER ARTHROSCOPY Left 3/16/2017    SHOULDER ARTHROSCOPIC SUBACROMIAL DECOMPRESSION, DISTAL CLAVICLE RESECTION, GLENUHUMERAL SYNOVECTOMY, OPEN ROTATOR CUFF REPAIR AND OPEN BICEPS TENODESIS LEFT  performed by Michelle Mae DO at [address]      age 9       Social History:  Social History     Tobacco Use    Smoking status: Former     Packs/day: 0.50     Years: 10.00     Pack years: 5.00     Types: Cigarettes     Quit date:      Years since quittin.9    Smokeless tobacco: Never   Vaping Use    Vaping Use: Never used   Substance Use Topics    Alcohol use: Not Currently    Drug use: Never       Vital Signs:   Vitals:    12/15/22 0738   BP: 136/70   Pulse: 56   Resp: 16   Temp: 97.9 °F (36.6 °C)   SpO2: 99%        Physical Exam:  Cardiac:  [x]WNL  []Comments:  Pulmonary:  [x]WNL   []Comments:  Neuro/Mental Status:  [x]WNL  []Comments:  Abdominal:  [x]WNL    []Comments:  Other:   []WNL  []Comments:    Informed Consent:  The risks and benefits of the procedure have been discussed with either the patient or if they cannot consent, their representative. Assessment:  Patient examined and appropriate for planned sedation and procedure. Plan:  Proceed with planned sedation and procedure as above.          Melanie Enriquez MD

## 2022-12-15 NOTE — OP NOTE
Patient: Brian Munoz. : 1945  Med Rec#: [id number] Acc#: [id number]   Primary Care Provider Joshua Gómez MD    Date of Procedure:  12/15/2022    Endoscopist: Timothy Paris MD, MD    Referring Provider: Joshua Gómez MD,     Operation Performed: Colonoscopy up to the terminal ileum with hot snare resection of polyps    Indications: History of colon polyps; needs colon cancer surveillance    Anesthesia:  Sedation was administered by anesthesia who monitored the patient during the procedure. I met with Brian Munoz. prior to procedure. We discussed the procedure itself, and I have discussed the risks of endoscopy (including-- but not limited to-- pain, discomfort, bleeding potentially requiring second endoscopic procedure and/or blood transfusion, organ perforation requiring operative repair, damage to organs near the colon, infection, aspiration, cardiopulmonary/allergic reaction), benefits, indications to endoscopy. Additionally, we discussed options other than colonoscopy. The patient expressed understanding. All questions answered. The patient decided to proceed with the procedure. Signed informed consent was placed on the chart. Blood Loss: minimal    Withdrawal time: More than 9 minutes  Bowel Prep: Fair  with small amounts of thick solid and semisolid stool and a moderate amount of thick, opaque liquid scattered in patchy segments throughout the colon obscuring the underlying mucosa. Lesions including polyps may have been missed. Complications: no immediate complications    DESCRIPTION OF PROCEDURE:     A time out was performed. After written informed consent was obtained, the patient was placed in the left lateral position. The perianal area was inspected, and a digital rectal exam was performed. A rectal exam was performed: normal tone, no palpable lesions.  At this point, a forward viewing Olympus colonoscope was inserted into the anus and carefully advanced to the terminal ileum. The cecum was identified by the ileocecal valve and the appendiceal orifice. The colonoscope was then slowly withdrawn with careful inspection of the mucosa in a linear and circumferential fashion. The scope was retroflexed in the rectum. Suction was utilized during the procedure to remove as much air as possible from the bowel. The colonoscope was removed from the patient, and the procedure was terminated. Findings are listed below. Findings:   2 sessile benign-appearing polyps 6-7  mm in diameter in the transverse colon were removed by hot snare polypectomy. NO larger polyps or masses or strictures or colitis. Suboptimal exam due to prep quality as described above. Moderate diverticulosis in the left colon  Where it was clearly visible, the mucosa appeared normal throughout the entire examined colon  Retroflexion in the rectum was otherwise normal and revealed no further abnormalities        Recommendations:  1. Repeat colonoscopy: pending pathology -in 5 years due to findings noted colonoscopy today and is less than optimal prep quality; sooner if he were to develop lower GI symptoms such as bleeding, abdominal pain, change in bowel habits or stool caliber or if the patient has anemia or unexplained weight loss in the future. 2. Await biopsy results-you will receive a letter with your results within 7-10 days    - Resume previous meds and diet  - GI clinic f/u PRN   - Keep scheduled f/u appts with other MDs     - NO ASA/NSAIDs x 2 weeks     Findings and recommendations were discussed w/ the patient. A copy of the images was provided.     (Please note that portions of this note were completed with a voice recognition program. Efforts were made to edit the dictations but occasionally words may be mis-transcribed.)     Marti Cooper MD, MD  12/15/2022  9:03 AM

## 2022-12-15 NOTE — ANESTHESIA PRE PROCEDURE
Department of Anesthesiology  Preprocedure Note       Name:  Malissa Salinas. Age:  65 y.o.  :  1945                                          MRN:  [id number]         Date:  12/15/2022      Surgeon: Marlen Javed):  Nicholas Reece MD    Procedure: Procedure(s):  COLORECTAL CANCER SCREENING, NOT HIGH RISK    Medications prior to admission:   Prior to Admission medications    Medication Sig Start Date End Date Taking? Authorizing Provider   Cholecalciferol (VITAMIN D3) 50 MCG (2000) CAPS Take 2,000 Units by mouth daily    Historical Provider, MD Em Chough Oil 300 MG CAPS Take 300 mg by mouth daily    Historical Provider, MD   atorvastatin (LIPITOR) 40 MG tablet Take 0.5 tablets by mouth daily 10/13/22   CARITO Robles NP   metFORMIN (GLUCOPHAGE) 1000 MG tablet Take 1,000 mg by mouth in the morning and 1,000 mg in the evening. Take with meals. Historical Provider, MD   aspirin 325 MG tablet Take 325 mg by mouth in the morning. Historical Provider, MD   Cyanocobalamin (VITAMIN B 12 PO) Take by mouth Twice a month    Historical Provider, MD   zinc gluconate 50 MG tablet Take 50 mg by mouth daily    Historical Provider, MD   losartan (COZAAR) 25 MG tablet Take 0.5 tablets by mouth daily 3/22/21   Debra Rendon MD   omeprazole (PRILOSEC) 20 MG delayed release capsule Take 20 mg by mouth daily  Patient not taking: No sig reported    Historical Provider, MD   Aspirin Buf,[id number]-WdLhm, (BUFFERED ASPIRIN) 325 MG TABS Take 1 tablet by mouth once    Historical Provider, MD   Multiple Vitamins-Minerals (MULTIVITAMIN PO) Take  by mouth daily. Historical Provider, MD       Current medications:    Current Facility-Administered Medications   Medication Dose Route Frequency Provider Last Rate Last Admin    lactated ringers infusion   IntraVENous Continuous Nicholas Reece  mL/hr at 12/15/22 08 NoRateChange at 12/15/22 08       Allergies:     Allergies   Allergen Reactions    Ace Inhibitors      Other reaction(s): Cough    Valium [Diazepam] Nausea Only       Problem List:    Patient Active Problem List   Diagnosis Code    Complete tear of left rotator cuff M75.122    Anemia D64.9    Chronic heartburn R12    Prostate cancer ([facility] [address] C61    Family history of colonic polyps Z83.71    Coronary artery disease involving native coronary artery I25.10    Status post aorto-coronary artery bypass graft Z95.1    Mixed hyperlipidemia E78.2    Hypoglycemia E16.2    Seizure-like activity ([facility] [address] R56.9    Elevated lactic acid level R79.89       Past Medical History:        Diagnosis Date    CAD (coronary artery disease)     Chronic back pain     Diverticulitis     Hip pain     right    Hyperlipidemia     Hypertension     Knee pain     Shoulder pain     Type II or unspecified type diabetes mellitus without mention of complication, not stated as uncontrolled        Past Surgical History:        Procedure Laterality Date    CORONARY ARTERY BYPASS GRAFT      MA COLONOSCOPY W/BIOPSY SINGLE/MULTIPLE N/A 12/15/2017    Dr Noe MUÑIZ (-) dysplasia x 1, HP x 1, mucosa--5 yr recall    MA EGD TRANSORAL BIOPSY SINGLE/MULTIPLE N/A 12/15/2017    Dr Gracie Mccartney (-)    SHOULDER ARTHROSCOPY Left 3/16/2017    SHOULDER ARTHROSCOPIC SUBACROMIAL DECOMPRESSION, DISTAL CLAVICLE RESECTION, GLENUHUMERAL SYNOVECTOMY, OPEN ROTATOR CUFF REPAIR AND OPEN BICEPS TENODESIS LEFT  performed by Devang Jay DO at [address] [place] [address] TONSILLECTOMY      age 9       Social History:    Social History     Tobacco Use    Smoking status: Former     Packs/day: 0.50     Years: 10.00     Pack years: 5.00     Types: Cigarettes     Quit date:      Years since quittin.9    Smokeless tobacco: Never   Substance Use Topics    Alcohol use: Not Currently                                Counseling given: Not Answered      Vital Signs (Current):   Vitals:    12/15/22 0738   BP: 136/70   Pulse: 56 Resp: 16   Temp: 97.9 °F (36.6 °C)   TempSrc: Temporal   SpO2: 99%   Weight: 170 lb (77.1 kg)   Height: 5' 6\" (1.676 m)                                              BP Readings from Last 3 Encounters:   12/15/22 136/70   10/13/22 116/64   07/21/22 (!) 151/68       NPO Status: Time of last liquid consumption: 0330                        Time of last solid consumption: 1800                        Date of last liquid consumption: 12/15/22                        Date of last solid food consumption: 12/13/22    BMI:   Wt Readings from Last 3 Encounters:   12/15/22 170 lb (77.1 kg)   10/13/22 173 lb 9.6 oz (78.7 kg)   07/20/22 185 lb (83.9 kg)     Body mass index is 27.44 kg/m².     CBC:   Lab Results   Component Value Date/Time    WBC 5.4 07/21/2022 03:22 AM    RBC 4.00 07/21/2022 03:22 AM    HGB 11.9 07/21/2022 03:22 AM    HCT 36.4 07/21/2022 03:22 AM    MCV 91.0 07/21/2022 03:22 AM    RDW 14.6 07/21/2022 03:22 AM     07/21/2022 03:22 AM       CMP:   Lab Results   Component Value Date/Time     07/21/2022 03:22 AM    K 4.2 07/21/2022 03:22 AM     07/21/2022 03:22 AM    CO2 21 07/21/2022 03:22 AM    BUN 22 07/21/2022 03:22 AM    CREATININE 0.8 07/21/2022 03:22 AM    GFRAA >59 07/21/2022 03:22 AM    LABGLOM >60 07/21/2022 03:22 AM    GLUCOSE 225 07/21/2022 03:22 AM    PROT 7.0 07/20/2022 02:25 PM    CALCIUM 8.8 07/21/2022 03:22 AM    BILITOT 0.3 07/20/2022 02:25 PM    ALKPHOS 75 07/20/2022 02:25 PM    AST 34 07/20/2022 02:25 PM    ALT 39 07/20/2022 02:25 PM       POC Tests:   Recent Labs     12/15/22  0745   POCGLU 110*       Coags: No results found for: PROTIME, INR, APTT    HCG (If Applicable): No results found for: PREGTESTUR, PREGSERUM, HCG, HCGQUANT     ABGs: No results found for: PHART, PO2ART, MEG3WCX, PLR2SUH, BEART, R2XWIUEN     Type & Screen (If Applicable):  No results found for: LABABO, LABRH    Drug/Infectious Status (If Applicable):  No results found for: HIV, HEPCAB    COVID-19 Screening (If Applicable):   Lab Results   Component Value Date/Time    COVID19 Not Detected 07/20/2022 07:17 PM           Anesthesia Evaluation  Patient summary reviewed  Airway: Mallampati: II  TM distance: >3 FB   Neck ROM: full  Mouth opening: < 3 FB   Dental: normal exam         Pulmonary:normal exam                              ROS comment: Remote history of smoking    Cardiovascular:  Exercise tolerance: no interval change,   (+) hypertension:, CAD:, CABG/stent (2007):, hyperlipidemia         Beta Blocker:  Not on Beta Blocker      ROS comment: Denies any CP or SOB     Neuro/Psych:   (+) seizures (? per chart):,             GI/Hepatic/Renal:   (+) GERD: well controlled, bowel prep,           Endo/Other:    (+) DiabetesType II DM, , malignancy/cancer (h/o prostate cancer). Abdominal:             Vascular: Other Findings:           Anesthesia Plan      general and TIVA     ASA 3       Induction: intravenous. Anesthetic plan and risks discussed with patient.                         CARITO Diallo - CRNA   12/15/2022

## 2022-12-30 ENCOUNTER — LAB (OUTPATIENT)
Dept: INTERNAL MEDICINE | Facility: CLINIC | Age: 77
End: 2022-12-30
Payer: MEDICARE

## 2022-12-30 ENCOUNTER — CLINICAL SUPPORT (OUTPATIENT)
Dept: INTERNAL MEDICINE | Facility: CLINIC | Age: 77
End: 2022-12-30

## 2022-12-30 DIAGNOSIS — E11.65 TYPE 2 DIABETES MELLITUS WITH HYPERGLYCEMIA, WITHOUT LONG-TERM CURRENT USE OF INSULIN: ICD-10-CM

## 2022-12-30 DIAGNOSIS — I10 PRIMARY HYPERTENSION: ICD-10-CM

## 2022-12-30 DIAGNOSIS — D51.0 PERNICIOUS ANEMIA: ICD-10-CM

## 2022-12-30 DIAGNOSIS — E78.2 MIXED HYPERLIPIDEMIA: Primary | ICD-10-CM

## 2022-12-30 PROCEDURE — 96372 THER/PROPH/DIAG INJ SC/IM: CPT

## 2022-12-30 RX ADMIN — CYANOCOBALAMIN 1000 MCG: 1000 INJECTION, SOLUTION INTRAMUSCULAR; SUBCUTANEOUS at 15:44

## 2022-12-31 LAB
BUN SERPL-MCNC: 27 MG/DL (ref 8–23)
BUN/CREAT SERPL: 30 (ref 7–25)
CALCIUM SERPL-MCNC: 9.6 MG/DL (ref 8.6–10.5)
CHLORIDE SERPL-SCNC: 102 MMOL/L (ref 98–107)
CHOLEST SERPL-MCNC: 354 MG/DL (ref 0–200)
CO2 SERPL-SCNC: 23.6 MMOL/L (ref 22–29)
CREAT SERPL-MCNC: 0.9 MG/DL (ref 0.76–1.27)
EGFRCR SERPLBLD CKD-EPI 2021: 87.4 ML/MIN/1.73
GLUCOSE SERPL-MCNC: 126 MG/DL (ref 65–99)
HBA1C MFR BLD: 7.3 % (ref 4.8–5.6)
HDLC SERPL-MCNC: 55 MG/DL (ref 40–60)
LDLC SERPL CALC-MCNC: 240 MG/DL (ref 0–100)
POTASSIUM SERPL-SCNC: 4.2 MMOL/L (ref 3.5–5.2)
SODIUM SERPL-SCNC: 138 MMOL/L (ref 136–145)
TRIGL SERPL-MCNC: 281 MG/DL (ref 0–150)
VLDLC SERPL CALC-MCNC: 59 MG/DL (ref 5–40)

## 2023-01-03 NOTE — PROGRESS NOTES
Hemoglobin A1c has improved compared to September, from 7.5 to 7.3. Cholesterol has gotten MUCH worse since Septermber. We can discuss this at his appointment tomorrow.

## 2023-01-04 ENCOUNTER — OFFICE VISIT (OUTPATIENT)
Dept: INTERNAL MEDICINE | Facility: CLINIC | Age: 78
End: 2023-01-04
Payer: MEDICARE

## 2023-01-04 VITALS
BODY MASS INDEX: 27.32 KG/M2 | SYSTOLIC BLOOD PRESSURE: 138 MMHG | OXYGEN SATURATION: 99 % | WEIGHT: 170 LBS | HEART RATE: 80 BPM | HEIGHT: 66 IN | TEMPERATURE: 96.8 F | DIASTOLIC BLOOD PRESSURE: 84 MMHG

## 2023-01-04 DIAGNOSIS — I10 PRIMARY HYPERTENSION: ICD-10-CM

## 2023-01-04 DIAGNOSIS — Z95.1 STATUS POST AORTO-CORONARY ARTERY BYPASS GRAFT: ICD-10-CM

## 2023-01-04 DIAGNOSIS — I73.00 RAYNAUD'S PHENOMENON WITHOUT GANGRENE: ICD-10-CM

## 2023-01-04 DIAGNOSIS — I25.10 CORONARY ARTERY DISEASE INVOLVING NATIVE CORONARY ARTERY OF NATIVE HEART WITHOUT ANGINA PECTORIS: Primary | ICD-10-CM

## 2023-01-04 DIAGNOSIS — E11.9 DIABETES MELLITUS TYPE 2, NONINSULIN DEPENDENT: ICD-10-CM

## 2023-01-04 DIAGNOSIS — E78.2 MIXED HYPERLIPIDEMIA: ICD-10-CM

## 2023-01-04 PROCEDURE — 1159F MED LIST DOCD IN RCRD: CPT | Performed by: NURSE PRACTITIONER

## 2023-01-04 PROCEDURE — 3075F SYST BP GE 130 - 139MM HG: CPT | Performed by: NURSE PRACTITIONER

## 2023-01-04 PROCEDURE — 99214 OFFICE O/P EST MOD 30 MIN: CPT | Performed by: NURSE PRACTITIONER

## 2023-01-04 PROCEDURE — 1160F RVW MEDS BY RX/DR IN RCRD: CPT | Performed by: NURSE PRACTITIONER

## 2023-01-04 PROCEDURE — 3079F DIAST BP 80-89 MM HG: CPT | Performed by: NURSE PRACTITIONER

## 2023-01-04 NOTE — PROGRESS NOTES
Subjective     Chief Complaint:  Cold fingertips    HPI:  The patient presents to the office today for a follow-up visit.  His hemoglobin A1c has improved from 7.5 in September to 7.3.  He is compliant with taking medications and does try to check his blood glucose fasting every morning.  He states this typically runs between 110 and 120.  He had previous luck with weight loss and controlling blood glucoses with intermittent fasting and is going to try to start doing this again.    Blood pressure is 138/84 in the office today.  He has not taken his medications yet this morning.  He does not typically monitor his blood pressure at home.  He denies any chest pain, shortness of breath, dizziness/lightheadedness or headaches.  The patient had discussed with cardiology at an office visit in October about decreasing Lipitor dosage as he does have muscle cramping at times with the 40 mg dosing.  He has been taking 20 mg and reports he has not noticed muscle cramping.  His lipid panel has gotten much worse on the lower dosage of this medication.    The patient also indicates that in colder weather the tips of both of his index fingers become very cold and turn white.  He does indicate that he played hockey as a child and he believes he got frostbite to his hands.  He does not typically have numbness or tingling with this.  He does try to wear gloves when it is cold.    Patient's PMR from outside medical facility reviewed and noted.    Past Medical History:   Past Medical History:   Diagnosis Date   • Back pain    • Coronary artery disease    • Diabetes mellitus (HCC)    • Hearing deficit    • Hypertension    • Prostate cancer (HCC)      Past Surgical History:  Past Surgical History:   Procedure Laterality Date   • CORONARY ARTERY BYPASS GRAFT     • ROTATOR CUFF REPAIR Left 03/15/2017     Social History:  reports that he quit smoking about 47 years ago. He has a 6.00 pack-year smoking history. He has never used  smokeless tobacco. He reports that he does not drink alcohol and does not use drugs.    Family History: family history includes Developmental Disability in his father; Heart attack in his father; No Known Problems in his mother.      Allergies:  Allergies   Allergen Reactions   • Ace Inhibitors Cough   • Diazepam Nausea Only     Medications:  Prior to Admission medications    Medication Sig Start Date End Date Taking? Authorizing Provider   Accu-Chek Softclix Lancets lancets 1 each by Other route 2 (two) times a day. Use as instructed 11/16/20  Yes Bridget Corrales APRN   aspirin 325 MG tablet Take 325 mg by mouth Daily.   Yes Gui Hicks MD   atorvastatin (LIPITOR) 40 MG tablet Take 1 tablet by mouth Daily. 8/8/22  Yes Rylie Snow APRN   Blood Glucose Monitoring Suppl (Accu-Chek Guide Me) w/Device kit USE 1  TO CHECK GLUCOSE THREE TIMES DAILY AS NEEDED BEFORE MEAL(S) 10/18/21  Yes Bridget Corrales APRN   Cholecalciferol (VITAMIN D3 PO) Take  by mouth.   Yes Gui Hicks MD   Collagen-Vitamin C-Biotin (COLLAGEN 1500/C PO) Take  by mouth.   Yes Gui Hicks MD   glucose blood (Accu-Chek Tram Plus) test strip 1 each by Other route 2 (Two) Times a Day. Use as instructed 7/25/22  Yes Rylie Snow APRN   glucose monitor monitoring kit 1 each 2 (Two) Times a Day. 8/24/20  Yes Bridget Corrales APRN   KRILL OIL PO Take 1 capsule by mouth Daily.   Yes Gui Hicks MD   metFORMIN (GLUCOPHAGE) 1000 MG tablet Take 1 tablet by mouth 2 (Two) Times a Day With Meals. 8/8/22  Yes Rylie Snow APRN   Multiple Vitamins-Minerals (MULTIVITAMIN MEN) tablet Take 1 tablet by mouth Daily.   Yes Gui Hicks MD   sildenafil (VIAGRA) 100 MG tablet Take 1 tablet by mouth Daily As Needed for Erectile Dysfunction. 8/26/22  Yes Rylie Snow APRN   zinc gluconate 50 MG tablet Take 50 mg by mouth Daily.   Yes Gui Hicks MD    losartan (COZAAR) 50 MG tablet Take 1 tablet by mouth Daily. 8/8/22   Rylie Snow APRN   vitamin D (ERGOCALCIFEROL) 1.25 MG (68694 UT) capsule capsule Take 1 capsule by mouth 1 (One) Time Per Week. 8/8/22   Rylie Snow APRN       Objective     Vital Signs: /84 (BP Location: Left arm, Patient Position: Sitting, Cuff Size: Adult)   Pulse 80   Temp 96.8 °F (36 °C) (Temporal)   Ht 167.6 cm (66\")   Wt 77.1 kg (170 lb)   SpO2 99%   BMI 27.44 kg/m²   Physical Exam  Vitals and nursing note reviewed.   Constitutional:       General: He is not in acute distress.     Appearance: He is not ill-appearing or toxic-appearing.   HENT:      Head: Normocephalic and atraumatic.      Mouth/Throat:      Mouth: Mucous membranes are moist.      Pharynx: Oropharynx is clear.   Cardiovascular:      Rate and Rhythm: Normal rate and regular rhythm.      Pulses: Normal pulses.      Heart sounds: Normal heart sounds.   Pulmonary:      Effort: Pulmonary effort is normal.      Breath sounds: No wheezing, rhonchi or rales.   Abdominal:      General: Bowel sounds are normal. There is no distension.      Palpations: Abdomen is soft.      Tenderness: There is no abdominal tenderness.   Musculoskeletal:         General: No swelling or tenderness. Normal range of motion.      Cervical back: Normal range of motion and neck supple. No tenderness.   Skin:     General: Skin is warm and dry.      Findings: No erythema or rash.   Neurological:      General: No focal deficit present.      Mental Status: He is alert and oriented to person, place, and time.   Psychiatric:         Mood and Affect: Mood normal.         Behavior: Behavior normal.         Thought Content: Thought content normal.         Judgment: Judgment normal.       BMI is >= 25 and <30. (Overweight) The following options were offered after discussion;: exercise counseling/recommendations and nutrition counseling/recommendations    Results Reviewed:  Reviewed  cardiology office visit note from [facility] 10/13/2022.  Reviewed last office visit note from 9/12/2022.  Reviewed labs from 12/30/2022-BMP, hemoglobin A1c, lipid panel    Assessment / Plan     Assessment/Plan:  Diagnoses and all orders for this visit:    1. Coronary artery disease involving native coronary artery of native heart without angina pectoris (Primary)    2. Mixed hyperlipidemia    3. Status post aorto-coronary artery bypass graft    4. Primary hypertension    5. Diabetes mellitus type 2, noninsulin dependent (HCC)    6. Raynaud's phenomenon without gangrene       The patient presents to the office today for a follow-up.  Diabetes is reasonably well controlled with hemoglobin A1c of 7.3, improved from September at 7.5.  Will continue present regimen with metformin.  Patient indicates that he is going to try to do some form of intermittent fasting, which has helped him lose weight in the past.  He also plans to join a gym to start exercising on a regular basis.    The patient's lipid panel is very poorly controlled.  His total cholesterol has gone from 164-354, LDL from  and triglycerides from  all in a matter of 3 months.  He did lower his Lipitor dosage from 40 mg to 20 mg in October and does indicate that he has been compliant with this.  He will need to increase this back to 40 mg.  I have asked him to let us know if he is unable to tolerate the muscle cramping that he has had in the past with the 40 mg dosing at which point we may consider placing him on high-dose Crestor.  He does have an upcoming appointment with cardiology this month.    Blood pressure in the office today is 138/84.  He has not had his medication this morning.  Continue present regimen with losartan.  Renal function and electrolytes are well controlled.    The patient does describe discoloration and coldness to his fingertips only with cold weather.  Likely Raynaud's phenomenon.  Information has been provided in the  after visit summary and we did discuss this as well.    Return in about 3 months (around 4/4/2023) for Recheck- needs fasting lipid panel. unless patient needs to be seen sooner or acute issues arise.    I have discussed the patient results/orders and and plan/recommendation with them at today's visit.      Rylie Snow, LISSET   01/04/2023

## 2023-01-06 ENCOUNTER — TELEPHONE (OUTPATIENT)
Dept: INTERNAL MEDICINE | Facility: CLINIC | Age: 78
End: 2023-01-06

## 2023-01-06 DIAGNOSIS — E11.65 TYPE 2 DIABETES MELLITUS WITH HYPERGLYCEMIA, WITHOUT LONG-TERM CURRENT USE OF INSULIN: ICD-10-CM

## 2023-01-06 RX ORDER — BLOOD-GLUCOSE METER
1 EACH MISCELLANEOUS 3 TIMES DAILY
Qty: 1 KIT | Refills: 0 | Status: SHIPPED | OUTPATIENT
Start: 2023-01-06

## 2023-01-06 NOTE — TELEPHONE ENCOUNTER
Caller: Michael Novak    Relationship: Self    Best call back number: [phone]    What medication are you requesting: ACCU-CHEK SELF CLICK    If a prescription is needed, what is your preferred pharmacy and phone number: [facility] [address] [place] [address] [address] [facility] [phone] [facility] [phone]      Additional notes: PATIENT STATES HIS DOG ATE HIS PREVIOUS DEVICE.

## 2023-01-13 ENCOUNTER — CLINICAL SUPPORT (OUTPATIENT)
Dept: INTERNAL MEDICINE | Facility: CLINIC | Age: 78
End: 2023-01-13
Payer: MEDICARE

## 2023-01-13 DIAGNOSIS — D51.0 PERNICIOUS ANEMIA: ICD-10-CM

## 2023-01-13 PROCEDURE — 96372 THER/PROPH/DIAG INJ SC/IM: CPT

## 2023-01-13 RX ADMIN — CYANOCOBALAMIN 1000 MCG: 1000 INJECTION, SOLUTION INTRAMUSCULAR; SUBCUTANEOUS at 07:58

## 2023-01-20 DIAGNOSIS — E11.65 TYPE 2 DIABETES MELLITUS WITH HYPERGLYCEMIA, UNSPECIFIED WHETHER LONG TERM INSULIN USE: ICD-10-CM

## 2023-01-20 RX ORDER — LANCETS
1 EACH MISCELLANEOUS 2 TIMES DAILY
Qty: 200 EACH | Refills: 3 | Status: SHIPPED | OUTPATIENT
Start: 2023-01-20

## 2023-02-03 ENCOUNTER — CLINICAL SUPPORT (OUTPATIENT)
Dept: INTERNAL MEDICINE | Facility: CLINIC | Age: 78
End: 2023-02-03
Payer: MEDICARE

## 2023-02-03 DIAGNOSIS — D51.0 PERNICIOUS ANEMIA: ICD-10-CM

## 2023-02-03 PROCEDURE — 96372 THER/PROPH/DIAG INJ SC/IM: CPT

## 2023-02-03 RX ADMIN — CYANOCOBALAMIN 1000 MCG: 1000 INJECTION, SOLUTION INTRAMUSCULAR; SUBCUTANEOUS at 07:59

## 2023-02-15 ENCOUNTER — CLINICAL SUPPORT (OUTPATIENT)
Dept: INTERNAL MEDICINE | Facility: CLINIC | Age: 78
End: 2023-02-15
Payer: MEDICARE

## 2023-02-15 DIAGNOSIS — D51.0 PERNICIOUS ANEMIA: ICD-10-CM

## 2023-02-15 PROCEDURE — 96372 THER/PROPH/DIAG INJ SC/IM: CPT | Performed by: INTERNAL MEDICINE

## 2023-02-15 RX ADMIN — CYANOCOBALAMIN 1000 MCG: 1000 INJECTION, SOLUTION INTRAMUSCULAR; SUBCUTANEOUS at 08:53

## 2023-03-01 ENCOUNTER — CLINICAL SUPPORT (OUTPATIENT)
Dept: INTERNAL MEDICINE | Facility: CLINIC | Age: 78
End: 2023-03-01
Payer: MEDICARE

## 2023-03-01 DIAGNOSIS — D51.0 PERNICIOUS ANEMIA: Primary | ICD-10-CM

## 2023-03-01 PROCEDURE — 96372 THER/PROPH/DIAG INJ SC/IM: CPT | Performed by: INTERNAL MEDICINE

## 2023-03-01 RX ADMIN — CYANOCOBALAMIN 1000 MCG: 1000 INJECTION, SOLUTION INTRAMUSCULAR; SUBCUTANEOUS at 09:56

## 2023-03-15 ENCOUNTER — CLINICAL SUPPORT (OUTPATIENT)
Dept: INTERNAL MEDICINE | Facility: CLINIC | Age: 78
End: 2023-03-15
Payer: MEDICARE

## 2023-03-15 DIAGNOSIS — D51.0 PERNICIOUS ANEMIA: ICD-10-CM

## 2023-03-15 PROCEDURE — 96372 THER/PROPH/DIAG INJ SC/IM: CPT | Performed by: NURSE PRACTITIONER

## 2023-03-15 RX ADMIN — CYANOCOBALAMIN 1000 MCG: 1000 INJECTION, SOLUTION INTRAMUSCULAR; SUBCUTANEOUS at 07:50

## 2023-03-31 ENCOUNTER — CLINICAL SUPPORT (OUTPATIENT)
Dept: INTERNAL MEDICINE | Facility: CLINIC | Age: 78
End: 2023-03-31
Payer: MEDICARE

## 2023-03-31 DIAGNOSIS — D51.0 PERNICIOUS ANEMIA: ICD-10-CM

## 2023-03-31 RX ADMIN — CYANOCOBALAMIN 1000 MCG: 1000 INJECTION, SOLUTION INTRAMUSCULAR; SUBCUTANEOUS at 08:09

## 2023-04-05 ENCOUNTER — OFFICE VISIT (OUTPATIENT)
Dept: INTERNAL MEDICINE | Facility: CLINIC | Age: 78
End: 2023-04-05
Payer: MEDICARE

## 2023-04-05 VITALS
DIASTOLIC BLOOD PRESSURE: 82 MMHG | TEMPERATURE: 97.1 F | WEIGHT: 173 LBS | OXYGEN SATURATION: 99 % | BODY MASS INDEX: 27.8 KG/M2 | HEIGHT: 66 IN | SYSTOLIC BLOOD PRESSURE: 122 MMHG | HEART RATE: 61 BPM

## 2023-04-05 DIAGNOSIS — E11.65 TYPE 2 DIABETES MELLITUS WITH HYPERGLYCEMIA, WITHOUT LONG-TERM CURRENT USE OF INSULIN: Primary | ICD-10-CM

## 2023-04-05 DIAGNOSIS — I10 PRIMARY HYPERTENSION: ICD-10-CM

## 2023-04-05 DIAGNOSIS — E78.2 MIXED HYPERLIPIDEMIA: ICD-10-CM

## 2023-04-05 LAB — HBA1C MFR BLD: 7.5 %

## 2023-04-05 NOTE — PROGRESS NOTES
Subjective     Chief Complaint:  Diabetes    HPI:  The patient presents to the office today for a 3-month follow-up.  He states that he is feeling well and denies any acute complaints.  He does tell me that yesterday he checked his blood glucose 5 times within 5 minutes on different fingers each time and this varied from the low 100s to 130s.  He does try to check his blood glucose fasting each morning and this typically runs between 100-118.  He is compliant with metformin twice daily.  He states he has continued to intermittent fast and is on a carnivore diet where he typically only eats meat and animal products.  He tries to keep his carbohydrate count under 100 g for the day.  He does not eat fruit or vegetables.  The patient was previously on glyburide with metformin however this caused episode of hypoglycemia in July and the patient discontinued this.  He does not believe that he has ever taken any other medications besides glyburide and metformin.    Patient denies any chest pain, shortness of breath, dizziness/lightheadedness or headaches.  He does not monitor his blood pressure at home.  This is well controlled in the office today 122/82.  He tells me that since his last office visit, he has decreased his Lipitor dosage once again due to minor muscle aches.  We had increased this back to 40 mg as his cholesterol panel was very poorly controlled.    Patient's PMR from outside medical facility reviewed and noted.    Past Medical History:   Past Medical History:   Diagnosis Date   • Back pain    • Coronary artery disease    • Diabetes mellitus    • Hearing deficit    • Hypertension    • Prostate cancer      Past Surgical History:  Past Surgical History:   Procedure Laterality Date   • CORONARY ARTERY BYPASS GRAFT     • ROTATOR CUFF REPAIR Left 03/15/2017     Social History:  reports that he quit smoking about 47 years ago. He has a 6.00 pack-year smoking history. He has never used smokeless tobacco. He  reports that he does not drink alcohol and does not use drugs.    Family History: family history includes Developmental Disability in his father; Heart attack in his father; No Known Problems in his mother.      Allergies:  Allergies   Allergen Reactions   • Ace Inhibitors Cough   • Diazepam Nausea Only     Medications:  Prior to Admission medications    Medication Sig Start Date End Date Taking? Authorizing Provider   Accu-Chek Softclix Lancets lancets 1 each by Other route 2 (Two) Times a Day. Use as instructed 1/20/23  Yes Rylie Snow APRN   aspirin 325 MG tablet Take 1 tablet by mouth Daily.   Yes Gui Hicks MD   atorvastatin (LIPITOR) 40 MG tablet Take 1 tablet by mouth Daily.  Patient taking differently: Take 20 mg by mouth Daily. 8/8/22  Yes Rylie Snow APRN   Blood Glucose Monitoring Suppl (Accu-Chek Guide Me) w/Device kit 1 kit 3 (Three) Times a Day. 1/6/23  Yes Peterson Morales MD   Cholecalciferol (VITAMIN D3 PO) Take  by mouth.   Yes Gui Hicks MD   Collagen-Vitamin C-Biotin (COLLAGEN 1500/C PO) Take  by mouth.   Yes Gui Hicks MD   glucose blood (Accu-Chek Tram Plus) test strip 1 each by Other route 2 (Two) Times a Day. Use as instructed 7/25/22  Yes Rylie Snow APRN   glucose monitor monitoring kit 1 each 2 (Two) Times a Day. 8/24/20  Yes Bridget Corrales APRN   losartan (COZAAR) 50 MG tablet Take 1 tablet by mouth Daily. 8/8/22  Yes Rylie Snow APRN   metFORMIN (GLUCOPHAGE) 1000 MG tablet Take 1 tablet by mouth 2 (Two) Times a Day With Meals. 8/8/22  Yes Rylie Snow APRN   sildenafil (VIAGRA) 100 MG tablet Take 1 tablet by mouth Daily As Needed for Erectile Dysfunction. 8/26/22  Yes Rylie Snow APRN   zinc gluconate 50 MG tablet Take 1 tablet by mouth Daily.   Yes Gui Hicks MD   KRILL OIL PO Take 1 capsule by mouth Daily.    Gui Hicks MD   Multiple Vitamins-Minerals (MULTIVITAMIN  "MEN) tablet Take 1 tablet by mouth Daily.    Provider, MD Gui   vitamin D (ERGOCALCIFEROL) 1.25 MG (69834 UT) capsule capsule Take 1 capsule by mouth 1 (One) Time Per Week.  Patient not taking: Reported on 4/5/2023 8/8/22   Rylie Snow LISSET PORRAS       Objective     Vital Signs: /82 (BP Location: Left arm, Patient Position: Sitting, Cuff Size: Adult)   Pulse 61   Temp 97.1 °F (36.2 °C) (Temporal)   Ht 167.6 cm (66\")   Wt 78.5 kg (173 lb)   SpO2 99%   BMI 27.92 kg/m²   Physical Exam  Vitals and nursing note reviewed.   Constitutional:       General: He is not in acute distress.     Appearance: He is not ill-appearing or toxic-appearing.   HENT:      Head: Normocephalic and atraumatic.      Mouth/Throat:      Mouth: Mucous membranes are moist.      Pharynx: Oropharynx is clear.   Cardiovascular:      Rate and Rhythm: Normal rate and regular rhythm.      Pulses: Normal pulses.      Heart sounds: Normal heart sounds.   Pulmonary:      Effort: Pulmonary effort is normal.      Breath sounds: No wheezing, rhonchi or rales.   Abdominal:      General: Bowel sounds are normal. There is no distension.      Palpations: Abdomen is soft.      Tenderness: There is no abdominal tenderness.   Musculoskeletal:         General: No swelling or tenderness. Normal range of motion.      Cervical back: Normal range of motion and neck supple. No tenderness.   Skin:     General: Skin is warm and dry.      Findings: No erythema or rash.   Neurological:      General: No focal deficit present.      Mental Status: He is alert and oriented to person, place, and time.   Psychiatric:         Mood and Affect: Mood normal.         Behavior: Behavior normal.         Thought Content: Thought content normal.         Judgment: Judgment normal.       BMI is >= 25 and <30. (Overweight) The following options were offered after discussion;: exercise counseling/recommendations and nutrition counseling/recommendations    Results " "Reviewed:  Hemoglobin A1c in the office today 7.5%.    Assessment / Plan     Assessment/Plan:  Diagnoses and all orders for this visit:    1. Type 2 diabetes mellitus with hyperglycemia, without long-term current use of insulin (Primary)  -     POC Glycated Hemoglobin, Total    2. Mixed hyperlipidemia  -     Lipid panel    3. Primary hypertension  -     Comprehensive metabolic panel       Patient presents to the office today for 3-month follow-up.  His previous A1c was 7.3 and today is 7.5.  He tells me that he has not changed his diet in the last 3 months.  He continues to follow intermittent fasting plan with a \"carnivore\" diet where he eats mostly meat and animal products.  He does try to keep his total carbohydrate count under 100 g for the day.  He does not eat fruit or vegetables.  He will continue on metformin 1000 mg twice daily at this time.  He was previously taking glyburide which caused an episode of hypoglycemia in July and he has since stopped this medication.  He does not wish to add any medication to his regimen at this time as he is hopeful to be able to discontinue all his medicines at some point.  As he does prefer a more naturalistic and homeopathic approach to his health, I did suggest to use of supplements fenugreek and/or milk thistle to help with blood sugar regulation.  He is already utilizing cinnamon.    At the patient's last office visit, his lipid panel was noted to be very poorly controlled with total cholesterol 354, LDL of 240 and triglycerides of 281.  This had been better controlled on 40 mg dosing of Lipitor, so we increased his dosing back to this from 20 mg.  He has since decreased this back down to 20 mg due to minor muscle aches/cramps.  We will reassess lipid panel and CMP today.    Blood pressure is well controlled in the office today at 122/82.  We will continue present regimen with losartan 50 mg daily.  The patient is hopeful that he will be able to discontinue all of his " prescription medication in the future.  For now, would like to continue medication.  He is to continue regular physical activity.    Return in about 3 months (around 7/5/2023) for Recheck- A1c. unless patient needs to be seen sooner or acute issues arise.    I have discussed the patient results/orders and and plan/recommendation with them at today's visit.      Rylie Snow, LISSET   04/05/2023

## 2023-04-06 LAB
ALBUMIN SERPL-MCNC: 5 G/DL (ref 3.5–5.2)
ALBUMIN/GLOB SERPL: 2.3 G/DL
ALP SERPL-CCNC: 65 U/L (ref 39–117)
ALT SERPL-CCNC: 22 U/L (ref 1–41)
AST SERPL-CCNC: 21 U/L (ref 1–40)
BILIRUB SERPL-MCNC: 0.5 MG/DL (ref 0–1.2)
BUN SERPL-MCNC: 33 MG/DL (ref 8–23)
BUN/CREAT SERPL: 38.8 (ref 7–25)
CALCIUM SERPL-MCNC: 9.5 MG/DL (ref 8.6–10.5)
CHLORIDE SERPL-SCNC: 103 MMOL/L (ref 98–107)
CHOLEST SERPL-MCNC: 197 MG/DL (ref 0–200)
CO2 SERPL-SCNC: 21 MMOL/L (ref 22–29)
CREAT SERPL-MCNC: 0.85 MG/DL (ref 0.76–1.27)
EGFRCR SERPLBLD CKD-EPI 2021: 88.9 ML/MIN/1.73
GLOBULIN SER CALC-MCNC: 2.2 GM/DL
GLUCOSE SERPL-MCNC: 123 MG/DL (ref 65–99)
HDLC SERPL-MCNC: 59 MG/DL (ref 40–60)
LDLC SERPL CALC-MCNC: 116 MG/DL (ref 0–100)
POTASSIUM SERPL-SCNC: 4 MMOL/L (ref 3.5–5.2)
PROT SERPL-MCNC: 7.2 G/DL (ref 6–8.5)
SODIUM SERPL-SCNC: 137 MMOL/L (ref 136–145)
TRIGL SERPL-MCNC: 122 MG/DL (ref 0–150)
VLDLC SERPL CALC-MCNC: 22 MG/DL (ref 5–40)

## 2023-04-06 NOTE — PROGRESS NOTES
Cholesterol actually looks much better compared to 3 months ago. Ok to continue the 20 mg dosage of Lipitor. Liver function testing normal. Patient may have been slightly dehydrated at time labs were drawn so would encourage drinking at least 64 oz. Of water per day.

## 2023-04-14 ENCOUNTER — CLINICAL SUPPORT (OUTPATIENT)
Dept: INTERNAL MEDICINE | Facility: CLINIC | Age: 78
End: 2023-04-14
Payer: MEDICARE

## 2023-04-14 RX ADMIN — CYANOCOBALAMIN 1000 MCG: 1000 INJECTION, SOLUTION INTRAMUSCULAR; SUBCUTANEOUS at 07:30

## 2023-05-01 ENCOUNTER — CLINICAL SUPPORT (OUTPATIENT)
Dept: INTERNAL MEDICINE | Facility: CLINIC | Age: 78
End: 2023-05-01
Payer: MEDICARE

## 2023-05-01 DIAGNOSIS — D51.0 PERNICIOUS ANEMIA: Primary | ICD-10-CM

## 2023-05-01 PROCEDURE — 96372 THER/PROPH/DIAG INJ SC/IM: CPT | Performed by: NURSE PRACTITIONER

## 2023-05-01 RX ADMIN — CYANOCOBALAMIN 1000 MCG: 1000 INJECTION, SOLUTION INTRAMUSCULAR; SUBCUTANEOUS at 07:50

## 2023-05-15 ENCOUNTER — CLINICAL SUPPORT (OUTPATIENT)
Dept: INTERNAL MEDICINE | Facility: CLINIC | Age: 78
End: 2023-05-15
Payer: MEDICARE

## 2023-05-15 DIAGNOSIS — D51.0 PERNICIOUS ANEMIA: Primary | ICD-10-CM

## 2023-05-15 PROCEDURE — 96372 THER/PROPH/DIAG INJ SC/IM: CPT | Performed by: NURSE PRACTITIONER

## 2023-05-15 RX ADMIN — CYANOCOBALAMIN 1000 MCG: 1000 INJECTION, SOLUTION INTRAMUSCULAR; SUBCUTANEOUS at 10:02

## 2023-06-05 ENCOUNTER — CLINICAL SUPPORT (OUTPATIENT)
Dept: INTERNAL MEDICINE | Facility: CLINIC | Age: 78
End: 2023-06-05
Payer: MEDICARE

## 2023-06-05 DIAGNOSIS — D51.0 PERNICIOUS ANEMIA: Primary | ICD-10-CM

## 2023-06-05 PROCEDURE — 96372 THER/PROPH/DIAG INJ SC/IM: CPT | Performed by: NURSE PRACTITIONER

## 2023-06-05 RX ADMIN — CYANOCOBALAMIN 1000 MCG: 1000 INJECTION, SOLUTION INTRAMUSCULAR; SUBCUTANEOUS at 08:02

## 2023-06-16 ENCOUNTER — CLINICAL SUPPORT (OUTPATIENT)
Dept: INTERNAL MEDICINE | Facility: CLINIC | Age: 78
End: 2023-06-16
Payer: MEDICARE

## 2023-06-16 DIAGNOSIS — D51.0 PERNICIOUS ANEMIA: Primary | ICD-10-CM

## 2023-06-16 PROCEDURE — 96372 THER/PROPH/DIAG INJ SC/IM: CPT

## 2023-06-16 RX ADMIN — CYANOCOBALAMIN 1000 MCG: 1000 INJECTION, SOLUTION INTRAMUSCULAR; SUBCUTANEOUS at 07:44

## 2023-11-09 DIAGNOSIS — E11.649 TYPE 2 DIABETES MELLITUS WITH HYPOGLYCEMIA WITHOUT COMA, WITHOUT LONG-TERM CURRENT USE OF INSULIN: ICD-10-CM

## 2023-11-10 NOTE — PROGRESS NOTES
Problem: Chronic Conditions and Co-morbidities  Goal: Patient's chronic conditions and co-morbidity symptoms are monitored and maintained or improved  Outcome: Progressing     Problem: Safety - Adult  Goal: Free from fall injury  Outcome: Progressing [facility]   Established Patient Office Visit   [facility] [address] [facility] ([place]  [phone]        OFFICE VISIT:  10/13/2022    Hernan Coe - : 1945    Reason For Visit:  Len Braden is a 65 y.o. male who is here for 6 Month Follow-Up    1. Coronary artery disease involving native coronary artery of native heart without angina pectoris    2. Dyslipidemia    3. Essential hypertension        Patient with a history of coronary artery disease/CABG, hyperlipidemia and hypertension. He is a patient of Dr. Wes Beltran. Patient denies any chest pain, pressure or tightness. There is no shortness of breath, orthopnea or PND. Patient denies any lightheadedness, dizziness or syncope. Patient's main complaint is some muscle cramping from the atorvastatin. He is currently on 40 mg a day and would like to try and go down to 20 mg a day. Subjective    Hernan Coe is a 65 y.o. male with the following history as recorded in [facility]:    Patient Active Problem List    Diagnosis Date Noted    Coronary artery disease involving native coronary artery 2021    Status post aorto-coronary artery bypass graft 2021    Mixed hyperlipidemia 2021    Hypoglycemia 2022    Seizure-like activity ([facility] [address] 2022    Elevated lactic acid level 2022    Family history of colonic polyps 12/15/2017    Prostate cancer ([facility] [address] 11/10/2017    Anemia 2017    Chronic heartburn 2017    Complete tear of left rotator cuff 2017     Current Outpatient Medications   Medication Sig Dispense Refill    Cholecalciferol (VITAMIN D3) 50 MCG ( UT) CAPS Take 2,000 Units by mouth daily      Krill Oil 300 MG CAPS Take 300 mg by mouth daily      atorvastatin (LIPITOR) 40 MG tablet Take 0.5 tablets by mouth daily 30 tablet 3    metFORMIN (GLUCOPHAGE) 1000 MG tablet Take 1,000 mg by mouth in the morning and 1,000 mg in the evening. Take with meals. aspirin 325 MG tablet Take 325 mg by mouth in the morning. Cyanocobalamin (VITAMIN B 12 PO) Take by mouth Twice a month      zinc gluconate 50 MG tablet Take 50 mg by mouth daily      losartan (COZAAR) 25 MG tablet Take 0.5 tablets by mouth daily 90 tablet 3    Multiple Vitamins-Minerals (MULTIVITAMIN PO) Take  by mouth daily. omeprazole (PRILOSEC) 20 MG delayed release capsule Take 20 mg by mouth daily (Patient not taking: Reported on 10/13/2022)      Aspirin Buf,DlJtu-SyEaw-DaXqe, (BUFFERED ASPIRIN) 325 MG TABS Take 1 tablet by mouth once       No current facility-administered medications for this visit.      Allergies: Ace inhibitors and Valium [diazepam]  Past Medical History:   Diagnosis Date    CAD (coronary artery disease)     Chronic back pain     Diverticulitis     Hip pain     right    Hyperlipidemia     Hypertension     Knee pain     Shoulder pain     Type II or unspecified type diabetes mellitus without mention of complication, not stated as uncontrolled      Past Surgical History:   Procedure Laterality Date    CORONARY ARTERY BYPASS GRAFT      HI COLONOSCOPY W/BIOPSY SINGLE/MULTIPLE N/A 12/15/2017    Dr Marino MUÑIZ (-) dysplasia x 1, HP x 1, mucosa--5 yr recall    HI EGD TRANSORAL BIOPSY SINGLE/MULTIPLE N/A 12/15/2017    Dr Rabia Meza (-)    SHOULDER ARTHROSCOPY Left 3/16/2017    SHOULDER ARTHROSCOPIC SUBACROMIAL DECOMPRESSION, DISTAL CLAVICLE RESECTION, GLENUHUMERAL SYNOVECTOMY, OPEN ROTATOR CUFF REPAIR AND OPEN BICEPS TENODESIS LEFT  performed by Rosa Mendez DO at [address]      age 9     Family History   Problem Relation Age of Onset    Cancer Mother     Heart Disease Father     Colon Cancer Neg Hx     Colon Polyps Neg Hx     Liver Cancer Neg Hx     Liver Disease Neg Hx     Esophageal Cancer Neg Hx     Rectal Cancer Neg Hx     Stomach Cancer Neg Hx      Social History     Tobacco Use    Smoking status: Former     Packs/day: 0.50     Years: 10.00     Pack years: 5.00     Types: Cigarettes     Quit date: 65     Years since quittin.8    Smokeless tobacco: Never   Substance Use Topics    Alcohol use: Not Currently          Review of Systems:    Review of Systems   Constitutional:  Negative for activity change, chills, diaphoresis, fatigue and fever. HENT:  Negative for congestion and sore throat. Respiratory:  Negative for cough, chest tightness, shortness of breath and wheezing. Cardiovascular:  Negative for chest pain, palpitations and leg swelling. Neurological:  Negative for dizziness, syncope, light-headedness and headaches. Psychiatric/Behavioral:  Negative for confusion. The patient is not nervous/anxious. Objective:    /64   Pulse 66   Ht 5' 6\" (1.676 m)   Wt 173 lb 9.6 oz (78.7 kg)   SpO2 98%   BMI 28.02 kg/m²     GENERAL - well developed and well nourished, conversant  HEENT -  PERRLA, Hearing appears normal, gentleman lids are normal.  External inspection of ears and nose appear normal.  NECK - no thyromegaly, no JVD, trachea is in the midline  CARDIOVASCULAR - PMI is in the mid line clavicular position, Normal S1 and S2 with no systolic murmur. No S3 or S4    PULMONARY - no respiratory distress. No wheezes or rales. Lungs are clear to ausculation, normal respiratory effort. ABDOMEN  - soft, non tender, no rebound  MUSCULOSKELETAL  - range of motion of the upper and lower extermites appears normal and equal and is without pain   EXTREMITIES - no significant edema   NEUROLOGIC - gait and station are normal  SKIN - turgor is normal, can warm and dry.   PSYCHIATRIC - normal mood and affect, alert and orientated x 3,      ASSESSMENT:    ALL THE CARDIOLOGY PROBLEMS ARE LISTED ABOVE; HOWEVER, THE FOLLOWING SPECIFIC CARDIAC PROBLEMS / CONDITIONS WERE ADDRESSED AND TREATED DURING THE OFFICE VISIT TODAY:                                                                                            MEDICAL DECISION MAKING Cardiac Specific Problem / Diagnosis  Discussion and Data Reviewed Diagnostic Procedures Ordered Management Options Selected           1. CAD  Stable   Review and summation of old records:    No chest pain. Patient is on Lipitor, aspirin No Continue current medications:     Yes           2. Hypertension  Well Controlled   Blood pressure in the office today 116/64. O2 sat 98%. Patient is on losartan 12.5 mg daily. No Continue current medications:    Yes           3. Dyslipidemia Stable Lipid profile early September 2022 shows LDL 86.  Given patient's complaints of some leg cramping from the Lipitor 40 mg daily. He would like to trial 20 mg daily. We will have patient trial that as well as lifestyle modifications. Have him return to clinic in 3 months with fasting lipid profile. Patient verbalized understanding and agreed with plan. Yes Continue current medications: Yes                     Orders Placed This Encounter   Procedures    Lipid, Fasting     Standing Status:   Future     Standing Expiration Date:   10/13/2023       Orders Placed This Encounter   Medications    atorvastatin (LIPITOR) 40 MG tablet     Sig: Take 0.5 tablets by mouth daily     Dispense:  30 tablet     Refill:  3         Discussed with patient. Return in about 3 months (around 1/13/2023) for Dr Melanie Hyman or me . I greatly appreciate the opportunity to meet Quincy Fernández. and your confidence in allowing me to participate in his cardiovascular care. CARITO Garcia NP  10/13/2022 9:23 AM CDT                    This dictation was generated by voice recognition computer software. Although all attempts are made to edit dictation for accuracy, there may be errors in the transcription that are not intended.

## 2024-08-01 DIAGNOSIS — E11.649 TYPE 2 DIABETES MELLITUS WITH HYPOGLYCEMIA WITHOUT COMA, WITHOUT LONG-TERM CURRENT USE OF INSULIN: ICD-10-CM

## 2024-08-02 NOTE — TELEPHONE ENCOUNTER
"Called patient and recording came on \"customer is not available. Try your call again later.\" Unable to leave vm  "

## 2024-08-05 NOTE — TELEPHONE ENCOUNTER
"Called pt and received recording \"the customer you are calling is not available. Try your call again later.\"  "

## 2024-08-06 NOTE — TELEPHONE ENCOUNTER
"Called pt and received recording \"the customer you are calling is not available. Try your call again later.\"     This was the 3rd time I have called.   "

## (undated) DEVICE — DRAPE,SPL,SHOULD,PCH,STERILE: Brand: MEDLINE

## (undated) DEVICE — DRAPE EENT SPLIT

## (undated) DEVICE — THREE QUARTER SHEET: Brand: CONVERTORS

## (undated) DEVICE — 3M™ COBAN™ NL STERILE NON-LATEX SELF-ADHERENT WRAP, 2082S, 2 IN X 5 YD (5 CM X 4,5 M), 36 ROLLS/CASE: Brand: 3M™ COBAN™

## (undated) DEVICE — ENCORE® LATEX MICRO SIZE 6.5, STERILE LATEX POWDER-FREE SURGICAL GLOVE: Brand: ENCORE

## (undated) DEVICE — U-DRAPE: Brand: CONVERTORS

## (undated) DEVICE — AMBU AURAONCE U SIZE 4: Brand: AURAONCE

## (undated) DEVICE — ENDO KIT,LOURDES HOSPITAL: Brand: MEDLINE INDUSTRIES, INC.

## (undated) DEVICE — STERLING XTRASHARP SHAVER GREAT WHITE SHAVER BLADE, 4.2 MM: Brand: STERLING XTRASHARP SHAVER GREAT WHITE

## (undated) DEVICE — AMBIENT SUPER TURBOVAC 90: Brand: COBLATION

## (undated) DEVICE — MAJOR BSIN SETUP PK

## (undated) DEVICE — STOCKINETTE: Brand: CONVERTORS

## (undated) DEVICE — INTENDED FOR TISSUE SEPARATION, AND OTHER PROCEDURES THAT REQUIRE A SHARP SURGICAL BLADE TO PUNCTURE OR CUT.: Brand: BARD-PARKER ® CARBON RIB-BACK BLADES

## (undated) DEVICE — Device: Brand: AQUALOC®

## (undated) DEVICE — FORCEPS BX 240CM 2.4MM L NDL RAD JAW 4 M00513334

## (undated) DEVICE — DISCONTINUED USE 416956 GLOVE 8.5 LTX ST TRIFLEX POWDER LK CF

## (undated) DEVICE — QUATTRO SUTURE PASSER NEEDLE

## (undated) DEVICE — AIRWAY CIRCUIT: Brand: DEROYAL

## (undated) DEVICE — CHLORAPREP 26ML ORANGE

## (undated) DEVICE — SNARE POLYP SM AD W13MMXL240CM SHTH DIA2.4MM HEX STIFF

## (undated) DEVICE — 10K ARTHROSCOPY INFLOW/OUTFLOW TUBE SET: Brand: 10K

## (undated) DEVICE — MAYO STAND COVER: Brand: CONVERTORS

## (undated) DEVICE — CURITY NON-ADHERENT STRIPS: Brand: CURITY

## (undated) DEVICE — Device

## (undated) DEVICE — ABDOMINAL PAD: Brand: DERMACEA

## (undated) DEVICE — MEDI-VAC NON-CONDUCTIVE SUCTION TUBING: Brand: CARDINAL HEALTH

## (undated) DEVICE — SUTURE ABSORBABLE BRAIDED 2-0 CT-1 27 IN UD VICRYL J259H

## (undated) DEVICE — STERLING OVAL BUR, 4.0 MM: Brand: STERLING